# Patient Record
Sex: MALE | Race: WHITE | Employment: OTHER | URBAN - NONMETROPOLITAN AREA
[De-identification: names, ages, dates, MRNs, and addresses within clinical notes are randomized per-mention and may not be internally consistent; named-entity substitution may affect disease eponyms.]

---

## 2017-01-17 ENCOUNTER — TELEPHONE (OUTPATIENT)
Dept: FAMILY MEDICINE CLINIC | Age: 57
End: 2017-01-17

## 2017-01-17 DIAGNOSIS — I10 ESSENTIAL HYPERTENSION: Primary | Chronic | ICD-10-CM

## 2017-01-20 ENCOUNTER — TELEPHONE (OUTPATIENT)
Dept: FAMILY MEDICINE CLINIC | Age: 57
End: 2017-01-20

## 2017-01-20 ENCOUNTER — OFFICE VISIT (OUTPATIENT)
Dept: FAMILY MEDICINE CLINIC | Age: 57
End: 2017-01-20

## 2017-01-20 VITALS
HEART RATE: 95 BPM | HEIGHT: 66 IN | TEMPERATURE: 98 F | BODY MASS INDEX: 25.97 KG/M2 | WEIGHT: 161.6 LBS | SYSTOLIC BLOOD PRESSURE: 132 MMHG | DIASTOLIC BLOOD PRESSURE: 80 MMHG | RESPIRATION RATE: 14 BRPM

## 2017-01-20 DIAGNOSIS — R29.898 HAND WEAKNESS: Primary | ICD-10-CM

## 2017-01-20 DIAGNOSIS — B18.2 CHRONIC HEPATITIS C WITHOUT HEPATIC COMA (HCC): Chronic | ICD-10-CM

## 2017-01-20 DIAGNOSIS — M25.561 CHRONIC PAIN OF RIGHT KNEE: ICD-10-CM

## 2017-01-20 DIAGNOSIS — R10.33 UMBILICAL PAIN: ICD-10-CM

## 2017-01-20 DIAGNOSIS — G89.29 CHRONIC PAIN OF RIGHT KNEE: ICD-10-CM

## 2017-01-20 DIAGNOSIS — F17.210 CIGARETTE NICOTINE DEPENDENCE WITHOUT COMPLICATION: Chronic | ICD-10-CM

## 2017-01-20 PROCEDURE — G8427 DOCREV CUR MEDS BY ELIG CLIN: HCPCS | Performed by: FAMILY MEDICINE

## 2017-01-20 PROCEDURE — 3017F COLORECTAL CA SCREEN DOC REV: CPT | Performed by: FAMILY MEDICINE

## 2017-01-20 PROCEDURE — G8484 FLU IMMUNIZE NO ADMIN: HCPCS | Performed by: FAMILY MEDICINE

## 2017-01-20 PROCEDURE — G8419 CALC BMI OUT NRM PARAM NOF/U: HCPCS | Performed by: FAMILY MEDICINE

## 2017-01-20 PROCEDURE — 99214 OFFICE O/P EST MOD 30 MIN: CPT | Performed by: FAMILY MEDICINE

## 2017-01-20 PROCEDURE — 4004F PT TOBACCO SCREEN RCVD TLK: CPT | Performed by: FAMILY MEDICINE

## 2017-01-23 ENCOUNTER — TELEPHONE (OUTPATIENT)
Dept: FAMILY MEDICINE CLINIC | Age: 57
End: 2017-01-23

## 2017-01-23 DIAGNOSIS — M17.11 OSTEOARTHRITIS OF RIGHT KNEE, UNSPECIFIED OSTEOARTHRITIS TYPE: Primary | ICD-10-CM

## 2017-01-23 DIAGNOSIS — M25.561 CHRONIC PAIN OF RIGHT KNEE: ICD-10-CM

## 2017-01-23 DIAGNOSIS — G89.29 CHRONIC PAIN OF RIGHT KNEE: ICD-10-CM

## 2017-01-23 DIAGNOSIS — M51.35 DDD (DEGENERATIVE DISC DISEASE), THORACOLUMBAR: Chronic | ICD-10-CM

## 2017-01-23 DIAGNOSIS — M96.1 FAILED BACK SYNDROME: Chronic | ICD-10-CM

## 2017-01-23 RX ORDER — OXYCODONE HYDROCHLORIDE 20 MG/1
20 TABLET ORAL EVERY 4 HOURS PRN
Qty: 180 TABLET | Refills: 0 | Status: SHIPPED | OUTPATIENT
Start: 2017-01-23 | End: 2017-02-21 | Stop reason: SDUPTHER

## 2017-01-24 ENCOUNTER — TELEPHONE (OUTPATIENT)
Dept: FAMILY MEDICINE CLINIC | Age: 57
End: 2017-01-24

## 2017-01-27 DIAGNOSIS — K21.9 GASTROESOPHAGEAL REFLUX DISEASE, ESOPHAGITIS PRESENCE NOT SPECIFIED: Chronic | ICD-10-CM

## 2017-01-27 RX ORDER — OMEPRAZOLE 20 MG/1
CAPSULE, DELAYED RELEASE ORAL
Qty: 90 CAPSULE | Refills: 3 | Status: SHIPPED | OUTPATIENT
Start: 2017-01-27 | End: 2018-04-04 | Stop reason: SDUPTHER

## 2017-01-30 ENCOUNTER — TELEPHONE (OUTPATIENT)
Dept: FAMILY MEDICINE CLINIC | Age: 57
End: 2017-01-30

## 2017-02-01 RX ORDER — ALBUTEROL SULFATE 2.5 MG/3ML
SOLUTION RESPIRATORY (INHALATION)
Qty: 360 ML | Refills: 3 | Status: SHIPPED | OUTPATIENT
Start: 2017-02-01 | End: 2017-08-16 | Stop reason: SDUPTHER

## 2017-02-06 ENCOUNTER — OFFICE VISIT (OUTPATIENT)
Dept: FAMILY MEDICINE CLINIC | Age: 57
End: 2017-02-06

## 2017-02-06 VITALS
HEART RATE: 65 BPM | TEMPERATURE: 98.8 F | SYSTOLIC BLOOD PRESSURE: 128 MMHG | WEIGHT: 157 LBS | RESPIRATION RATE: 16 BRPM | HEIGHT: 66 IN | BODY MASS INDEX: 25.23 KG/M2 | DIASTOLIC BLOOD PRESSURE: 82 MMHG

## 2017-02-06 DIAGNOSIS — F17.210 CIGARETTE NICOTINE DEPENDENCE WITHOUT COMPLICATION: ICD-10-CM

## 2017-02-06 DIAGNOSIS — R10.33 UMBILICAL PAIN: ICD-10-CM

## 2017-02-06 DIAGNOSIS — A08.4 VIRAL GASTROENTERITIS: Primary | ICD-10-CM

## 2017-02-06 DIAGNOSIS — R77.8 ELEVATED TROPONIN LEVEL: ICD-10-CM

## 2017-02-06 DIAGNOSIS — B18.2 CHRONIC HEPATITIS C WITHOUT HEPATIC COMA (HCC): ICD-10-CM

## 2017-02-06 PROCEDURE — 99495 TRANSJ CARE MGMT MOD F2F 14D: CPT | Performed by: FAMILY MEDICINE

## 2017-02-06 RX ORDER — FUROSEMIDE 20 MG/1
20 TABLET ORAL DAILY PRN
COMMUNITY
End: 2017-07-25 | Stop reason: SDUPTHER

## 2017-02-06 RX ORDER — NICOTINE 21 MG/24HR
1 PATCH, TRANSDERMAL 24 HOURS TRANSDERMAL EVERY 24 HOURS
Qty: 42 PATCH | Refills: 0 | Status: SHIPPED | OUTPATIENT
Start: 2017-02-06 | End: 2017-03-20

## 2017-02-07 DIAGNOSIS — G89.4 CHRONIC PAIN SYNDROME: Chronic | ICD-10-CM

## 2017-02-07 DIAGNOSIS — M51.35 DDD (DEGENERATIVE DISC DISEASE), THORACOLUMBAR: Chronic | ICD-10-CM

## 2017-02-07 RX ORDER — PSEUDOEPHEDRINE HCL 30 MG
100 TABLET ORAL 2 TIMES DAILY PRN
Qty: 180 CAPSULE | Refills: 3 | Status: SHIPPED | OUTPATIENT
Start: 2017-02-07 | End: 2017-02-13 | Stop reason: ALTCHOICE

## 2017-02-08 ENCOUNTER — TELEPHONE (OUTPATIENT)
Dept: FAMILY MEDICINE CLINIC | Age: 57
End: 2017-02-08

## 2017-02-13 ENCOUNTER — TELEPHONE (OUTPATIENT)
Dept: FAMILY MEDICINE CLINIC | Age: 57
End: 2017-02-13

## 2017-02-13 DIAGNOSIS — K59.03 DRUG-INDUCED CONSTIPATION: Primary | ICD-10-CM

## 2017-02-13 RX ORDER — POLYETHYLENE GLYCOL 3350 17 G/17G
17 POWDER, FOR SOLUTION ORAL DAILY PRN
Qty: 510 G | Refills: 5 | Status: SHIPPED | OUTPATIENT
Start: 2017-02-13 | End: 2017-12-14 | Stop reason: SDUPTHER

## 2017-02-21 ENCOUNTER — OFFICE VISIT (OUTPATIENT)
Dept: ONCOLOGY | Age: 57
End: 2017-02-21

## 2017-02-21 VITALS
HEART RATE: 86 BPM | OXYGEN SATURATION: 96 % | SYSTOLIC BLOOD PRESSURE: 121 MMHG | WEIGHT: 151.2 LBS | DIASTOLIC BLOOD PRESSURE: 77 MMHG | BODY MASS INDEX: 24.3 KG/M2 | RESPIRATION RATE: 18 BRPM | TEMPERATURE: 97.4 F | HEIGHT: 66 IN

## 2017-02-21 DIAGNOSIS — M96.1 FAILED BACK SYNDROME: Chronic | ICD-10-CM

## 2017-02-21 DIAGNOSIS — C83.33 DIFFUSE LARGE B-CELL LYMPHOMA OF INTRA-ABDOMINAL LYMPH NODES (HCC): Primary | ICD-10-CM

## 2017-02-21 DIAGNOSIS — M51.35 DDD (DEGENERATIVE DISC DISEASE), THORACOLUMBAR: Chronic | ICD-10-CM

## 2017-02-21 PROCEDURE — G8484 FLU IMMUNIZE NO ADMIN: HCPCS | Performed by: INTERNAL MEDICINE

## 2017-02-21 PROCEDURE — 4004F PT TOBACCO SCREEN RCVD TLK: CPT | Performed by: INTERNAL MEDICINE

## 2017-02-21 PROCEDURE — 3017F COLORECTAL CA SCREEN DOC REV: CPT | Performed by: INTERNAL MEDICINE

## 2017-02-21 PROCEDURE — G8427 DOCREV CUR MEDS BY ELIG CLIN: HCPCS | Performed by: INTERNAL MEDICINE

## 2017-02-21 PROCEDURE — 99213 OFFICE O/P EST LOW 20 MIN: CPT | Performed by: INTERNAL MEDICINE

## 2017-02-21 PROCEDURE — G8420 CALC BMI NORM PARAMETERS: HCPCS | Performed by: INTERNAL MEDICINE

## 2017-02-21 RX ORDER — OXYCODONE HYDROCHLORIDE 20 MG/1
20 TABLET ORAL EVERY 4 HOURS PRN
Qty: 180 TABLET | Refills: 0 | Status: SHIPPED | OUTPATIENT
Start: 2017-02-21 | End: 2017-03-20 | Stop reason: SDUPTHER

## 2017-02-22 ENCOUNTER — TELEPHONE (OUTPATIENT)
Dept: FAMILY MEDICINE CLINIC | Age: 57
End: 2017-02-22

## 2017-02-23 ENCOUNTER — TELEPHONE (OUTPATIENT)
Dept: FAMILY MEDICINE CLINIC | Age: 57
End: 2017-02-23

## 2017-02-23 DIAGNOSIS — J30.89 PERENNIAL ALLERGIC RHINITIS, UNSPECIFIED ALLERGIC RHINITIS TRIGGER: Primary | ICD-10-CM

## 2017-02-23 RX ORDER — LORATADINE 10 MG/1
10 TABLET ORAL DAILY
Qty: 90 TABLET | Refills: 3 | Status: SHIPPED | OUTPATIENT
Start: 2017-02-23 | End: 2017-04-20

## 2017-02-27 ENCOUNTER — TELEPHONE (OUTPATIENT)
Dept: FAMILY MEDICINE CLINIC | Age: 57
End: 2017-02-27

## 2017-02-27 DIAGNOSIS — M25.561 RIGHT KNEE PAIN, UNSPECIFIED CHRONICITY: Primary | ICD-10-CM

## 2017-03-01 ENCOUNTER — TELEPHONE (OUTPATIENT)
Dept: FAMILY MEDICINE CLINIC | Age: 57
End: 2017-03-01

## 2017-03-06 ENCOUNTER — TELEPHONE (OUTPATIENT)
Dept: FAMILY MEDICINE CLINIC | Age: 57
End: 2017-03-06

## 2017-03-07 ENCOUNTER — OFFICE VISIT (OUTPATIENT)
Dept: FAMILY MEDICINE CLINIC | Age: 57
End: 2017-03-07

## 2017-03-07 VITALS
WEIGHT: 152.4 LBS | HEIGHT: 64 IN | SYSTOLIC BLOOD PRESSURE: 114 MMHG | HEART RATE: 80 BPM | TEMPERATURE: 98.3 F | BODY MASS INDEX: 26.02 KG/M2 | DIASTOLIC BLOOD PRESSURE: 80 MMHG | RESPIRATION RATE: 12 BRPM

## 2017-03-07 DIAGNOSIS — M25.561 CHRONIC PAIN OF RIGHT KNEE: ICD-10-CM

## 2017-03-07 DIAGNOSIS — R10.33 UMBILICAL PAIN: ICD-10-CM

## 2017-03-07 DIAGNOSIS — M17.11 OSTEOARTHRITIS OF RIGHT KNEE, UNSPECIFIED OSTEOARTHRITIS TYPE: Primary | ICD-10-CM

## 2017-03-07 DIAGNOSIS — F17.210 CIGARETTE NICOTINE DEPENDENCE WITHOUT COMPLICATION: Chronic | ICD-10-CM

## 2017-03-07 DIAGNOSIS — G89.29 CHRONIC PAIN OF RIGHT KNEE: ICD-10-CM

## 2017-03-07 DIAGNOSIS — S89.81XS: ICD-10-CM

## 2017-03-07 PROCEDURE — G8427 DOCREV CUR MEDS BY ELIG CLIN: HCPCS | Performed by: FAMILY MEDICINE

## 2017-03-07 PROCEDURE — G8484 FLU IMMUNIZE NO ADMIN: HCPCS | Performed by: FAMILY MEDICINE

## 2017-03-07 PROCEDURE — 99214 OFFICE O/P EST MOD 30 MIN: CPT | Performed by: FAMILY MEDICINE

## 2017-03-07 PROCEDURE — 4004F PT TOBACCO SCREEN RCVD TLK: CPT | Performed by: FAMILY MEDICINE

## 2017-03-07 PROCEDURE — 3017F COLORECTAL CA SCREEN DOC REV: CPT | Performed by: FAMILY MEDICINE

## 2017-03-07 PROCEDURE — G8419 CALC BMI OUT NRM PARAM NOF/U: HCPCS | Performed by: FAMILY MEDICINE

## 2017-03-08 ENCOUNTER — TELEPHONE (OUTPATIENT)
Dept: FAMILY MEDICINE CLINIC | Age: 57
End: 2017-03-08

## 2017-03-13 RX ORDER — BUSPIRONE HYDROCHLORIDE 15 MG/1
TABLET ORAL
Qty: 360 TABLET | Refills: 3 | Status: SHIPPED | OUTPATIENT
Start: 2017-03-13 | End: 2018-04-18 | Stop reason: SDUPTHER

## 2017-03-20 ENCOUNTER — OFFICE VISIT (OUTPATIENT)
Dept: FAMILY MEDICINE CLINIC | Age: 57
End: 2017-03-20

## 2017-03-20 ENCOUNTER — TELEPHONE (OUTPATIENT)
Dept: FAMILY MEDICINE CLINIC | Age: 57
End: 2017-03-20

## 2017-03-20 VITALS
TEMPERATURE: 98.6 F | DIASTOLIC BLOOD PRESSURE: 74 MMHG | WEIGHT: 157.4 LBS | HEART RATE: 80 BPM | BODY MASS INDEX: 26.87 KG/M2 | RESPIRATION RATE: 14 BRPM | SYSTOLIC BLOOD PRESSURE: 138 MMHG | HEIGHT: 64 IN

## 2017-03-20 DIAGNOSIS — B18.2 CHRONIC HEPATITIS C WITHOUT HEPATIC COMA (HCC): ICD-10-CM

## 2017-03-20 DIAGNOSIS — R77.8 ELEVATED TROPONIN LEVEL: ICD-10-CM

## 2017-03-20 DIAGNOSIS — M51.35 DDD (DEGENERATIVE DISC DISEASE), THORACOLUMBAR: Primary | Chronic | ICD-10-CM

## 2017-03-20 DIAGNOSIS — M25.522 LEFT ELBOW PAIN: ICD-10-CM

## 2017-03-20 DIAGNOSIS — I71.20 THORACIC AORTIC ANEURYSM WITHOUT RUPTURE: Chronic | ICD-10-CM

## 2017-03-20 DIAGNOSIS — M81.0 OSTEOPOROSIS: ICD-10-CM

## 2017-03-20 DIAGNOSIS — F17.210 CIGARETTE NICOTINE DEPENDENCE WITHOUT COMPLICATION: Chronic | ICD-10-CM

## 2017-03-20 DIAGNOSIS — M96.1 FAILED BACK SYNDROME: Chronic | ICD-10-CM

## 2017-03-20 DIAGNOSIS — R29.898 HAND WEAKNESS: ICD-10-CM

## 2017-03-20 PROCEDURE — 3017F COLORECTAL CA SCREEN DOC REV: CPT | Performed by: FAMILY MEDICINE

## 2017-03-20 PROCEDURE — 99214 OFFICE O/P EST MOD 30 MIN: CPT | Performed by: FAMILY MEDICINE

## 2017-03-20 PROCEDURE — G8427 DOCREV CUR MEDS BY ELIG CLIN: HCPCS | Performed by: FAMILY MEDICINE

## 2017-03-20 PROCEDURE — 4005F PHARM THX FOR OP RXD: CPT | Performed by: FAMILY MEDICINE

## 2017-03-20 PROCEDURE — G8484 FLU IMMUNIZE NO ADMIN: HCPCS | Performed by: FAMILY MEDICINE

## 2017-03-20 PROCEDURE — 4004F PT TOBACCO SCREEN RCVD TLK: CPT | Performed by: FAMILY MEDICINE

## 2017-03-20 PROCEDURE — G8419 CALC BMI OUT NRM PARAM NOF/U: HCPCS | Performed by: FAMILY MEDICINE

## 2017-03-20 RX ORDER — OXYCODONE HYDROCHLORIDE 20 MG/1
20 TABLET ORAL EVERY 4 HOURS PRN
Qty: 180 TABLET | Refills: 0 | Status: SHIPPED | OUTPATIENT
Start: 2017-03-20 | End: 2017-04-17 | Stop reason: SDUPTHER

## 2017-03-20 RX ORDER — ALBUTEROL SULFATE 90 UG/1
AEROSOL, METERED RESPIRATORY (INHALATION)
Qty: 3 INHALER | Refills: 5 | Status: SHIPPED | OUTPATIENT
Start: 2017-03-20 | End: 2018-05-06 | Stop reason: SDUPTHER

## 2017-04-03 ENCOUNTER — TELEPHONE (OUTPATIENT)
Dept: FAMILY MEDICINE CLINIC | Age: 57
End: 2017-04-03

## 2017-04-03 DIAGNOSIS — M85.80 OSTEOPENIA: Primary | Chronic | ICD-10-CM

## 2017-04-10 DIAGNOSIS — A08.4 VIRAL GASTROENTERITIS: ICD-10-CM

## 2017-04-10 DIAGNOSIS — R11.0 NAUSEA: ICD-10-CM

## 2017-04-10 RX ORDER — ONDANSETRON 4 MG/1
4 TABLET, FILM COATED ORAL DAILY PRN
Qty: 60 TABLET | Refills: 1 | Status: SHIPPED | OUTPATIENT
Start: 2017-04-10 | End: 2017-06-20 | Stop reason: SDUPTHER

## 2017-04-17 ENCOUNTER — TELEPHONE (OUTPATIENT)
Dept: FAMILY MEDICINE CLINIC | Age: 57
End: 2017-04-17

## 2017-04-17 DIAGNOSIS — M51.35 DDD (DEGENERATIVE DISC DISEASE), THORACOLUMBAR: Primary | Chronic | ICD-10-CM

## 2017-04-17 DIAGNOSIS — M96.1 FAILED BACK SYNDROME: Chronic | ICD-10-CM

## 2017-04-17 RX ORDER — OXYCODONE HYDROCHLORIDE 20 MG/1
20 TABLET ORAL EVERY 4 HOURS PRN
Qty: 180 TABLET | Refills: 0 | Status: SHIPPED | OUTPATIENT
Start: 2017-04-17 | End: 2017-05-08 | Stop reason: DRUGHIGH

## 2017-04-20 ENCOUNTER — OFFICE VISIT (OUTPATIENT)
Dept: FAMILY MEDICINE CLINIC | Age: 57
End: 2017-04-20

## 2017-04-20 VITALS
DIASTOLIC BLOOD PRESSURE: 76 MMHG | RESPIRATION RATE: 16 BRPM | HEART RATE: 84 BPM | WEIGHT: 151 LBS | SYSTOLIC BLOOD PRESSURE: 118 MMHG | TEMPERATURE: 98.4 F | BODY MASS INDEX: 25.92 KG/M2

## 2017-04-20 DIAGNOSIS — S46.212A RUPTURE OF DISTAL BICEPS TENDON, LEFT, INITIAL ENCOUNTER: Primary | ICD-10-CM

## 2017-04-20 DIAGNOSIS — M85.80 OSTEOPENIA: Chronic | ICD-10-CM

## 2017-04-20 DIAGNOSIS — F17.210 CIGARETTE NICOTINE DEPENDENCE WITHOUT COMPLICATION: Chronic | ICD-10-CM

## 2017-04-20 DIAGNOSIS — I10 ESSENTIAL HYPERTENSION: Chronic | ICD-10-CM

## 2017-04-20 PROCEDURE — 3017F COLORECTAL CA SCREEN DOC REV: CPT | Performed by: FAMILY MEDICINE

## 2017-04-20 PROCEDURE — G8427 DOCREV CUR MEDS BY ELIG CLIN: HCPCS | Performed by: FAMILY MEDICINE

## 2017-04-20 PROCEDURE — 4004F PT TOBACCO SCREEN RCVD TLK: CPT | Performed by: FAMILY MEDICINE

## 2017-04-20 PROCEDURE — G8419 CALC BMI OUT NRM PARAM NOF/U: HCPCS | Performed by: FAMILY MEDICINE

## 2017-04-20 PROCEDURE — 99214 OFFICE O/P EST MOD 30 MIN: CPT | Performed by: FAMILY MEDICINE

## 2017-04-20 RX ORDER — NAPROXEN 500 MG/1
500 TABLET ORAL 2 TIMES DAILY
Refills: 3 | COMMUNITY
Start: 2017-04-06 | End: 2017-07-10 | Stop reason: SDUPTHER

## 2017-04-20 RX ORDER — POLYETHYLENE GLYCOL 3350 17 G/17G
POWDER, FOR SOLUTION ORAL DAILY
Refills: 4 | COMMUNITY
Start: 2017-03-17 | End: 2017-12-14 | Stop reason: SDUPTHER

## 2017-04-24 RX ORDER — BUTALBITAL, ACETAMINOPHEN AND CAFFEINE 50; 325; 40 MG/1; MG/1; MG/1
TABLET ORAL
Qty: 60 TABLET | Refills: 1 | Status: SHIPPED | OUTPATIENT
Start: 2017-04-24 | End: 2017-12-14 | Stop reason: SDUPTHER

## 2017-05-01 ENCOUNTER — OFFICE VISIT (OUTPATIENT)
Dept: FAMILY MEDICINE CLINIC | Age: 57
End: 2017-05-01

## 2017-05-01 VITALS
WEIGHT: 149 LBS | RESPIRATION RATE: 20 BRPM | TEMPERATURE: 98.3 F | BODY MASS INDEX: 25.58 KG/M2 | HEART RATE: 76 BPM | SYSTOLIC BLOOD PRESSURE: 124 MMHG | DIASTOLIC BLOOD PRESSURE: 78 MMHG

## 2017-05-01 DIAGNOSIS — J01.90 ACUTE RHINOSINUSITIS: Primary | ICD-10-CM

## 2017-05-01 DIAGNOSIS — F17.210 CIGARETTE NICOTINE DEPENDENCE WITHOUT COMPLICATION: Chronic | ICD-10-CM

## 2017-05-01 DIAGNOSIS — S46.212D RUPTURE OF DISTAL BICEPS TENDON, LEFT, SUBSEQUENT ENCOUNTER: ICD-10-CM

## 2017-05-01 PROCEDURE — 99214 OFFICE O/P EST MOD 30 MIN: CPT | Performed by: FAMILY MEDICINE

## 2017-05-01 PROCEDURE — 4004F PT TOBACCO SCREEN RCVD TLK: CPT | Performed by: FAMILY MEDICINE

## 2017-05-01 PROCEDURE — G8427 DOCREV CUR MEDS BY ELIG CLIN: HCPCS | Performed by: FAMILY MEDICINE

## 2017-05-01 PROCEDURE — G8419 CALC BMI OUT NRM PARAM NOF/U: HCPCS | Performed by: FAMILY MEDICINE

## 2017-05-01 PROCEDURE — 3017F COLORECTAL CA SCREEN DOC REV: CPT | Performed by: FAMILY MEDICINE

## 2017-05-01 RX ORDER — DOXYCYCLINE HYCLATE 100 MG/1
100 CAPSULE ORAL 2 TIMES DAILY
Qty: 20 CAPSULE | Refills: 0 | Status: SHIPPED | OUTPATIENT
Start: 2017-05-01 | End: 2017-05-11

## 2017-05-02 ENCOUNTER — TELEPHONE (OUTPATIENT)
Dept: FAMILY MEDICINE CLINIC | Age: 57
End: 2017-05-02

## 2017-05-02 DIAGNOSIS — R77.8 ELEVATED TROPONIN: ICD-10-CM

## 2017-05-02 DIAGNOSIS — Z01.818 PREOP EXAMINATION: Primary | ICD-10-CM

## 2017-05-02 DIAGNOSIS — M96.1 FAILED BACK SYNDROME: Chronic | ICD-10-CM

## 2017-05-02 DIAGNOSIS — Z87.39 HISTORY OF SPINAL STENOSIS: Chronic | ICD-10-CM

## 2017-05-02 DIAGNOSIS — M19.90 ARTHRITIS: Chronic | ICD-10-CM

## 2017-05-02 RX ORDER — NAPROXEN 500 MG/1
TABLET ORAL
Qty: 60 TABLET | Refills: 0 | OUTPATIENT
Start: 2017-05-02

## 2017-05-04 ENCOUNTER — OFFICE VISIT (OUTPATIENT)
Dept: CARDIOLOGY | Age: 57
End: 2017-05-04

## 2017-05-04 VITALS
HEART RATE: 72 BPM | DIASTOLIC BLOOD PRESSURE: 86 MMHG | BODY MASS INDEX: 25.85 KG/M2 | WEIGHT: 151.4 LBS | SYSTOLIC BLOOD PRESSURE: 134 MMHG | HEIGHT: 64 IN

## 2017-05-04 DIAGNOSIS — E78.5 DYSLIPIDEMIA, GOAL LDL BELOW 100: ICD-10-CM

## 2017-05-04 DIAGNOSIS — I10 HYPERTENSION GOAL BP (BLOOD PRESSURE) < 130/80: ICD-10-CM

## 2017-05-04 DIAGNOSIS — F17.200 CURRENT SMOKER: ICD-10-CM

## 2017-05-04 DIAGNOSIS — Z01.818 PRE-OPERATIVE CLEARANCE: Primary | ICD-10-CM

## 2017-05-04 DIAGNOSIS — S46.212A BICEPS MUSCLE TEAR, LEFT, INITIAL ENCOUNTER: ICD-10-CM

## 2017-05-04 DIAGNOSIS — R94.31 ABNORMAL EKG: ICD-10-CM

## 2017-05-04 PROCEDURE — 99406 BEHAV CHNG SMOKING 3-10 MIN: CPT | Performed by: INTERNAL MEDICINE

## 2017-05-04 PROCEDURE — G8419 CALC BMI OUT NRM PARAM NOF/U: HCPCS | Performed by: INTERNAL MEDICINE

## 2017-05-04 PROCEDURE — 99214 OFFICE O/P EST MOD 30 MIN: CPT | Performed by: INTERNAL MEDICINE

## 2017-05-04 PROCEDURE — 4004F PT TOBACCO SCREEN RCVD TLK: CPT | Performed by: INTERNAL MEDICINE

## 2017-05-04 PROCEDURE — G8427 DOCREV CUR MEDS BY ELIG CLIN: HCPCS | Performed by: INTERNAL MEDICINE

## 2017-05-04 PROCEDURE — 3017F COLORECTAL CA SCREEN DOC REV: CPT | Performed by: INTERNAL MEDICINE

## 2017-05-04 RX ORDER — ATORVASTATIN CALCIUM 10 MG/1
TABLET, FILM COATED ORAL
Qty: 90 TABLET | Refills: 3 | Status: SHIPPED | OUTPATIENT
Start: 2017-05-04

## 2017-05-04 RX ORDER — ATORVASTATIN CALCIUM 10 MG/1
10 TABLET, FILM COATED ORAL DAILY
Qty: 30 TABLET | Refills: 3 | Status: SHIPPED | OUTPATIENT
Start: 2017-05-04 | End: 2017-05-04 | Stop reason: SDUPTHER

## 2017-05-08 DIAGNOSIS — S46.212D RUPTURE OF DISTAL BICEPS TENDON, LEFT, SUBSEQUENT ENCOUNTER: ICD-10-CM

## 2017-05-08 DIAGNOSIS — M96.1 FAILED BACK SYNDROME: Chronic | ICD-10-CM

## 2017-05-08 DIAGNOSIS — M51.35 DDD (DEGENERATIVE DISC DISEASE), THORACOLUMBAR: Primary | Chronic | ICD-10-CM

## 2017-05-08 PROBLEM — S46.219A RUPTURE OF DISTAL BICEPS TENDON: Status: ACTIVE | Noted: 2017-05-08

## 2017-05-08 RX ORDER — OXYCODONE HYDROCHLORIDE 30 MG/1
30 TABLET ORAL EVERY 4 HOURS PRN
Qty: 180 TABLET | Refills: 0 | Status: SHIPPED | OUTPATIENT
Start: 2017-05-08 | End: 2017-06-02 | Stop reason: SDUPTHER

## 2017-05-08 RX ORDER — OXYCODONE HYDROCHLORIDE 20 MG/1
20 TABLET ORAL EVERY 4 HOURS PRN
Qty: 180 TABLET | Refills: 0 | Status: CANCELLED | OUTPATIENT
Start: 2017-05-08

## 2017-05-09 ENCOUNTER — TELEPHONE (OUTPATIENT)
Dept: CARDIOLOGY | Age: 57
End: 2017-05-09

## 2017-05-15 ENCOUNTER — OFFICE VISIT (OUTPATIENT)
Dept: FAMILY MEDICINE CLINIC | Age: 57
End: 2017-05-15

## 2017-05-15 VITALS
SYSTOLIC BLOOD PRESSURE: 120 MMHG | RESPIRATION RATE: 18 BRPM | WEIGHT: 147 LBS | DIASTOLIC BLOOD PRESSURE: 62 MMHG | TEMPERATURE: 98.6 F | BODY MASS INDEX: 24.49 KG/M2 | HEART RATE: 84 BPM | HEIGHT: 65 IN

## 2017-05-15 DIAGNOSIS — J01.90 ACUTE RHINOSINUSITIS: ICD-10-CM

## 2017-05-15 DIAGNOSIS — N41.0 ACUTE PROSTATITIS: Primary | ICD-10-CM

## 2017-05-15 DIAGNOSIS — F17.210 CIGARETTE NICOTINE DEPENDENCE WITHOUT COMPLICATION: Chronic | ICD-10-CM

## 2017-05-15 LAB
BACTERIA: ABNORMAL
BILIRUBIN URINE: NEGATIVE
BILIRUBIN, POC: NORMAL
BLOOD URINE, POC: NORMAL
BLOOD, URINE: ABNORMAL
CASTS: ABNORMAL /LPF
CHARACTER, URINE: CLEAR
CLARITY, POC: CLEAR
COLOR, POC: YELLOW
COLOR: YELLOW
EPITHELIAL CELLS, UA: ABNORMAL /HPF
GLUCOSE URINE, POC: NORMAL
GLUCOSE, URINE: NEGATIVE MG/DL
KETONES, POC: NORMAL
KETONES, URINE: NEGATIVE
LEUKOCYTE EST, POC: NORMAL
LEUKOCYTE ESTERASE, URINE: NEGATIVE
NITRITE, POC: NORMAL
NITRITE, URINE: NEGATIVE
PH UA: 6.5
PH, POC: 6.5
PROTEIN UA: NEGATIVE MG/DL
PROTEIN, POC: NORMAL
RBC URINE: ABNORMAL /HPF
SPECIFIC GRAVITY UA: 1.01 (ref 1–1.03)
SPECIFIC GRAVITY, POC: 1.02
UROBILINOGEN, POC: >8
UROBILINOGEN, URINE: 4 EU/DL
WBC UA: ABNORMAL /HPF

## 2017-05-15 PROCEDURE — 99214 OFFICE O/P EST MOD 30 MIN: CPT | Performed by: FAMILY MEDICINE

## 2017-05-15 PROCEDURE — 81001 URINALYSIS AUTO W/SCOPE: CPT | Performed by: FAMILY MEDICINE

## 2017-05-15 PROCEDURE — G8427 DOCREV CUR MEDS BY ELIG CLIN: HCPCS | Performed by: FAMILY MEDICINE

## 2017-05-15 PROCEDURE — 4004F PT TOBACCO SCREEN RCVD TLK: CPT | Performed by: FAMILY MEDICINE

## 2017-05-15 PROCEDURE — 81002 URINALYSIS NONAUTO W/O SCOPE: CPT | Performed by: FAMILY MEDICINE

## 2017-05-15 PROCEDURE — G8420 CALC BMI NORM PARAMETERS: HCPCS | Performed by: FAMILY MEDICINE

## 2017-05-15 PROCEDURE — 3017F COLORECTAL CA SCREEN DOC REV: CPT | Performed by: FAMILY MEDICINE

## 2017-05-15 RX ORDER — LEVOFLOXACIN 500 MG/1
500 TABLET, FILM COATED ORAL DAILY
Qty: 14 TABLET | Refills: 0 | Status: SHIPPED | OUTPATIENT
Start: 2017-05-15 | End: 2017-05-29

## 2017-05-17 LAB — URINE CULTURE, ROUTINE: NORMAL

## 2017-05-18 ENCOUNTER — TELEPHONE (OUTPATIENT)
Dept: FAMILY MEDICINE CLINIC | Age: 57
End: 2017-05-18

## 2017-05-20 DIAGNOSIS — M96.1 FAILED BACK SYNDROME: Chronic | ICD-10-CM

## 2017-05-20 DIAGNOSIS — M51.35 DDD (DEGENERATIVE DISC DISEASE), THORACOLUMBAR: Chronic | ICD-10-CM

## 2017-05-22 RX ORDER — TIZANIDINE 4 MG/1
TABLET ORAL
Qty: 60 TABLET | Refills: 3 | Status: SHIPPED | OUTPATIENT
Start: 2017-05-22 | End: 2017-10-03

## 2017-05-30 ENCOUNTER — TELEPHONE (OUTPATIENT)
Dept: FAMILY MEDICINE CLINIC | Age: 57
End: 2017-05-30

## 2017-05-31 ENCOUNTER — TELEPHONE (OUTPATIENT)
Dept: FAMILY MEDICINE CLINIC | Age: 57
End: 2017-05-31

## 2017-05-31 ENCOUNTER — OFFICE VISIT (OUTPATIENT)
Dept: FAMILY MEDICINE CLINIC | Age: 57
End: 2017-05-31

## 2017-05-31 VITALS
SYSTOLIC BLOOD PRESSURE: 114 MMHG | DIASTOLIC BLOOD PRESSURE: 60 MMHG | HEIGHT: 65 IN | WEIGHT: 148 LBS | HEART RATE: 88 BPM | TEMPERATURE: 98.5 F | RESPIRATION RATE: 12 BRPM | BODY MASS INDEX: 24.66 KG/M2

## 2017-05-31 DIAGNOSIS — J01.90 ACUTE RHINOSINUSITIS: ICD-10-CM

## 2017-05-31 DIAGNOSIS — R73.9 HYPERGLYCEMIA: ICD-10-CM

## 2017-05-31 DIAGNOSIS — R31.29 MICROSCOPIC HEMATURIA: ICD-10-CM

## 2017-05-31 DIAGNOSIS — R20.2 PARESTHESIA OF BOTH FEET: ICD-10-CM

## 2017-05-31 DIAGNOSIS — F17.210 CIGARETTE NICOTINE DEPENDENCE WITHOUT COMPLICATION: Chronic | ICD-10-CM

## 2017-05-31 DIAGNOSIS — N41.0 ACUTE PROSTATITIS: ICD-10-CM

## 2017-05-31 DIAGNOSIS — B07.0 PLANTAR WART: ICD-10-CM

## 2017-05-31 LAB
BACTERIA: ABNORMAL
BILIRUBIN URINE: NEGATIVE
BILIRUBIN, POC: ABNORMAL
BLOOD URINE, POC: ABNORMAL
BLOOD, URINE: ABNORMAL
CASTS: ABNORMAL /LPF
CASTS: ABNORMAL /LPF
CHARACTER, URINE: CLEAR
CLARITY, POC: CLEAR
COLOR, POC: YELLOW
COLOR: ABNORMAL
CRYSTALS: ABNORMAL
EPITHELIAL CELLS, UA: ABNORMAL /HPF
GLUCOSE URINE, POC: NEGATIVE
GLUCOSE, URINE: NEGATIVE MG/DL
KETONES, POC: NEGATIVE
KETONES, URINE: NEGATIVE
LEUKOCYTE EST, POC: NEGATIVE
LEUKOCYTE ESTERASE, URINE: NEGATIVE
MISCELLANEOUS LAB TEST RESULT: ABNORMAL
NITRITE, POC: NEGATIVE
NITRITE, URINE: NEGATIVE
PH UA: 7.5
PH, POC: 7
PROTEIN UA: NEGATIVE MG/DL
PROTEIN, POC: NEGATIVE
RBC URINE: ABNORMAL /HPF
RENAL EPITHELIAL, UA: ABNORMAL
SPECIFIC GRAVITY UA: 1.02 (ref 1–1.03)
SPECIFIC GRAVITY, POC: 1.01
UROBILINOGEN, POC: ABNORMAL
UROBILINOGEN, URINE: 4 EU/DL
WBC UA: ABNORMAL /HPF
YEAST: ABNORMAL

## 2017-05-31 PROCEDURE — 3017F COLORECTAL CA SCREEN DOC REV: CPT | Performed by: FAMILY MEDICINE

## 2017-05-31 PROCEDURE — 81002 URINALYSIS NONAUTO W/O SCOPE: CPT | Performed by: FAMILY MEDICINE

## 2017-05-31 PROCEDURE — 99214 OFFICE O/P EST MOD 30 MIN: CPT | Performed by: FAMILY MEDICINE

## 2017-05-31 PROCEDURE — G8427 DOCREV CUR MEDS BY ELIG CLIN: HCPCS | Performed by: FAMILY MEDICINE

## 2017-05-31 PROCEDURE — G8420 CALC BMI NORM PARAMETERS: HCPCS | Performed by: FAMILY MEDICINE

## 2017-05-31 PROCEDURE — 4004F PT TOBACCO SCREEN RCVD TLK: CPT | Performed by: FAMILY MEDICINE

## 2017-05-31 RX ORDER — GUAIFENESIN 600 MG/1
600 TABLET, EXTENDED RELEASE ORAL 2 TIMES DAILY PRN
Qty: 60 TABLET | Refills: 1 | Status: SHIPPED | OUTPATIENT
Start: 2017-05-31

## 2017-06-02 ENCOUNTER — TELEPHONE (OUTPATIENT)
Dept: FAMILY MEDICINE CLINIC | Age: 57
End: 2017-06-02

## 2017-06-02 DIAGNOSIS — S46.212D RUPTURE OF DISTAL BICEPS TENDON, LEFT, SUBSEQUENT ENCOUNTER: ICD-10-CM

## 2017-06-02 DIAGNOSIS — M96.1 FAILED BACK SYNDROME: Chronic | ICD-10-CM

## 2017-06-02 DIAGNOSIS — M51.35 DDD (DEGENERATIVE DISC DISEASE), THORACOLUMBAR: Primary | Chronic | ICD-10-CM

## 2017-06-02 LAB
ORGANISM: ABNORMAL
URINE CULTURE, ROUTINE: ABNORMAL

## 2017-06-02 RX ORDER — OXYCODONE HYDROCHLORIDE 30 MG/1
30 TABLET ORAL EVERY 4 HOURS PRN
Qty: 180 TABLET | Refills: 0 | Status: SHIPPED | OUTPATIENT
Start: 2017-06-02 | End: 2017-06-02 | Stop reason: SDUPTHER

## 2017-06-02 RX ORDER — OXYCODONE HYDROCHLORIDE 30 MG/1
30 TABLET ORAL EVERY 4 HOURS PRN
Qty: 180 TABLET | Refills: 0 | Status: SHIPPED | OUTPATIENT
Start: 2017-06-02 | End: 2017-06-28 | Stop reason: SDUPTHER

## 2017-06-06 LAB — HBA1C MFR BLD: 5.7 %

## 2017-06-08 ENCOUNTER — TELEPHONE (OUTPATIENT)
Dept: FAMILY MEDICINE CLINIC | Age: 57
End: 2017-06-08

## 2017-06-12 ENCOUNTER — TELEPHONE (OUTPATIENT)
Dept: FAMILY MEDICINE CLINIC | Age: 57
End: 2017-06-12

## 2017-06-12 DIAGNOSIS — J30.0 VASOMOTOR RHINITIS: ICD-10-CM

## 2017-06-12 DIAGNOSIS — J30.0 VASOMOTOR RHINITIS: Primary | ICD-10-CM

## 2017-06-12 RX ORDER — IPRATROPIUM BROMIDE 21 UG/1
2 SPRAY, METERED NASAL 2 TIMES DAILY
Qty: 1 BOTTLE | Refills: 5 | Status: SHIPPED | OUTPATIENT
Start: 2017-06-12 | End: 2017-06-12 | Stop reason: SDUPTHER

## 2017-06-12 RX ORDER — IPRATROPIUM BROMIDE 21 UG/1
SPRAY, METERED NASAL
Qty: 60 ML | Refills: 5 | Status: SHIPPED | OUTPATIENT
Start: 2017-06-12 | End: 2017-08-03 | Stop reason: ALTCHOICE

## 2017-06-20 ENCOUNTER — OFFICE VISIT (OUTPATIENT)
Dept: FAMILY MEDICINE CLINIC | Age: 57
End: 2017-06-20

## 2017-06-20 ENCOUNTER — TELEPHONE (OUTPATIENT)
Dept: FAMILY MEDICINE CLINIC | Age: 57
End: 2017-06-20

## 2017-06-20 VITALS
HEIGHT: 64 IN | TEMPERATURE: 98.2 F | BODY MASS INDEX: 24.86 KG/M2 | DIASTOLIC BLOOD PRESSURE: 64 MMHG | HEART RATE: 64 BPM | RESPIRATION RATE: 12 BRPM | SYSTOLIC BLOOD PRESSURE: 115 MMHG | WEIGHT: 145.6 LBS

## 2017-06-20 DIAGNOSIS — R20.2 PARESTHESIA OF BOTH FEET: ICD-10-CM

## 2017-06-20 DIAGNOSIS — R31.29 MICROSCOPIC HEMATURIA: Primary | ICD-10-CM

## 2017-06-20 DIAGNOSIS — J32.9 CHRONIC SINUSITIS, UNSPECIFIED LOCATION: ICD-10-CM

## 2017-06-20 DIAGNOSIS — F17.210 CIGARETTE NICOTINE DEPENDENCE WITHOUT COMPLICATION: ICD-10-CM

## 2017-06-20 DIAGNOSIS — R11.0 NAUSEA: ICD-10-CM

## 2017-06-20 DIAGNOSIS — H10.13 ALLERGIC CONJUNCTIVITIS, BILATERAL: ICD-10-CM

## 2017-06-20 DIAGNOSIS — R05.3 CHRONIC COUGH: ICD-10-CM

## 2017-06-20 PROBLEM — H10.10 ALLERGIC CONJUNCTIVITIS: Chronic | Status: ACTIVE | Noted: 2017-06-20

## 2017-06-20 PROBLEM — H10.10 ALLERGIC CONJUNCTIVITIS: Status: ACTIVE | Noted: 2017-06-20

## 2017-06-20 LAB
BACTERIA: ABNORMAL
BILIRUBIN URINE: ABNORMAL
BILIRUBIN, POC: NORMAL
BLOOD URINE, POC: NORMAL
BLOOD, URINE: ABNORMAL
CASTS: ABNORMAL /LPF
CASTS: ABNORMAL /LPF
CHARACTER, URINE: CLEAR
CLARITY, POC: CLEAR
COLOR, POC: NORMAL
COLOR: ABNORMAL
CRYSTALS: ABNORMAL
EPITHELIAL CELLS, UA: ABNORMAL /HPF
GLUCOSE URINE, POC: NORMAL
GLUCOSE, URINE: NEGATIVE MG/DL
ICTOTEST: NEGATIVE
KETONES, POC: NORMAL
KETONES, URINE: NEGATIVE
LEUKOCYTE EST, POC: NORMAL
LEUKOCYTE ESTERASE, URINE: NEGATIVE
MISCELLANEOUS LAB TEST RESULT: ABNORMAL
NITRITE, POC: NORMAL
NITRITE, URINE: NEGATIVE
PH UA: 5.5
PH, POC: 5.5
PROTEIN UA: NEGATIVE MG/DL
PROTEIN, POC: NORMAL
RBC URINE: ABNORMAL /HPF
RENAL EPITHELIAL, UA: ABNORMAL
SPECIFIC GRAVITY UA: 1.02 (ref 1–1.03)
SPECIFIC GRAVITY, POC: 1.02
UROBILINOGEN, POC: 2
UROBILINOGEN, URINE: 1 EU/DL
WBC UA: ABNORMAL /HPF
YEAST: ABNORMAL

## 2017-06-20 PROCEDURE — G8427 DOCREV CUR MEDS BY ELIG CLIN: HCPCS | Performed by: FAMILY MEDICINE

## 2017-06-20 PROCEDURE — 3017F COLORECTAL CA SCREEN DOC REV: CPT | Performed by: FAMILY MEDICINE

## 2017-06-20 PROCEDURE — G8420 CALC BMI NORM PARAMETERS: HCPCS | Performed by: FAMILY MEDICINE

## 2017-06-20 PROCEDURE — 99214 OFFICE O/P EST MOD 30 MIN: CPT | Performed by: FAMILY MEDICINE

## 2017-06-20 PROCEDURE — 4004F PT TOBACCO SCREEN RCVD TLK: CPT | Performed by: FAMILY MEDICINE

## 2017-06-20 PROCEDURE — 81002 URINALYSIS NONAUTO W/O SCOPE: CPT | Performed by: FAMILY MEDICINE

## 2017-06-20 RX ORDER — ONDANSETRON 4 MG/1
4 TABLET, FILM COATED ORAL DAILY PRN
Qty: 60 TABLET | Refills: 1 | Status: SHIPPED | OUTPATIENT
Start: 2017-06-20 | End: 2017-09-21 | Stop reason: SDUPTHER

## 2017-06-20 RX ORDER — OLOPATADINE HYDROCHLORIDE 1 MG/ML
1 SOLUTION/ DROPS OPHTHALMIC 2 TIMES DAILY
Qty: 1 BOTTLE | Refills: 3 | Status: SHIPPED | OUTPATIENT
Start: 2017-06-20 | End: 2017-06-21 | Stop reason: CLARIF

## 2017-06-20 RX ORDER — AMOXICILLIN AND CLAVULANATE POTASSIUM 875; 125 MG/1; MG/1
1 TABLET, FILM COATED ORAL 2 TIMES DAILY
Qty: 60 TABLET | Refills: 0 | Status: SHIPPED | OUTPATIENT
Start: 2017-06-20 | End: 2017-07-20

## 2017-06-20 RX ORDER — UREA 10 %
1 LOTION (ML) TOPICAL DAILY
Qty: 30 TABLET | Refills: 0 | Status: SHIPPED | OUTPATIENT
Start: 2017-06-20 | End: 2017-07-20

## 2017-06-21 ENCOUNTER — TELEPHONE (OUTPATIENT)
Dept: FAMILY MEDICINE CLINIC | Age: 57
End: 2017-06-21

## 2017-06-21 DIAGNOSIS — H10.13 ALLERGIC CONJUNCTIVITIS, BILATERAL: Primary | ICD-10-CM

## 2017-06-21 RX ORDER — OLOPATADINE HYDROCHLORIDE 2 MG/ML
1 SOLUTION/ DROPS OPHTHALMIC DAILY
Qty: 1 BOTTLE | Refills: 5 | Status: SHIPPED | OUTPATIENT
Start: 2017-06-21 | End: 2017-09-23 | Stop reason: SDUPTHER

## 2017-06-22 ENCOUNTER — TELEPHONE (OUTPATIENT)
Dept: FAMILY MEDICINE CLINIC | Age: 57
End: 2017-06-22

## 2017-06-22 LAB — URINE CULTURE, ROUTINE: NORMAL

## 2017-06-23 ENCOUNTER — TELEPHONE (OUTPATIENT)
Dept: FAMILY MEDICINE CLINIC | Age: 57
End: 2017-06-23

## 2017-06-23 DIAGNOSIS — R31.29 MICROSCOPIC HEMATURIA: Primary | ICD-10-CM

## 2017-06-27 DIAGNOSIS — S46.212D RUPTURE OF DISTAL BICEPS TENDON, LEFT, SUBSEQUENT ENCOUNTER: ICD-10-CM

## 2017-06-27 DIAGNOSIS — M96.1 FAILED BACK SYNDROME: Chronic | ICD-10-CM

## 2017-06-27 DIAGNOSIS — M51.35 DDD (DEGENERATIVE DISC DISEASE), THORACOLUMBAR: Primary | Chronic | ICD-10-CM

## 2017-06-27 RX ORDER — OXYCODONE HYDROCHLORIDE 30 MG/1
30 TABLET ORAL EVERY 4 HOURS PRN
Qty: 180 TABLET | Refills: 0 | OUTPATIENT
Start: 2017-06-27

## 2017-06-28 ENCOUNTER — TELEPHONE (OUTPATIENT)
Dept: FAMILY MEDICINE CLINIC | Age: 57
End: 2017-06-28

## 2017-06-28 RX ORDER — OXYCODONE HYDROCHLORIDE 30 MG/1
30 TABLET ORAL EVERY 4 HOURS PRN
Qty: 180 TABLET | Refills: 0 | Status: SHIPPED | OUTPATIENT
Start: 2017-06-28 | End: 2017-07-27 | Stop reason: SDUPTHER

## 2017-07-08 DIAGNOSIS — M19.90 ARTHRITIS: Chronic | ICD-10-CM

## 2017-07-10 RX ORDER — NAPROXEN 500 MG/1
TABLET ORAL
Qty: 60 TABLET | Refills: 2 | Status: SHIPPED | OUTPATIENT
Start: 2017-07-10 | End: 2017-09-27 | Stop reason: SDUPTHER

## 2017-07-18 ENCOUNTER — TELEPHONE (OUTPATIENT)
Dept: FAMILY MEDICINE CLINIC | Age: 57
End: 2017-07-18

## 2017-07-25 ENCOUNTER — OFFICE VISIT (OUTPATIENT)
Dept: FAMILY MEDICINE CLINIC | Age: 57
End: 2017-07-25
Payer: MEDICARE

## 2017-07-25 VITALS
RESPIRATION RATE: 14 BRPM | BODY MASS INDEX: 26.22 KG/M2 | HEART RATE: 84 BPM | WEIGHT: 153.6 LBS | SYSTOLIC BLOOD PRESSURE: 114 MMHG | HEIGHT: 64 IN | TEMPERATURE: 98.1 F | DIASTOLIC BLOOD PRESSURE: 64 MMHG

## 2017-07-25 DIAGNOSIS — R05.3 CHRONIC COUGH: ICD-10-CM

## 2017-07-25 DIAGNOSIS — M51.35 DDD (DEGENERATIVE DISC DISEASE), THORACOLUMBAR: Primary | Chronic | ICD-10-CM

## 2017-07-25 DIAGNOSIS — J32.9 CHRONIC SINUSITIS, UNSPECIFIED LOCATION: ICD-10-CM

## 2017-07-25 DIAGNOSIS — F17.210 CIGARETTE NICOTINE DEPENDENCE WITHOUT COMPLICATION: ICD-10-CM

## 2017-07-25 DIAGNOSIS — Z12.5 SPECIAL SCREENING FOR MALIGNANT NEOPLASM OF PROSTATE: ICD-10-CM

## 2017-07-25 DIAGNOSIS — S46.212D RUPTURE OF DISTAL BICEPS TENDON, LEFT, SUBSEQUENT ENCOUNTER: ICD-10-CM

## 2017-07-25 DIAGNOSIS — M96.1 FAILED BACK SYNDROME: Chronic | ICD-10-CM

## 2017-07-25 DIAGNOSIS — I87.2 CHRONIC VENOUS INSUFFICIENCY: Chronic | ICD-10-CM

## 2017-07-25 DIAGNOSIS — R20.2 PARESTHESIA OF BOTH FEET: ICD-10-CM

## 2017-07-25 DIAGNOSIS — H10.13 ALLERGIC CONJUNCTIVITIS, BILATERAL: ICD-10-CM

## 2017-07-25 DIAGNOSIS — R31.29 MICROSCOPIC HEMATURIA: ICD-10-CM

## 2017-07-25 PROCEDURE — 3017F COLORECTAL CA SCREEN DOC REV: CPT | Performed by: FAMILY MEDICINE

## 2017-07-25 PROCEDURE — 4004F PT TOBACCO SCREEN RCVD TLK: CPT | Performed by: FAMILY MEDICINE

## 2017-07-25 PROCEDURE — 99214 OFFICE O/P EST MOD 30 MIN: CPT | Performed by: FAMILY MEDICINE

## 2017-07-25 PROCEDURE — G8419 CALC BMI OUT NRM PARAM NOF/U: HCPCS | Performed by: FAMILY MEDICINE

## 2017-07-25 PROCEDURE — G8427 DOCREV CUR MEDS BY ELIG CLIN: HCPCS | Performed by: FAMILY MEDICINE

## 2017-07-25 RX ORDER — OXYCODONE HYDROCHLORIDE 30 MG/1
30 TABLET ORAL EVERY 4 HOURS PRN
Qty: 180 TABLET | Refills: 0 | Status: CANCELLED | OUTPATIENT
Start: 2017-07-25

## 2017-07-25 RX ORDER — FUROSEMIDE 20 MG/1
20 TABLET ORAL DAILY PRN
Qty: 60 TABLET | Refills: 5 | Status: SHIPPED | OUTPATIENT
Start: 2017-07-25 | End: 2017-07-25 | Stop reason: SDUPTHER

## 2017-07-25 RX ORDER — FUROSEMIDE 20 MG/1
TABLET ORAL
Qty: 90 TABLET | Refills: 3 | Status: SHIPPED | OUTPATIENT
Start: 2017-07-25 | End: 2018-05-18 | Stop reason: SDUPTHER

## 2017-07-25 ASSESSMENT — PATIENT HEALTH QUESTIONNAIRE - PHQ9
SUM OF ALL RESPONSES TO PHQ9 QUESTIONS 1 & 2: 0
SUM OF ALL RESPONSES TO PHQ QUESTIONS 1-9: 0
1. LITTLE INTEREST OR PLEASURE IN DOING THINGS: 0
2. FEELING DOWN, DEPRESSED OR HOPELESS: 0

## 2017-07-27 ENCOUNTER — TELEPHONE (OUTPATIENT)
Dept: FAMILY MEDICINE CLINIC | Age: 57
End: 2017-07-27

## 2017-07-27 DIAGNOSIS — S46.212D RUPTURE OF DISTAL BICEPS TENDON, LEFT, SUBSEQUENT ENCOUNTER: ICD-10-CM

## 2017-07-27 DIAGNOSIS — M96.1 FAILED BACK SYNDROME: Chronic | ICD-10-CM

## 2017-07-27 DIAGNOSIS — R31.29 MICROSCOPIC HEMATURIA: Primary | ICD-10-CM

## 2017-07-27 DIAGNOSIS — M51.35 DDD (DEGENERATIVE DISC DISEASE), THORACOLUMBAR: Primary | Chronic | ICD-10-CM

## 2017-07-27 RX ORDER — OXYCODONE HYDROCHLORIDE 30 MG/1
30 TABLET ORAL EVERY 4 HOURS PRN
Qty: 180 TABLET | Refills: 0 | Status: SHIPPED | OUTPATIENT
Start: 2017-07-27 | End: 2017-08-24 | Stop reason: SDUPTHER

## 2017-07-28 ENCOUNTER — TELEPHONE (OUTPATIENT)
Dept: FAMILY MEDICINE CLINIC | Age: 57
End: 2017-07-28

## 2017-08-02 ENCOUNTER — HOSPITAL ENCOUNTER (OUTPATIENT)
Age: 57
End: 2017-08-02
Payer: MEDICARE

## 2017-08-02 ENCOUNTER — HOSPITAL ENCOUNTER (OUTPATIENT)
Dept: CT IMAGING | Age: 57
Discharge: HOME | End: 2017-08-02
Payer: MEDICARE

## 2017-08-02 DIAGNOSIS — R31.29 MICROSCOPIC HEMATURIA: ICD-10-CM

## 2017-08-02 PROCEDURE — 74178 CT ABD&PLV WO CNTR FLWD CNTR: CPT

## 2017-08-02 PROCEDURE — 6360000004 HC RX CONTRAST MEDICATION: Performed by: FAMILY MEDICINE

## 2017-08-03 ENCOUNTER — OFFICE VISIT (OUTPATIENT)
Dept: UROLOGY | Age: 57
End: 2017-08-03
Payer: MEDICARE

## 2017-08-03 ENCOUNTER — TELEPHONE (OUTPATIENT)
Dept: FAMILY MEDICINE CLINIC | Age: 57
End: 2017-08-03

## 2017-08-03 ENCOUNTER — TELEPHONE (OUTPATIENT)
Dept: CARDIOLOGY CLINIC | Age: 57
End: 2017-08-03

## 2017-08-03 ENCOUNTER — TELEPHONE (OUTPATIENT)
Dept: UROLOGY | Age: 57
End: 2017-08-03

## 2017-08-03 VITALS
SYSTOLIC BLOOD PRESSURE: 118 MMHG | HEIGHT: 65 IN | WEIGHT: 155 LBS | BODY MASS INDEX: 25.83 KG/M2 | DIASTOLIC BLOOD PRESSURE: 84 MMHG

## 2017-08-03 DIAGNOSIS — R39.12 WEAK URINARY STREAM: ICD-10-CM

## 2017-08-03 DIAGNOSIS — R31.29 MICROSCOPIC HEMATURIA: Primary | ICD-10-CM

## 2017-08-03 LAB
BILIRUBIN URINE: NORMAL
BLOOD URINE, POC: NORMAL
CHARACTER, URINE: CLEAR
COLOR, URINE: NORMAL
GLUCOSE URINE: NEGATIVE MG/DL
KETONES, URINE: NEGATIVE
LEUKOCYTE CLUMPS, URINE: NEGATIVE
NITRITE, URINE: NEGATIVE
PH, URINE: 5.5
POST VOID RESIDUAL (PVR): 24 ML
PROTEIN, URINE: NEGATIVE MG/DL
SPECIFIC GRAVITY, URINE: 1.01 (ref 1–1.03)
UROBILINOGEN, URINE: 2 EU/DL

## 2017-08-03 PROCEDURE — G8427 DOCREV CUR MEDS BY ELIG CLIN: HCPCS | Performed by: UROLOGY

## 2017-08-03 PROCEDURE — 4004F PT TOBACCO SCREEN RCVD TLK: CPT | Performed by: UROLOGY

## 2017-08-03 PROCEDURE — 81003 URINALYSIS AUTO W/O SCOPE: CPT | Performed by: UROLOGY

## 2017-08-03 PROCEDURE — 3017F COLORECTAL CA SCREEN DOC REV: CPT | Performed by: UROLOGY

## 2017-08-03 PROCEDURE — 99202 OFFICE O/P NEW SF 15 MIN: CPT | Performed by: UROLOGY

## 2017-08-03 PROCEDURE — 51798 US URINE CAPACITY MEASURE: CPT | Performed by: UROLOGY

## 2017-08-03 PROCEDURE — G8419 CALC BMI OUT NRM PARAM NOF/U: HCPCS | Performed by: UROLOGY

## 2017-08-03 PROCEDURE — 52000 CYSTOURETHROSCOPY: CPT | Performed by: UROLOGY

## 2017-08-03 ASSESSMENT — ENCOUNTER SYMPTOMS
COLOR CHANGE: 0
CHEST TIGHTNESS: 0
SHORTNESS OF BREATH: 0
EYE REDNESS: 0
ABDOMINAL PAIN: 0
VOMITING: 0
FACIAL SWELLING: 0
EYE PAIN: 0

## 2017-08-04 LAB — CYTOLOGY-NON GYN: NORMAL

## 2017-08-06 ASSESSMENT — LIFESTYLE VARIABLES: TOBACCO_USE: 1

## 2017-08-07 ENCOUNTER — TELEPHONE (OUTPATIENT)
Dept: FAMILY MEDICINE CLINIC | Age: 57
End: 2017-08-07

## 2017-08-10 ENCOUNTER — TELEPHONE (OUTPATIENT)
Dept: FAMILY MEDICINE CLINIC | Age: 57
End: 2017-08-10

## 2017-08-10 ENCOUNTER — OFFICE VISIT (OUTPATIENT)
Dept: FAMILY MEDICINE CLINIC | Age: 57
End: 2017-08-10
Payer: MEDICARE

## 2017-08-10 VITALS
BODY MASS INDEX: 25.23 KG/M2 | TEMPERATURE: 98.3 F | RESPIRATION RATE: 10 BRPM | HEART RATE: 80 BPM | DIASTOLIC BLOOD PRESSURE: 64 MMHG | SYSTOLIC BLOOD PRESSURE: 111 MMHG | WEIGHT: 157 LBS | HEIGHT: 66 IN

## 2017-08-10 DIAGNOSIS — S40.812A ABRASION OF LEFT UPPER ARM, INITIAL ENCOUNTER: ICD-10-CM

## 2017-08-10 DIAGNOSIS — I50.42 CHRONIC COMBINED SYSTOLIC AND DIASTOLIC HEART FAILURE (HCC): Chronic | ICD-10-CM

## 2017-08-10 DIAGNOSIS — R60.0 BILATERAL EDEMA OF LOWER EXTREMITY: ICD-10-CM

## 2017-08-10 DIAGNOSIS — F17.210 CIGARETTE NICOTINE DEPENDENCE WITHOUT COMPLICATION: Chronic | ICD-10-CM

## 2017-08-10 DIAGNOSIS — K11.7 SIALORRHEA: Primary | ICD-10-CM

## 2017-08-10 PROCEDURE — 3017F COLORECTAL CA SCREEN DOC REV: CPT | Performed by: FAMILY MEDICINE

## 2017-08-10 PROCEDURE — 99214 OFFICE O/P EST MOD 30 MIN: CPT | Performed by: FAMILY MEDICINE

## 2017-08-10 PROCEDURE — G8419 CALC BMI OUT NRM PARAM NOF/U: HCPCS | Performed by: FAMILY MEDICINE

## 2017-08-10 PROCEDURE — 4004F PT TOBACCO SCREEN RCVD TLK: CPT | Performed by: FAMILY MEDICINE

## 2017-08-10 PROCEDURE — G8427 DOCREV CUR MEDS BY ELIG CLIN: HCPCS | Performed by: FAMILY MEDICINE

## 2017-08-10 RX ORDER — DOXYCYCLINE HYCLATE 100 MG/1
100 CAPSULE ORAL 2 TIMES DAILY
Qty: 20 CAPSULE | Refills: 0 | Status: SHIPPED | OUTPATIENT
Start: 2017-08-10 | End: 2017-08-20

## 2017-08-10 RX ORDER — GLYCOPYRROLATE 1 MG/1
1 TABLET ORAL 3 TIMES DAILY
Qty: 90 TABLET | Refills: 3 | Status: CANCELLED | OUTPATIENT
Start: 2017-08-10

## 2017-08-12 DIAGNOSIS — M96.1 FAILED BACK SYNDROME: Chronic | ICD-10-CM

## 2017-08-12 DIAGNOSIS — M51.35 DDD (DEGENERATIVE DISC DISEASE), THORACOLUMBAR: Chronic | ICD-10-CM

## 2017-08-14 ENCOUNTER — TELEPHONE (OUTPATIENT)
Dept: FAMILY MEDICINE CLINIC | Age: 57
End: 2017-08-14

## 2017-08-14 DIAGNOSIS — M51.35 DDD (DEGENERATIVE DISC DISEASE), THORACOLUMBAR: Chronic | ICD-10-CM

## 2017-08-14 DIAGNOSIS — M96.1 FAILED BACK SYNDROME: Primary | Chronic | ICD-10-CM

## 2017-08-14 DIAGNOSIS — M96.1 FAILED BACK SYNDROME: Chronic | ICD-10-CM

## 2017-08-14 RX ORDER — LIDOCAINE HYDROCHLORIDE 30 MG/G
CREAM TOPICAL
Qty: 5 TUBE | Refills: 5 | Status: SHIPPED | OUTPATIENT
Start: 2017-08-14 | End: 2017-08-15 | Stop reason: CLARIF

## 2017-08-15 ENCOUNTER — TELEPHONE (OUTPATIENT)
Dept: FAMILY MEDICINE CLINIC | Age: 57
End: 2017-08-15

## 2017-08-16 ENCOUNTER — HOSPITAL ENCOUNTER (OUTPATIENT)
Dept: NON INVASIVE DIAGNOSTICS | Age: 57
Discharge: HOME | End: 2017-08-16
Payer: MEDICARE

## 2017-08-16 ENCOUNTER — HOSPITAL ENCOUNTER (OUTPATIENT)
Dept: INTERVENTIONAL RADIOLOGY/VASCULAR | Age: 57
Discharge: HOME | End: 2017-08-16
Payer: MEDICARE

## 2017-08-16 DIAGNOSIS — R60.0 BILATERAL EDEMA OF LOWER EXTREMITY: ICD-10-CM

## 2017-08-16 DIAGNOSIS — M25.522 LEFT ELBOW PAIN: ICD-10-CM

## 2017-08-16 DIAGNOSIS — I50.42 CHRONIC COMBINED SYSTOLIC AND DIASTOLIC HEART FAILURE (HCC): Chronic | ICD-10-CM

## 2017-08-16 LAB
LV EF: 55 %
LVEF MODALITY: NORMAL

## 2017-08-16 PROCEDURE — 93306 TTE W/DOPPLER COMPLETE: CPT

## 2017-08-16 PROCEDURE — 93970 EXTREMITY STUDY: CPT

## 2017-08-16 RX ORDER — ALBUTEROL SULFATE 2.5 MG/3ML
SOLUTION RESPIRATORY (INHALATION)
Qty: 360 ML | Refills: 3 | Status: SHIPPED | OUTPATIENT
Start: 2017-08-16 | End: 2018-09-13 | Stop reason: SDUPTHER

## 2017-08-17 ENCOUNTER — TELEPHONE (OUTPATIENT)
Dept: FAMILY MEDICINE CLINIC | Age: 57
End: 2017-08-17

## 2017-08-18 ENCOUNTER — TELEPHONE (OUTPATIENT)
Dept: FAMILY MEDICINE CLINIC | Age: 57
End: 2017-08-18

## 2017-08-24 ENCOUNTER — HOSPITAL ENCOUNTER (OUTPATIENT)
Dept: INFUSION THERAPY | Age: 57
Discharge: HOME | End: 2017-08-24
Payer: MEDICARE

## 2017-08-24 ENCOUNTER — OFFICE VISIT (OUTPATIENT)
Dept: ONCOLOGY | Age: 57
End: 2017-08-24
Payer: MEDICARE

## 2017-08-24 VITALS
HEART RATE: 76 BPM | BODY MASS INDEX: 26.23 KG/M2 | HEIGHT: 65 IN | DIASTOLIC BLOOD PRESSURE: 93 MMHG | TEMPERATURE: 97.3 F | WEIGHT: 157.4 LBS | OXYGEN SATURATION: 96 % | SYSTOLIC BLOOD PRESSURE: 133 MMHG | RESPIRATION RATE: 18 BRPM

## 2017-08-24 DIAGNOSIS — M51.35 DDD (DEGENERATIVE DISC DISEASE), THORACOLUMBAR: Chronic | ICD-10-CM

## 2017-08-24 DIAGNOSIS — M96.1 FAILED BACK SYNDROME: Chronic | ICD-10-CM

## 2017-08-24 DIAGNOSIS — C83.33 DIFFUSE LARGE B-CELL LYMPHOMA OF INTRA-ABDOMINAL LYMPH NODES (HCC): ICD-10-CM

## 2017-08-24 DIAGNOSIS — S46.212D RUPTURE OF DISTAL BICEPS TENDON, LEFT, SUBSEQUENT ENCOUNTER: ICD-10-CM

## 2017-08-24 DIAGNOSIS — C83.33 DIFFUSE LARGE B-CELL LYMPHOMA OF INTRA-ABDOMINAL LYMPH NODES (HCC): Primary | ICD-10-CM

## 2017-08-24 LAB
ALBUMIN SERPL-MCNC: 3.9 G/DL (ref 3.5–5.1)
ALP BLD-CCNC: 136 U/L (ref 38–126)
ALT SERPL-CCNC: 26 U/L (ref 11–66)
AST SERPL-CCNC: 35 U/L (ref 5–40)
BASINOPHIL, AUTOMATED: 0 % (ref 0–12)
BILIRUB SERPL-MCNC: 0.6 MG/DL (ref 0.3–1.2)
BILIRUBIN DIRECT: < 0.2 MG/DL (ref 0–0.3)
BUN, WHOLE BLOOD: 19 MG/DL (ref 8–26)
CHLORIDE, WHOLE BLOOD: 101 MEQ/L (ref 98–109)
CREATININE, WHOLE BLOOD: 1 MG/DL (ref 0.5–1.2)
EOSINOPHILS RELATIVE PERCENT: 5 % (ref 0–12)
GFR, ESTIMATED: 81 ML/MIN/1.73M2
GLUCOSE, WHOLE BLOOD: 102 MG/DL (ref 70–108)
HCT VFR BLD CALC: 36.9 % (ref 42–52)
HEMOGLOBIN: 12.2 GM/DL (ref 14–18)
IONIZED CALCIUM, WHOLE BLOOD: 1.17 MMOL/L (ref 1.12–1.32)
LYMPHOCYTES # BLD: 22 % (ref 15–47)
MCH RBC QN AUTO: 29.4 PG (ref 27–31)
MCHC RBC AUTO-ENTMCNC: 32.9 GM/DL (ref 33–37)
MCV RBC AUTO: 89 FL (ref 80–94)
MONOCYTES: 11 % (ref 0–12)
PDW BLD-RTO: 13.6 % (ref 11.5–14.5)
PLATELET # BLD: 135 THOU/MM3 (ref 130–400)
PMV BLD AUTO: 8.8 MCM (ref 7.4–10.4)
POTASSIUM, WHOLE BLOOD: 4.4 MEQ/L (ref 3.5–4.9)
RBC # BLD: 4.14 MILL/MM3 (ref 4.7–6.1)
SEG NEUTROPHILS: 63 % (ref 43–75)
SODIUM, WHOLE BLOOD: 138 MEQ/L (ref 138–146)
TOTAL CO2, WHOLE BLOOD: 28 MEQ/L (ref 23–33)
TOTAL PROTEIN: 6.9 G/DL (ref 6.1–8)
WBC # BLD: 5.8 THOU/MM3 (ref 4.8–10.8)

## 2017-08-24 PROCEDURE — G8419 CALC BMI OUT NRM PARAM NOF/U: HCPCS | Performed by: INTERNAL MEDICINE

## 2017-08-24 PROCEDURE — 99211 OFF/OP EST MAY X REQ PHY/QHP: CPT

## 2017-08-24 PROCEDURE — 4004F PT TOBACCO SCREEN RCVD TLK: CPT | Performed by: INTERNAL MEDICINE

## 2017-08-24 PROCEDURE — 36415 COLL VENOUS BLD VENIPUNCTURE: CPT

## 2017-08-24 PROCEDURE — 99214 OFFICE O/P EST MOD 30 MIN: CPT | Performed by: INTERNAL MEDICINE

## 2017-08-24 PROCEDURE — 80076 HEPATIC FUNCTION PANEL: CPT

## 2017-08-24 PROCEDURE — 80047 BASIC METABLC PNL IONIZED CA: CPT

## 2017-08-24 PROCEDURE — 85025 COMPLETE CBC W/AUTO DIFF WBC: CPT

## 2017-08-24 PROCEDURE — 3017F COLORECTAL CA SCREEN DOC REV: CPT | Performed by: INTERNAL MEDICINE

## 2017-08-24 PROCEDURE — G8427 DOCREV CUR MEDS BY ELIG CLIN: HCPCS | Performed by: INTERNAL MEDICINE

## 2017-08-25 ENCOUNTER — TELEPHONE (OUTPATIENT)
Dept: FAMILY MEDICINE CLINIC | Age: 57
End: 2017-08-25

## 2017-08-25 RX ORDER — OXYCODONE HYDROCHLORIDE 30 MG/1
30 TABLET ORAL EVERY 4 HOURS PRN
Qty: 180 TABLET | Refills: 0 | Status: SHIPPED | OUTPATIENT
Start: 2017-08-25 | End: 2017-09-22 | Stop reason: SDUPTHER

## 2017-08-26 ENCOUNTER — TELEPHONE (OUTPATIENT)
Dept: FAMILY MEDICINE CLINIC | Age: 57
End: 2017-08-26

## 2017-09-21 DIAGNOSIS — S46.212D RUPTURE OF DISTAL BICEPS TENDON, LEFT, SUBSEQUENT ENCOUNTER: ICD-10-CM

## 2017-09-21 DIAGNOSIS — M96.1 FAILED BACK SYNDROME: Chronic | ICD-10-CM

## 2017-09-21 DIAGNOSIS — M51.35 DDD (DEGENERATIVE DISC DISEASE), THORACOLUMBAR: Primary | Chronic | ICD-10-CM

## 2017-09-21 DIAGNOSIS — R11.0 NAUSEA: ICD-10-CM

## 2017-09-21 RX ORDER — ONDANSETRON 4 MG/1
4 TABLET, FILM COATED ORAL DAILY PRN
Qty: 60 TABLET | Refills: 1 | Status: SHIPPED | OUTPATIENT
Start: 2017-09-21 | End: 2017-10-05

## 2017-09-21 RX ORDER — OXYCODONE HYDROCHLORIDE 30 MG/1
30 TABLET ORAL EVERY 4 HOURS PRN
Qty: 180 TABLET | Refills: 0 | OUTPATIENT
Start: 2017-09-21

## 2017-09-22 DIAGNOSIS — H10.13 ALLERGIC CONJUNCTIVITIS, BILATERAL: Primary | ICD-10-CM

## 2017-09-22 RX ORDER — OXYCODONE HYDROCHLORIDE 30 MG/1
30 TABLET ORAL EVERY 4 HOURS PRN
Qty: 180 TABLET | Refills: 0 | Status: SHIPPED | OUTPATIENT
Start: 2017-09-22 | End: 2017-09-23 | Stop reason: DRUGHIGH

## 2017-09-23 ENCOUNTER — TELEPHONE (OUTPATIENT)
Dept: FAMILY MEDICINE CLINIC | Age: 57
End: 2017-09-23

## 2017-09-23 DIAGNOSIS — S46.212D RUPTURE OF DISTAL BICEPS TENDON, LEFT, SUBSEQUENT ENCOUNTER: ICD-10-CM

## 2017-09-23 DIAGNOSIS — M51.35 DDD (DEGENERATIVE DISC DISEASE), THORACOLUMBAR: Chronic | ICD-10-CM

## 2017-09-23 DIAGNOSIS — G89.4 CHRONIC PAIN SYNDROME: ICD-10-CM

## 2017-09-23 DIAGNOSIS — M96.1 FAILED BACK SYNDROME: Primary | Chronic | ICD-10-CM

## 2017-09-23 RX ORDER — OLOPATADINE HCL 0.2 %
DROPS OPHTHALMIC (EYE)
Qty: 3 BOTTLE | Refills: 3 | Status: SHIPPED | OUTPATIENT
Start: 2017-09-23 | End: 2017-11-02 | Stop reason: CLARIF

## 2017-09-23 RX ORDER — OXYCODONE HYDROCHLORIDE 20 MG/1
20 TABLET ORAL EVERY 6 HOURS PRN
Qty: 28 TABLET | Refills: 0 | Status: SHIPPED | OUTPATIENT
Start: 2017-09-23 | End: 2017-09-28 | Stop reason: SDUPTHER

## 2017-09-25 ENCOUNTER — OFFICE VISIT (OUTPATIENT)
Dept: FAMILY MEDICINE CLINIC | Age: 57
End: 2017-09-25
Payer: MEDICARE

## 2017-09-25 VITALS
RESPIRATION RATE: 16 BRPM | HEART RATE: 83 BPM | BODY MASS INDEX: 26.96 KG/M2 | DIASTOLIC BLOOD PRESSURE: 84 MMHG | HEIGHT: 65 IN | WEIGHT: 161.8 LBS | TEMPERATURE: 99 F | SYSTOLIC BLOOD PRESSURE: 122 MMHG

## 2017-09-25 DIAGNOSIS — G89.4 CHRONIC PAIN SYNDROME: ICD-10-CM

## 2017-09-25 DIAGNOSIS — S46.212D RUPTURE OF DISTAL BICEPS TENDON, LEFT, SUBSEQUENT ENCOUNTER: ICD-10-CM

## 2017-09-25 DIAGNOSIS — M51.35 DDD (DEGENERATIVE DISC DISEASE), THORACOLUMBAR: Primary | Chronic | ICD-10-CM

## 2017-09-25 DIAGNOSIS — F17.210 CIGARETTE NICOTINE DEPENDENCE WITHOUT COMPLICATION: Chronic | ICD-10-CM

## 2017-09-25 DIAGNOSIS — M75.101 TEAR OF RIGHT ROTATOR CUFF, UNSPECIFIED TEAR EXTENT: ICD-10-CM

## 2017-09-25 DIAGNOSIS — M96.1 FAILED BACK SYNDROME: Chronic | ICD-10-CM

## 2017-09-25 PROCEDURE — 3017F COLORECTAL CA SCREEN DOC REV: CPT | Performed by: FAMILY MEDICINE

## 2017-09-25 PROCEDURE — 99214 OFFICE O/P EST MOD 30 MIN: CPT | Performed by: FAMILY MEDICINE

## 2017-09-25 PROCEDURE — G8427 DOCREV CUR MEDS BY ELIG CLIN: HCPCS | Performed by: FAMILY MEDICINE

## 2017-09-25 PROCEDURE — 4004F PT TOBACCO SCREEN RCVD TLK: CPT | Performed by: FAMILY MEDICINE

## 2017-09-25 PROCEDURE — G8417 CALC BMI ABV UP PARAM F/U: HCPCS | Performed by: FAMILY MEDICINE

## 2017-09-25 RX ORDER — ALBUTEROL SULFATE 90 UG/1
2 AEROSOL, METERED RESPIRATORY (INHALATION) EVERY 6 HOURS PRN
Qty: 1 INHALER | Refills: 0 | Status: SHIPPED | OUTPATIENT
Start: 2017-09-25 | End: 2017-11-02 | Stop reason: SDUPTHER

## 2017-09-27 DIAGNOSIS — M19.90 ARTHRITIS: Chronic | ICD-10-CM

## 2017-09-27 RX ORDER — NAPROXEN 500 MG/1
TABLET ORAL
Qty: 60 TABLET | Refills: 0 | Status: SHIPPED | OUTPATIENT
Start: 2017-09-27 | End: 2017-10-22 | Stop reason: SDUPTHER

## 2017-09-28 DIAGNOSIS — S46.212D RUPTURE OF DISTAL BICEPS TENDON, LEFT, SUBSEQUENT ENCOUNTER: ICD-10-CM

## 2017-09-28 DIAGNOSIS — G89.4 CHRONIC PAIN SYNDROME: ICD-10-CM

## 2017-09-28 DIAGNOSIS — M96.1 FAILED BACK SYNDROME: Chronic | ICD-10-CM

## 2017-09-28 DIAGNOSIS — M51.35 DDD (DEGENERATIVE DISC DISEASE), THORACOLUMBAR: Chronic | ICD-10-CM

## 2017-09-29 RX ORDER — OXYCODONE HYDROCHLORIDE 20 MG/1
20 TABLET ORAL EVERY 6 HOURS PRN
Qty: 84 TABLET | Refills: 0 | Status: SHIPPED | OUTPATIENT
Start: 2017-09-29 | End: 2017-10-10 | Stop reason: DRUGHIGH

## 2017-10-03 ENCOUNTER — TELEPHONE (OUTPATIENT)
Dept: CARDIOLOGY CLINIC | Age: 57
End: 2017-10-03

## 2017-10-03 ENCOUNTER — OFFICE VISIT (OUTPATIENT)
Dept: FAMILY MEDICINE CLINIC | Age: 57
End: 2017-10-03
Payer: MEDICARE

## 2017-10-03 VITALS
SYSTOLIC BLOOD PRESSURE: 138 MMHG | TEMPERATURE: 98.4 F | DIASTOLIC BLOOD PRESSURE: 82 MMHG | RESPIRATION RATE: 12 BRPM | HEIGHT: 65 IN | HEART RATE: 68 BPM | BODY MASS INDEX: 27.22 KG/M2 | WEIGHT: 163.4 LBS

## 2017-10-03 DIAGNOSIS — M51.37 DDD (DEGENERATIVE DISC DISEASE), LUMBOSACRAL: Primary | ICD-10-CM

## 2017-10-03 PROCEDURE — 4004F PT TOBACCO SCREEN RCVD TLK: CPT | Performed by: FAMILY MEDICINE

## 2017-10-03 PROCEDURE — G8484 FLU IMMUNIZE NO ADMIN: HCPCS | Performed by: FAMILY MEDICINE

## 2017-10-03 PROCEDURE — G8427 DOCREV CUR MEDS BY ELIG CLIN: HCPCS | Performed by: FAMILY MEDICINE

## 2017-10-03 PROCEDURE — 3017F COLORECTAL CA SCREEN DOC REV: CPT | Performed by: FAMILY MEDICINE

## 2017-10-03 PROCEDURE — 99213 OFFICE O/P EST LOW 20 MIN: CPT | Performed by: FAMILY MEDICINE

## 2017-10-03 PROCEDURE — G8417 CALC BMI ABV UP PARAM F/U: HCPCS | Performed by: FAMILY MEDICINE

## 2017-10-03 RX ORDER — AMITRIPTYLINE HYDROCHLORIDE 25 MG/1
25 TABLET, FILM COATED ORAL NIGHTLY
Qty: 30 TABLET | Refills: 5 | Status: SHIPPED | OUTPATIENT
Start: 2017-10-03 | End: 2017-11-02

## 2017-10-03 RX ORDER — BACLOFEN 10 MG/1
10 TABLET ORAL 3 TIMES DAILY
Qty: 90 TABLET | Refills: 3 | Status: SHIPPED | OUTPATIENT
Start: 2017-10-03 | End: 2017-10-05

## 2017-10-03 NOTE — PROGRESS NOTES
Thoracic aortic aneurysm [facility])        Past Surgical History:   Procedure Laterality Date    APPENDECTOMY  age 14     BACK SURGERY  3/31/2014    Lumbar Laminectomy L3-5    COLONOSCOPY  2013    Dr. Rula Roche 2014    Billiary Stent    UMBILICAL HERNIA REPAIR  age 13    [facility] VENTRAL HERNIA REPAIR  10/5/2015    w mesh       Social History   Substance Use Topics    Smoking status: Current Every Day Smoker     Packs/day: 1.00     Years: 30.00     Types: Cigarettes    Smokeless tobacco: Never Used      Comment: smoking cessation information given at past appt    Alcohol use No       Family History   Problem Relation Age of Onset    Heart Disease Mother     Diabetes Mother     High Blood Pressure Mother     High Cholesterol Mother     High Blood Pressure Father     High Cholesterol Father     Cancer Paternal Grandfather      colon    Diabetes Maternal Grandmother          I have reviewed the patient's past medical history, past surgical history, allergies, medications, social and family history and I have made updates where appropriate.     ROS        PHYSICAL EXAM:  /82  Pulse 68  Temp 98.4 °F (36.9 °C) (Oral)   Resp 12  Ht 5' 4.57\" (1.64 m)  Wt 163 lb 6.4 oz (74.1 kg)  BMI 27.56 kg/m2    General Appearance: well developed and well- nourished, in no acute distress  Head: normocephalic and atraumatic  ENT: external ear and ear canal normal bilaterally, nose without deformity, nasal mucosa and turbinates normal without polyps, oropharynx normal, dentition is normal for age, no lip or gum lesions noted  Neck: supple and non-tender without mass, no thyromegaly or thyroid nodules, no cervical lymphadenopathy  Pulmonary/Chest: clear to auscultation bilaterally- no wheezes, rales or rhonchi, normal air movement, no respiratory distress or retractions  Cardiovascular: normal rate, regular rhythm, normal S1 and S2, no murmurs, rubs, clicks, or gallops, distal pulses intactaly  Extremities: no cyanosis, clubbing or edema of the lower extremities  Psych:  Normal affect without evidence of depression or anxiety, insight and judgement are appropriate, memory appears intact  Skin: warm and dry, no rash or erythema      ASSESSMENT & PLAN  Melyssa Lombardi was seen today for back pain. Diagnoses and all orders for this visit:    DDD (degenerative disc disease), lumbosacral  -     amitriptyline (ELAVIL) 25 MG tablet; Take 1 tablet by mouth nightly  -     baclofen (LIORESAL) 10 MG tablet; Take 1 tablet by mouth 3 times daily    -Case was discussed with Dr Jesu Nguyen yesterday  -Will stop the Zanaflex and start Baclofen  -Will also add Elavil for neuropathic pain  -Continue oxycodone at current dosing  -Patient to return in 2 weeks to readdress pain.  -Patient advised to call immediately or go to ER if any worsening of symptoms      Return in about 2 weeks (around 10/17/2017), or if symptoms worsen or fail to improve. Melyssa Lombardi received counseling on the following healthy behaviors: medication adherence  Reviewed prior labs and health maintenance. Continue current medications, diet and exercise. Discussed use, benefit, and side effects of prescribed medications. Barriers to medication compliance addressed. Patient given educational materials - see patient instructions. All patient questions answered. Patient voiced understanding.

## 2017-10-03 NOTE — MR AVS SNAPSHOT
After Visit Summary             Stacey Benton Sr.    8:20 AM   Office Visit    Description:  Male : 1960   Provider:  Max Sever, DO   Department:  [facility] and Future Appointments         Below is a list of your follow-up and future appointments. This may not be a complete list as you may have made appointments directly with providers that we are not aware of or your providers may have made some for you. Please call your providers to confirm appointments. It is important to keep your appointments. Please bring your current insurance card, photo ID, co-pay, and all medication bottles to your appointment. If self-pay, payment is expected at the time of service. Your To-Do List     Future Appointments Provider Department Dept Phone    10/6/2017 11:45 AM Sheyla Boyers, NP [facility] [phone]    Please arrive 15 minutes prior to appointment, bring photo ID and insurance card. 10/24/2017 2:20 PM Jeni Beard DO [facility] [phone]    Please arrive 15 minutes prior to appointment, bring photo ID and insurance card. 2018 10:30 AM Jacoby Haywood MD [facility]. [facility] [phone]    Please arrive 15 minutes prior to appointment, bring photo ID and insurance card. Please arrive 15 minutes prior to appointment, bring photo ID and insurance card. Follow-Up    Return in about 2 weeks (around 10/17/2017), or if symptoms worsen or fail to improve.          Information from Your Visit        Department     Name Address Phone Fax    [address]  [address] [address] [phone]      You Were Seen for:         Comments    DDD (degenerative disc disease), lumbosacral   [749500]         Vital Signs     Blood Pressure Pulse Temperature Respirations Height Weight    138/82 68 98.4 °F (36.9 °C) (Oral) 12 5' 4.57\" (1.64 m) 163 lb 6.4 oz (74.1 kg) oxyCODONE (ROXICODONE) 20 MG immediate release tablet Take 1 tablet by mouth every 6 hours as needed for Pain  For 7 days. .    naproxen (NAPROSYN) 500 MG tablet TAKE 1 TABLET BY MOUTH TWICE DAILY AS NEEDED FOR PAIN    albuterol sulfate HFA (VENTOLIN HFA) 108 (90 Base) MCG/ACT inhaler Inhale 2 puffs into the lungs every 6 hours as needed for Wheezing To replace lost inhaler    PATADAY 0.2 % SOLN ophthalmic solution INSTILL 1 DROP IN BOTH EYES EVERY DAY. ondansetron (ZOFRAN) 4 MG tablet Take 1 tablet by mouth daily as needed for Nausea or Vomiting    albuterol (PROVENTIL) (2.5 MG/3ML) 0.083% nebulizer solution USE 1 VIAL VIA NEBULIZER EVERY 4 HOURS AS NEEDED FOR WHEEZING OR SHORTNESS OF BREATH    lidocaine (XYLOCAINE) 2 % jelly Apply topically as needed.     furosemide (LASIX) 20 MG tablet TAKE 1 TABLET BY MOUTH EVERY DAY AS NEEDED FOR LEG SWELLING    guaiFENesin (MUCINEX) 600 MG extended release tablet Take 1 tablet by mouth 2 times daily as needed for Congestion    metoprolol tartrate (LOPRESSOR) 25 MG tablet TAKE 1 TABLET BY MOUTH TWICE DAILY    atorvastatin (LIPITOR) 10 MG tablet TAKE 1 TABLET BY MOUTH ONCE DAILY    butalbital-acetaminophen-caffeine (FIORICET, ESGIC) -40 MG per tablet TAKE 1 TABLET BY MOUTH EVERY 6 HOURS AS NEEDED FOR HEADACHE    polyethylene glycol (GLYCOLAX) powder daily    calcium-cholecalciferol 500-200 MG-UNIT per tablet Take 1 tablet by mouth 2 times daily    albuterol sulfate HFA (VENTOLIN HFA) 108 (90 BASE) MCG/ACT inhaler INHALE 2 PUFFS INTO THE LUNGS EVERY 6 HOURS AS NEEDED FOR WHEEZING    busPIRone (BUSPAR) 15 MG tablet TAKE 1 TABLET BY MOUTH EVERY 6 HOURS    Elastic Bandages & Supports (KNEE BRACE) MISC hard knee brace    nystatin (MYCOSTATIN) 833368 UNIT/ML suspension Take 5 mLs by mouth 4 times daily    aspirin 81 MG EC tablet Take 1 tablet by mouth daily    omeprazole (PRILOSEC) 20 MG delayed release capsule TAKE 1 CAPSULE BY MOUTH DAILY Failed back syndrome (Chronic)    Allergic rhinitis (Chronic)    Chronic pain syndrome (Chronic)    Insomnia (Chronic)    Complication of chemotherapy    History of chemotherapy    Headache    Diffuse large B cell lymphoma ([facility] [address] [facility]  [facility]    Chronic combined systolic and diastolic heart failure (HCC) (Chronic)    DDD (degenerative disc disease), thoracolumbar (Chronic)    Arthritis (Chronic)    Sleep apnea (Chronic)    GERD (gastroesophageal reflux disease) (Chronic)    Essential hypertension (Chronic)    Nicotine dependence (Chronic)    History of spinal stenosis (Chronic)    Bilateral inguinal hernia (BIH) (Chronic)    COPD without exacerbation (HCC) (Chronic)    Chronic hepatitis C without hepatic coma (HCC) (Chronic)      Immunizations as of 10/3/2017     Name Date    Influenza Virus Vaccine 12/1/2014    Influenza, Nilam Cowan, 3 Years and older, IM 10/10/2016    Pneumococcal Polysaccharide (Catzriywh15) 12/1/2014    Tdap (Boostrix, Adacel) 1/1/2007      Preventive Care        Date Due    Pneumococcal Vaccines (two) for adults aged 19-64  years who are immunocompromised or whose spleen is missing or not working  (2 of 3 - PCV13) 12/1/2015    Yearly Flu Vaccine (1) 9/1/2017    Tetanus Combination Vaccine (2 - Td) 1/20/2018 (Originally 1/1/2017)    HIV screening is recommended for all people regardless of risk factors  aged 15-65 years at least once (lifetime) who have never been HIV tested. 3/20/2018 (Originally 11/9/1975)    Cholesterol Screening 1/30/2018    Colonoscopy 3/22/2018            [employer] Signup           Our records indicate that you have an active [employer] account. You can view your After Visit Summary by going to [email or web address] and logging in with your [employer] username and password.       If you don't have a [employer] username and password but a parent or guardian has access to your record, the parent or guardian should login with their own [employer] username and password and access your record to view the After Visit Summary. Additional Information  If you have questions, please contact the physician practice where you receive care. Remember, [employer] is NOT to be used for urgent needs. For medical emergencies, dial 911. For questions regarding your [employer] account call [phone]. If you have a clinical question, please call your doctor's office.

## 2017-10-05 ENCOUNTER — TELEPHONE (OUTPATIENT)
Dept: FAMILY MEDICINE CLINIC | Age: 57
End: 2017-10-05

## 2017-10-05 DIAGNOSIS — M54.41 CHRONIC BILATERAL LOW BACK PAIN WITH BILATERAL SCIATICA: Primary | ICD-10-CM

## 2017-10-05 DIAGNOSIS — R11.0 NAUSEA: ICD-10-CM

## 2017-10-05 DIAGNOSIS — M54.42 CHRONIC BILATERAL LOW BACK PAIN WITH BILATERAL SCIATICA: Primary | ICD-10-CM

## 2017-10-05 DIAGNOSIS — G89.29 CHRONIC BILATERAL LOW BACK PAIN WITH BILATERAL SCIATICA: Primary | ICD-10-CM

## 2017-10-05 RX ORDER — ONDANSETRON 4 MG/1
TABLET, FILM COATED ORAL
Qty: 60 TABLET | Refills: 0 | Status: SHIPPED | OUTPATIENT
Start: 2017-10-05 | End: 2017-12-18 | Stop reason: SDUPTHER

## 2017-10-05 RX ORDER — TIZANIDINE 4 MG/1
4 TABLET ORAL EVERY 8 HOURS PRN
Qty: 90 TABLET | Refills: 1 | Status: SHIPPED | OUTPATIENT
Start: 2017-10-05

## 2017-10-09 PROBLEM — B07.0 PLANTAR WART: Status: RESOLVED | Noted: 2017-05-31 | Resolved: 2017-10-09

## 2017-10-09 PROBLEM — R29.898 HAND WEAKNESS: Status: RESOLVED | Noted: 2017-01-20 | Resolved: 2017-10-09

## 2017-10-09 PROBLEM — K11.7 SIALORRHEA: Status: RESOLVED | Noted: 2017-08-10 | Resolved: 2017-10-09

## 2017-10-09 PROBLEM — R20.2 PARESTHESIA OF BOTH FEET: Status: RESOLVED | Noted: 2017-05-31 | Resolved: 2017-10-09

## 2017-10-10 ENCOUNTER — OFFICE VISIT (OUTPATIENT)
Dept: FAMILY MEDICINE CLINIC | Age: 57
End: 2017-10-10
Payer: MEDICARE

## 2017-10-10 VITALS
DIASTOLIC BLOOD PRESSURE: 70 MMHG | HEART RATE: 96 BPM | RESPIRATION RATE: 22 BRPM | WEIGHT: 161 LBS | TEMPERATURE: 98.3 F | HEIGHT: 65 IN | SYSTOLIC BLOOD PRESSURE: 128 MMHG | BODY MASS INDEX: 26.82 KG/M2

## 2017-10-10 DIAGNOSIS — M96.1 FAILED BACK SYNDROME: Chronic | ICD-10-CM

## 2017-10-10 DIAGNOSIS — G89.4 CHRONIC PAIN SYNDROME: ICD-10-CM

## 2017-10-10 DIAGNOSIS — J32.9 CHRONIC SINUSITIS, UNSPECIFIED LOCATION: ICD-10-CM

## 2017-10-10 DIAGNOSIS — M51.35 DDD (DEGENERATIVE DISC DISEASE), THORACOLUMBAR: Primary | Chronic | ICD-10-CM

## 2017-10-10 DIAGNOSIS — B18.2 CHRONIC HEPATITIS C WITHOUT HEPATIC COMA (HCC): Chronic | ICD-10-CM

## 2017-10-10 DIAGNOSIS — S46.212D RUPTURE OF DISTAL BICEPS TENDON, LEFT, SUBSEQUENT ENCOUNTER: ICD-10-CM

## 2017-10-10 DIAGNOSIS — F17.210 CIGARETTE NICOTINE DEPENDENCE WITHOUT COMPLICATION: Chronic | ICD-10-CM

## 2017-10-10 PROCEDURE — 99214 OFFICE O/P EST MOD 30 MIN: CPT | Performed by: FAMILY MEDICINE

## 2017-10-10 PROCEDURE — 4004F PT TOBACCO SCREEN RCVD TLK: CPT | Performed by: FAMILY MEDICINE

## 2017-10-10 PROCEDURE — G8417 CALC BMI ABV UP PARAM F/U: HCPCS | Performed by: FAMILY MEDICINE

## 2017-10-10 PROCEDURE — G8484 FLU IMMUNIZE NO ADMIN: HCPCS | Performed by: FAMILY MEDICINE

## 2017-10-10 PROCEDURE — 3017F COLORECTAL CA SCREEN DOC REV: CPT | Performed by: FAMILY MEDICINE

## 2017-10-10 PROCEDURE — G8427 DOCREV CUR MEDS BY ELIG CLIN: HCPCS | Performed by: FAMILY MEDICINE

## 2017-10-10 RX ORDER — OXYCODONE HYDROCHLORIDE 20 MG/1
10-20 TABLET ORAL EVERY 8 HOURS PRN
COMMUNITY
End: 2017-10-19 | Stop reason: SDUPTHER

## 2017-10-10 RX ORDER — MORPHINE SULFATE 30 MG/1
30 TABLET, FILM COATED, EXTENDED RELEASE ORAL 2 TIMES DAILY
Qty: 60 TABLET | Refills: 0 | Status: SHIPPED | OUTPATIENT
Start: 2017-10-10 | End: 2017-11-08 | Stop reason: SDUPTHER

## 2017-10-10 NOTE — PROGRESS NOTES
Chief Complaint   Patient presents with    Follow-up     issues below       History obtained from the patient. SUBJECTIVE:  Zofia Ng is a 60 y.o. male that presents today for       1.) DDD Lumbar spine PRIOR VISIT: seeing pain management, dr. John Valladares. Will be transitioning from oxycodone to percocet. Asking for an NSAID as well. On PPI. No hx of PUD, CKD or UGI bleed.       UPDATE PRIOR VISIT: Missed apt at [facility] d/t marital issues. Cannot be rescheduled, still having pain in low back with raidation down L leg. Oxycodone does help manage pain, but asking to be q6h prn instead of q8h prn. Pain is worsening. Has had 2 failed back surgeries and has not f/u with Dr. Edy Carr d/t his recent cancer etc. Denies bowl/bladdr problems or saddle anestheia. relafan ineffecrive, motrin 600mg tid works, however. Was in wheel chair, then walker, no has transitioned to a cane.       UPDATE PRIOR VISIT: Will be seeing Altagracia whitlock, he did one of his surgeons. Pain is stable. Altagracia will manage pain, per pt. Was started on baclofen as well, d/t not tolerating other muscle relaxers. However, insurance wouldn't cover, he restarted zanaflex and is doing better and not feeling \"mean\" any more.       UPDATE PRIOR VISIT: apt moved for altagracia to aug 18th. Pain stable. Tolerating meds. Picked up refill the other day. No side effects      UPDATE PRIOR VISIT: was not able to see ortho d/t bill owed. Pain meds keeping pain at bay. Due for refill in a few wks, but here today. declines cessation. Has tried wellbutrin, patches and chantix in the past.       UPDATE PRIOR VISIT: pain stable, meds working well. Due for refill 21 OCT of pain meds, asking for refill today with do not fill date so he doesn't have to come back. Denies bowl/bladder dysfuncton      UPDATE PRIOR VISIT: pain stable, due for refill of meds. No change in above sxs. No bowl/bladder dysfunction       UPDATE PRIOR VISIT: pain stable, meds working well. Due for refill. OXY IR to 20mg every 6 hours from 30mg every 4 hours prn. Was on morphine equiv was 270 and is now down to 120 on this regimen. So far it's holindg his pain as he get used to the new dose. No side effects. UPDATE TODAY: having sig inc pain with change to oxy IR 20 q6h prn pain. Hard to function. Seen by Dr. Avelina Bledsoe last wk, I was out of town, had elavil added and zanalfex changed to baclofen. That made him ill, so back to zanaflex. Is taking elavil, helps a little. Asking what else can be done. Miserable. 2.) Chronic sinus issue: HPI PRIOR VISIT:  Runny nose, cough, congestion, mild wheezing and SOB. No fever. Seen a few wks ago, given doxy, did not get better     UPDATE PRIOR VISIT: 50% better. Just a lot of drainage at this point. Finished levquin. Just resumed flonase.       UPDATE PRIOR VISIT: no better, a bit worse. Persistent drainage, sore throat and cough. Has done flonase, atrovent nasal as well as doxy and levaquin, no better.       UPDATE LAST VISIT: did 30 days of augmentin. No improvement in sxs. As done treatments as above. Had CT of sinus ordered, but he no-showed for it. Has not done f/u chest xray either.       UPDATE TODAY: after last time seen for this had CT scan ordered d/t persistence of sxs. Never got done. States sxs finally got better. Doesn't want to pursue. at this time. 3.) Hep c: was following with GI. Didn't tolerate treatment. Following with Kimberly Johnson. Has not f/u yet. Plans to soon. Denies RUQ pain or jaundice. 4.) Smoking: UPDATE TODAY: has patch and gum. Has cut down to maybe 3/4 PPD. No change from last visit. Age/Gender Health Maintenance    Lipid - ;  LDL 71; HDL 65; TG 83 (JAN 2017)    Lab Results   Component Value Date    CHOL 108 01/30/2017    CHOL 179 01/09/2016    CHOL 151 01/29/2015     Lab Results   Component Value Date    TRIG 101 01/30/2017    TRIG 93 01/09/2016    TRIG 142 01/29/2015     Lab Results   Component Value Date    HDL 40 01/30/2017    HDL 77 01/09/2016    HDL 66 01/29/2015     Lab Results   Component Value Date    LDLCALC 48 01/30/2017    LDLCALC 83 01/09/2016    LDLCALC 57 01/29/2015     No results found for: LABVLDL  No results found for: CHOLHDLRATIO      DM Screen - 96 (JAN 2016)    Lab Results   Component Value Date    GLUCOSE 82 01/30/2017    GLUCOSE 134 12/12/2011         Colon Cancer Screening - NEG 3/13, repeat 5 years d/t questional fam hx  Tetanus - UTD 2007  Influenza Vaccine - UTD OCT 2016, wants to wait until NOV 2017  Pneumonia Vaccine - UTD DEC 2014 PPV 23/wants to wait until next visit (Altamese Outlaw 2016)/MARCH 2016 as well/and July 2016/AND NOV 2016/MARCH 2017  Zostavax - UTD FALL 2014     PSA testing discussion - 0.3 MAY 2016  AAA Screening - age 70    Falls screening - N/A      Specialist List    Oncology: Jojo Trejo  GI: Magnus Maravilla: [facility]  ID: Edy Been      Current Outpatient Prescriptions   Medication Sig Dispense Refill    oxyCODONE (ROXICODONE) 20 MG immediate release tablet Take 10-20 mg by mouth every 8 hours as needed for Pain (for breakthrough pain) .  morphine (MS CONTIN) 30 MG extended release tablet Take 1 tablet by mouth 2 times daily . 60 tablet 0    ondansetron (ZOFRAN) 4 MG tablet TAKE 1 TABLET BY MOUTH DAILY AS NEEDED FOR NAUSEA AND VOMITING 60 tablet 0    tiZANidine (ZANAFLEX) 4 MG tablet Take 1 tablet by mouth every 8 hours as needed (back pain) 90 tablet 1    amitriptyline (ELAVIL) 25 MG tablet Take 1 tablet by mouth nightly 30 tablet 5    naproxen (NAPROSYN) 500 MG tablet TAKE 1 TABLET BY MOUTH TWICE DAILY AS NEEDED FOR PAIN 60 tablet 0    albuterol sulfate HFA (VENTOLIN HFA) 108 (90 Base) MCG/ACT inhaler Inhale 2 puffs into the lungs every 6 hours as needed for Wheezing To replace lost inhaler 1 Inhaler 0    PATADAY 0.2 % SOLN ophthalmic solution INSTILL 1 DROP IN BOTH EYES EVERY DAY.  3 Bottle 3    albuterol (PROVENTIL) (2.5 MG/3ML) 0.083% nebulizer solution USE 1 VIAL VIA topically 2 times daily for up to 4 weeks, then weekends only as needed. 60 g 0    HARVONI  MG per tablet Take 1 tablet by mouth daily       Tens Unit MISC by Does not apply route Use as needed for chronic back pain. Thanks! 1 each 0    fluticasone (FLONASE) 50 MCG/ACT nasal spray USE 1 SPRAY IN EACH NOSTRIL ONCE DAILY 3 Bottle 3    acetaminophen 650 MG TABS Take 650 mg by mouth every 4 hours as needed. 120 tablet 3    Multiple Vitamin (MULTIVITAMINS PO) Take 1 tablet by mouth daily. No current facility-administered medications for this visit. Facility-Administered Medications Ordered in Other Visits   Medication Dose Route Frequency Provider Last Rate Last Dose    Absorbable Collagen Hemostat MISC 0.5 g  0.5 g Intra-Lesional Once Carolyn Pierre MD         Orders Placed This Encounter   Medications    morphine (MS CONTIN) 30 MG extended release tablet     Sig: Take 1 tablet by mouth 2 times daily . Dispense:  60 tablet     Refill:  0         All medications reviewed and reconciled, including OTC and herbal medications. Updated list given to patient.        Patient Active Problem List    Diagnosis Date Noted    Tear of right rotator cuff 09/25/2017    Chronic sinusitis 07/25/2017    Chronic venous insufficiency     Allergic conjunctivitis 06/20/2017    Microscopic hematuria 05/31/2017     Following with urology now      Rupture of distal biceps tendon 05/08/2017    Osteopenia     History of lymphoma     Chronic pain of right knee 01/20/2017    Bilateral edema of lower extremity 06/23/2016    Enlarged thoracic aorta ([facility] [address]      Upper limits of normal size on CT chest with contrast 2016      Ventral hernia without obstruction or gangrene     Pulmonary nodules 09/15/2015     Being monitored by oncology      Cervical radiculopathy 09/15/2015    CLIFF (generalized anxiety disorder) 08/07/2015    Failed back syndrome 06/08/2015    Allergic rhinitis 06/08/2015    Chronic pain Apnea  Gastrointestinal:  Nausea, Vomiting, Diarrhea, Constipation, Heartburn, Blood in stool  Genitourinary:  Difficulty or painful urination, Flank pain, Change in frequency, Urgency  Skin:  Color change, Rash, Itching, Wound  Musculoskeletal:  Joint pain, Back pain, Gait problems, Joint swelling, Myalgias  Neurological:  Dizziness, Headaches, Presyncope, Numbness, Seizures, Tremors  Endocrine:  Heat Intolerance, Cold Intolerance, Polydipsia, Polyphagia, Polyuria      PHYSICAL EXAM:  Vitals:    10/10/17 1506   BP: 128/70   Pulse: 96   Resp: 22   Temp: 98.3 °F (36.8 °C)   TempSrc: Oral   Weight: 161 lb (73 kg)   Height: 5' 5.16\" (1.655 m)     Body mass index is 26.66 kg/m². Pain Score:   7 (low back pain)    VS Reviewed  General Appearance: A&O x 3, No acute distress,well developed and well- nourished  Head: normocephalic and atraumatic  Eyes: pupils equal, round, and reactive to light, extraocular eye movements intact, conjunctivae bilat with scan redness and drainage, no corneal involvement. Eye lids without erythema  ENT: bilateral TM normal without fluid or infection, neck without nodes, throat normal without erythema or exudate, sinuses nontender, post nasal drip noted, nasal mucosa congested and Oropharynx clear, without erythema, exudate, or thrush. Neck: supple and non-tender without mass, no thyromegaly or thyroid nodules, no cervical lymphadenopathy  Pulmonary/Chest: distant with good air movement bilaterally- no wheezing throughout, no rales or rhonchi, no respiratory distress or retractions  Cardiovascular: Distant, S1 and S2 auscultated w/ RRR. No murmurs, rubs, clicks, or gallops, distal pulses intact. Abdomen: soft, non-tender, non-distended, bowl sounds physiologic,  no rebound or guarding, no masses noted or obvious hernia or defect noted. Liver and spleen without enlargement. Extremities: no cyanosis, clubbing  of the lower extremities. +1 bilat ankle edema. +2 PT/DP bilaterally.  Neg homans

## 2017-10-10 NOTE — PROGRESS NOTES
Have you changed or started any medications since your last visit including any over-the-counter medicines, vitamins, or herbal medicines? no   Are you having any side effects from any of your medications? -  no  Have you stopped taking any of your medications? Is so, why? -  no    Have you seen any other physician or provider since your last visit? No  Have you had any other diagnostic tests since your last visit? No  Have you been seen in the emergency room and/or had an admission to a hospital since we last saw you? No  Have you had your routine dental cleaning in the past 6 months? {YES/NO DEFAULT:31414}    Have you activated your [employer] account? If not, what are your barriers? Yes      Patient Care Team:  Antony Deleon DO as PCP - General    Medical History Review  Past Medical, Family, and Social History     Defer to provider.

## 2017-10-11 ENCOUNTER — TELEPHONE (OUTPATIENT)
Dept: FAMILY MEDICINE CLINIC | Age: 57
End: 2017-10-11

## 2017-10-16 ENCOUNTER — TELEPHONE (OUTPATIENT)
Dept: FAMILY MEDICINE CLINIC | Age: 57
End: 2017-10-16

## 2017-10-17 NOTE — TELEPHONE ENCOUNTER
Can keep 4 times daily for now  However, earliest I can fill would be Thursday, no sooner.      Future Appointments  Date Time Provider Sara Almonte   11/10/2017 9:00 AM DO [facility] [place]   2/22/2018 10:30 AM Lauri Rodriguez MD Oncology [facility] [place]

## 2017-10-19 DIAGNOSIS — S46.212S RUPTURE OF LEFT DISTAL BICEPS TENDON, SEQUELA: ICD-10-CM

## 2017-10-19 DIAGNOSIS — M51.35 DDD (DEGENERATIVE DISC DISEASE), THORACOLUMBAR: Chronic | ICD-10-CM

## 2017-10-19 DIAGNOSIS — Z87.39 HISTORY OF SPINAL STENOSIS: Primary | Chronic | ICD-10-CM

## 2017-10-19 DIAGNOSIS — M96.1 FAILED BACK SYNDROME: Chronic | ICD-10-CM

## 2017-10-19 DIAGNOSIS — G89.4 CHRONIC PAIN SYNDROME: Chronic | ICD-10-CM

## 2017-10-19 DIAGNOSIS — M75.101 TEAR OF RIGHT ROTATOR CUFF, UNSPECIFIED TEAR EXTENT: ICD-10-CM

## 2017-10-19 RX ORDER — OXYCODONE HYDROCHLORIDE 20 MG/1
20 TABLET ORAL EVERY 6 HOURS PRN
Qty: 120 TABLET | Refills: 0 | Status: SHIPPED | OUTPATIENT
Start: 2017-10-19 | End: 2017-11-02 | Stop reason: DRUGHIGH

## 2017-10-19 NOTE — TELEPHONE ENCOUNTER
ASSESSMENT & PLAN  1. History of spinal stenosis    - oxyCODONE (ROXICODONE) 20 MG immediate release tablet; Take 1 tablet by mouth every 6 hours as needed for Pain (for breakthrough pain) . Dispense: 120 tablet; Refill: 0    2. DDD (degenerative disc disease), thoracolumbar    - oxyCODONE (ROXICODONE) 20 MG immediate release tablet; Take 1 tablet by mouth every 6 hours as needed for Pain (for breakthrough pain) . Dispense: 120 tablet; Refill: 0    3. Chronic pain syndrome    - oxyCODONE (ROXICODONE) 20 MG immediate release tablet; Take 1 tablet by mouth every 6 hours as needed for Pain (for breakthrough pain) . Dispense: 120 tablet; Refill: 0    4. Failed back syndrome    - oxyCODONE (ROXICODONE) 20 MG immediate release tablet; Take 1 tablet by mouth every 6 hours as needed for Pain (for breakthrough pain) . Dispense: 120 tablet; Refill: 0    5. Rupture of left distal biceps tendon, sequela    - oxyCODONE (ROXICODONE) 20 MG immediate release tablet; Take 1 tablet by mouth every 6 hours as needed for Pain (for breakthrough pain) . Dispense: 120 tablet; Refill: 0    6. Tear of right rotator cuff, unspecified tear extent    - oxyCODONE (ROXICODONE) 20 MG immediate release tablet; Take 1 tablet by mouth every 6 hours as needed for Pain (for breakthrough pain) . Dispense: 120 tablet; Refill: 0      OAARS reviewed and appropriate. Controlled Substances Monitoring: Attestation: The Prescription Monitoring Report for this patient was reviewed today. Juan Otto DO)  Documentation: No signs of potential drug abuse or diversion identified., Existing medication contract.  Juan Otto DO)      Future Appointments  Date Time Provider Sara Almonte   11/10/2017 9:00 AM Juan Otto DO [facility] [place]   2/22/2018 10:30 AM Gisselle Garcia MD Oncology [facility] [place]

## 2017-10-22 DIAGNOSIS — M19.90 ARTHRITIS: Chronic | ICD-10-CM

## 2017-10-23 RX ORDER — NAPROXEN 500 MG/1
TABLET ORAL
Qty: 60 TABLET | Refills: 0 | Status: SHIPPED | OUTPATIENT
Start: 2017-10-23 | End: 2017-11-30 | Stop reason: SDUPTHER

## 2017-10-31 ENCOUNTER — TELEPHONE (OUTPATIENT)
Dept: FAMILY MEDICINE CLINIC | Age: 57
End: 2017-10-31

## 2017-10-31 DIAGNOSIS — J44.9 CHRONIC OBSTRUCTIVE PULMONARY DISEASE, UNSPECIFIED COPD TYPE (HCC): Primary | Chronic | ICD-10-CM

## 2017-10-31 RX ORDER — BUDESONIDE AND FORMOTEROL FUMARATE DIHYDRATE 160; 4.5 UG/1; UG/1
AEROSOL RESPIRATORY (INHALATION)
Qty: 30.6 G | Refills: 3 | Status: SHIPPED | OUTPATIENT
Start: 2017-10-31 | End: 2018-08-25 | Stop reason: SDUPTHER

## 2017-10-31 NOTE — TELEPHONE ENCOUNTER
At this point my plan would be to con't what he is on and escalate as needed. I'd recommend apt in clinic Thursday or Friday if it's going to be an issue.

## 2017-10-31 NOTE — TELEPHONE ENCOUNTER
Pt called in stating that he has surgery scheduled with Dr oJhan Joaquin tomorrow at [facility]. Pt states that Dr Terry Martinez handles his pain medications and is wondering if he has a regiment in place for after surgery. Please advise.

## 2017-11-02 ENCOUNTER — OFFICE VISIT (OUTPATIENT)
Dept: FAMILY MEDICINE CLINIC | Age: 57
End: 2017-11-02
Payer: MEDICARE

## 2017-11-02 VITALS
SYSTOLIC BLOOD PRESSURE: 120 MMHG | DIASTOLIC BLOOD PRESSURE: 68 MMHG | WEIGHT: 161.4 LBS | HEART RATE: 96 BPM | RESPIRATION RATE: 16 BRPM | BODY MASS INDEX: 26.73 KG/M2 | TEMPERATURE: 98.7 F

## 2017-11-02 DIAGNOSIS — M25.512 ACUTE PAIN OF LEFT SHOULDER: Primary | ICD-10-CM

## 2017-11-02 DIAGNOSIS — Z98.890 HISTORY OF SHOULDER SURGERY: ICD-10-CM

## 2017-11-02 DIAGNOSIS — H10.13 ALLERGIC CONJUNCTIVITIS, BILATERAL: ICD-10-CM

## 2017-11-02 DIAGNOSIS — S46.212S BICEPS TENDON RUPTURE, LEFT, SEQUELA: ICD-10-CM

## 2017-11-02 DIAGNOSIS — F17.210 CIGARETTE NICOTINE DEPENDENCE WITHOUT COMPLICATION: Chronic | ICD-10-CM

## 2017-11-02 PROCEDURE — 3017F COLORECTAL CA SCREEN DOC REV: CPT | Performed by: FAMILY MEDICINE

## 2017-11-02 PROCEDURE — 99214 OFFICE O/P EST MOD 30 MIN: CPT | Performed by: FAMILY MEDICINE

## 2017-11-02 PROCEDURE — 4004F PT TOBACCO SCREEN RCVD TLK: CPT | Performed by: FAMILY MEDICINE

## 2017-11-02 PROCEDURE — G8417 CALC BMI ABV UP PARAM F/U: HCPCS | Performed by: FAMILY MEDICINE

## 2017-11-02 PROCEDURE — G8484 FLU IMMUNIZE NO ADMIN: HCPCS | Performed by: FAMILY MEDICINE

## 2017-11-02 PROCEDURE — G8427 DOCREV CUR MEDS BY ELIG CLIN: HCPCS | Performed by: FAMILY MEDICINE

## 2017-11-02 RX ORDER — MORPHINE SULFATE 30 MG/1
30 TABLET, FILM COATED, EXTENDED RELEASE ORAL 2 TIMES DAILY
Qty: 60 TABLET | Refills: 0 | Status: CANCELLED | OUTPATIENT
Start: 2017-11-02 | End: 2017-12-02

## 2017-11-02 RX ORDER — OXYCODONE HYDROCHLORIDE 20 MG/1
20 TABLET ORAL EVERY 4 HOURS PRN
COMMUNITY
End: 2017-11-06 | Stop reason: SDUPTHER

## 2017-11-02 NOTE — PROGRESS NOTES
21/wants to wait until next visit (Travis Valdovinos 2016)/MARCH 2016 as well/and July 2016/AND NOV 2016/MARCH 2017  Zostavax - UTD FALL 2014     PSA testing discussion - 0.3 MAY 2016  AAA Screening - age 70    Falls screening - N/A      Specialist List    Oncology: Colt Walhs  GI: Heladio [place]: [facility]  ID: Lilliam Cummings      Current Outpatient Prescriptions   Medication Sig Dispense Refill    Olopatadine HCl (PAZEO) 0.7 % SOLN Apply 1 drop to eye daily 3 Bottle 3    oxyCODONE (ROXICODONE) 20 MG immediate release tablet Take 20 mg by mouth every 4 hours as needed for Pain .  budesonide-formoterol (SYMBICORT) 160-4.5 MCG/ACT AERO INHALE 2 PUFFS INTO THE LUNGS TWICE DAILY 30.6 g 3    naproxen (NAPROSYN) 500 MG tablet TAKE 1 TABLET BY MOUTH TWICE DAILY AS NEEDED FOR PAIN 60 tablet 0    morphine (MS CONTIN) 30 MG extended release tablet Take 1 tablet by mouth 2 times daily . 60 tablet 0    ondansetron (ZOFRAN) 4 MG tablet TAKE 1 TABLET BY MOUTH DAILY AS NEEDED FOR NAUSEA AND VOMITING 60 tablet 0    tiZANidine (ZANAFLEX) 4 MG tablet Take 1 tablet by mouth every 8 hours as needed (back pain) 90 tablet 1    albuterol (PROVENTIL) (2.5 MG/3ML) 0.083% nebulizer solution USE 1 VIAL VIA NEBULIZER EVERY 4 HOURS AS NEEDED FOR WHEEZING OR SHORTNESS OF BREATH 360 mL 3    lidocaine (XYLOCAINE) 2 % jelly Apply topically as needed.  5 Tube 6    furosemide (LASIX) 20 MG tablet TAKE 1 TABLET BY MOUTH EVERY DAY AS NEEDED FOR LEG SWELLING 90 tablet 3    guaiFENesin (MUCINEX) 600 MG extended release tablet Take 1 tablet by mouth 2 times daily as needed for Congestion 60 tablet 1    metoprolol tartrate (LOPRESSOR) 25 MG tablet TAKE 1 TABLET BY MOUTH TWICE DAILY 180 tablet 3    atorvastatin (LIPITOR) 10 MG tablet TAKE 1 TABLET BY MOUTH ONCE DAILY 90 tablet 3    butalbital-acetaminophen-caffeine (FIORICET, ESGIC) -40 MG per tablet TAKE 1 TABLET BY MOUTH EVERY 6 HOURS AS NEEDED FOR HEADACHE 60 tablet 1    polyethylene glycol (GLYCOLAX) powder daily  4    calcium-cholecalciferol 500-200 MG-UNIT per tablet Take 1 tablet by mouth 2 times daily 180 tablet 3    albuterol sulfate HFA (VENTOLIN HFA) 108 (90 BASE) MCG/ACT inhaler INHALE 2 PUFFS INTO THE LUNGS EVERY 6 HOURS AS NEEDED FOR WHEEZING 3 Inhaler 5    busPIRone (BUSPAR) 15 MG tablet TAKE 1 TABLET BY MOUTH EVERY 6 HOURS 360 tablet 3    nystatin (MYCOSTATIN) 776069 UNIT/ML suspension Take 5 mLs by mouth 4 times daily 400 mL 5    aspirin 81 MG EC tablet Take 1 tablet by mouth daily 30 tablet 3    omeprazole (PRILOSEC) 20 MG delayed release capsule TAKE 1 CAPSULE BY MOUTH DAILY 90 capsule 3    Misc. Devices MISC TENS UNIT ELECTRODE PADS    Use daily as needed for back pain. 4 each 3    Misc. Devices (QUAD CANE) MISC Adjustable narrow base quad cane 1 each 0    Nutritional Supplements (ENSURE) LIQD Drink 4 cans per day 120 Can 5    triamcinolone (KENALOG) 0.1 % cream Apply topically 2 times daily for up to 4 weeks, then weekends only as needed. 60 g 0    fluticasone (FLONASE) 50 MCG/ACT nasal spray USE 1 SPRAY IN EACH NOSTRIL ONCE DAILY 3 Bottle 3    acetaminophen 650 MG TABS Take 650 mg by mouth every 4 hours as needed. 120 tablet 3    Multiple Vitamin (MULTIVITAMINS PO) Take 1 tablet by mouth daily.  HARVONI  MG per tablet Take 1 tablet by mouth daily        No current facility-administered medications for this visit. Facility-Administered Medications Ordered in Other Visits   Medication Dose Route Frequency Provider Last Rate Last Dose    Absorbable Collagen Hemostat MISC 0.5 g  0.5 g Intra-Lesional Once Brad Fitch MD         Orders Placed This Encounter   Medications    Olopatadine HCl (PAZEO) 0.7 % SOLN     Sig: Apply 1 drop to eye daily     Dispense:  3 Bottle     Refill:  3         All medications reviewed and reconciled, including OTC and herbal medications. Updated list given to patient.        Patient Active Problem List    Diagnosis Date Noted  Biceps tendon rupture, left, sequela 11/02/2017    Tear of right rotator cuff 09/25/2017    Chronic sinusitis 07/25/2017    Chronic venous insufficiency     Allergic conjunctivitis 06/20/2017    Microscopic hematuria 05/31/2017     Following with urology now      Rupture of distal biceps tendon 05/08/2017    Osteopenia     History of lymphoma     Chronic pain of right knee 01/20/2017    Bilateral edema of lower extremity 06/23/2016    Enlarged thoracic aorta (HCC)      Upper limits of normal size on CT chest with contrast 2016      Ventral hernia without obstruction or gangrene     Pulmonary nodules 09/15/2015     Being monitored by oncology      Cervical radiculopathy 09/15/2015    CLIFF (generalized anxiety disorder) 08/07/2015    Failed back syndrome 06/08/2015    Allergic rhinitis 06/08/2015    Chronic pain syndrome 04/05/2015    Insomnia 93/44/0172    Complication of chemotherapy 02/09/2015    History of chemotherapy 02/09/2015    Diffuse large B cell lymphoma ([address] follows with Dr Sujit Francisco 12/12/2014    Chronic combined systolic and diastolic heart failure ([address] 12/12/2014     EF 45-50% FEB 20152015/JUNE 2016      DDD (degenerative disc disease), thoracolumbar 12/01/2014    Arthritis     Sleep apnea      no CPAP      GERD (gastroesophageal reflux disease)     Essential hypertension 10/20/2014    Nicotine dependence 07/16/2014    History of spinal stenosis 07/16/2014    Bilateral inguinal hernia (BIH) 09/26/2013    COPD (chronic obstructive pulmonary disease) ([address] 09/26/2013    Chronic hepatitis C without hepatic coma ([address] 09/26/2013          Past Medical History:   Diagnosis Date    Allergic conjunctivitis 6/20/2017    Allergic rhinitis 6/8/2015    Arthritis     Cervical radiculopathy 9/15/2015    Chronic combined systolic and diastolic heart failure ([address] 12/12/2014    EF 45-50% FEB 20152015/JUNE 2016    Chronic hepatitis C without hepatic coma ([address] 9/26/2013    Chronic pain syndrome 4/5/2015    Chronic venous insufficiency     COPD (chronic obstructive pulmonary disease) (HCC)     DDD (degenerative disc disease), thoracolumbar 12/1/2014    Diffuse large B cell lymphoma ([facility] follows with Dr Kristan Marquez 12/12/2014    Enlarged thoracic aorta ([facility]     Upper limits of normal size on CT chest with contrast 2016    Essential hypertension     Failed back syndrome 6/8/2015    CLIFF (generalized anxiety disorder) 8/7/2015    GERD (gastroesophageal reflux disease)     History of lymphoma     Insomnia 2/20/2015    Microscopic hematuria 5/31/2017    Nicotine dependence 7/16/2014    Osteopenia     Personal history of colonic polyps 10/24/2014    Pulmonary nodules 9/15/2015    Being monitored by oncology     Sleep apnea     no CPAP         Past Surgical History:   Procedure Laterality Date    APPENDECTOMY  age 13    Fordlily s BACK SURGERY  3/31/2014    Lumbar Laminectomy L3-5    COLONOSCOPY  2013    Dr. Hao Lock 2014    Billiary Stent    UMBILICAL HERNIA REPAIR  age 13    [facility] VENTRAL HERNIA REPAIR  10/5/2015    w mesh         Allergies   Allergen Reactions    Chantix [Varenicline]      Makes him feel terrible    Neurontin [Gabapentin] Other (See Comments)     HALLUCINATIONS    Tramadol Nausea Only and Nausea And Vomiting         Social History     Social History    Marital status:      Spouse name: Stanton Toney Number of children: 2    Years of education: 6     Occupational History     [employer]     Social History Main Topics    Smoking status: Current Every Day Smoker     Packs/day: 1.00     Years: 30.00     Types: Cigarettes    Smokeless tobacco: Never Used      Comment: smoking cessation information given at past appt    Alcohol use No    Drug use: No    Sexual activity: Yes     Partners: Female     Other Topics Concern    Not on file     Social History Narrative    No narrative on file         Family without erythema or exudate, sinuses nontender, post nasal drip noted, nasal mucosa congested and Oropharynx clear, without erythema, exudate, or thrush. Neck: supple and non-tender without mass, no thyromegaly or thyroid nodules, no cervical lymphadenopathy  Pulmonary/Chest: distant with good air movement bilaterally- no wheezing throughout, no rales or rhonchi, no respiratory distress or retractions  Cardiovascular: Distant, S1 and S2 auscultated w/ RRR. No murmurs, rubs, clicks, or gallops, distal pulses intact. Abdomen: soft, non-tender, non-distended, bowl sounds physiologic,  no rebound or guarding, no masses noted or obvious hernia or defect noted. Liver and spleen without enlargement. Extremities: no cyanosis, clubbing  of the lower extremities. +1 bilat ankle edema. +2 PT/DP bilaterally. Neg homans bilat. No calf edema or TTP. Musculoskeletal: No joint swelling or gross deformity   Skin: warm and dry, no rash or erythema. L upper ARM: in sling. Wrapped. Cap refill < 2 sec on fingers      ASSESSMENT & PLAN  1. Acute pain of left shoulder    I'm ok with managing his pain in the post-op period    My plan:  con't MS contin 30mg bid, call next wk for refill  For the next 30 days will have him use oxuy IR 20mg q4h prn pain, may call next wk for refill    Long-term plan will be to maximize ms contin and con't to de-escalate oxy IR use. Goal is 5 to 10mg tid prn. F/u 4 wks    OAARS reviewed and appropriate. Controlled Substances Monitoring:     Attestation: The Prescription Monitoring Report for this patient was reviewed today. Addi Trevizo, )  Documentation: Possible medication side effects, risk of tolerance and/or dependence, and alternative treatments discussed., No signs of potential drug abuse or diversion identified. Addi Trevizo DO)  Chronic Pain: Treatment objectives documented - patient is progressing appropriately. Addi Trevizo, )    2.  Biceps tendon rupture, left, sequela      3. History of shoulder surgery      4. Allergic conjunctivitis, bilateral    D/t pataday and trial pazeo    - Olopatadine HCl (PAZEO) 0.7 % SOLN; Apply 1 drop to eye daily  Dispense: 3 Bottle; Refill: 3    5. Cigarette nicotine dependence without complication    In precontemplation stage and not ready to quit. Declines cessation, aware of risks of continued smoking as well as resources available to help quit. These include tobacco cessation classes as well as 1-800-QUIT NOW. No barriers other than lack of desire. 3+ min spent counseling. DISPOSITION    Return in about 5 weeks (around 12/5/2017) for f/u pain issues, sooner as needed. Lance Freeman released without restrictions. Future Appointments  Date Time Provider Sara Almonte   12/7/2017 1:20 PM Telma Knapp DO [email or web address]   2/22/2018 10:30 AM Racheal Zapata MD Oncology [facility] - [address] [place] [address] received counseling on the following healthy behaviors: nutrition, exercise, medication adherence and tobacco cessation    Patient given educational materials on: See Attached    I have instructed Lance Freeman to complete a self tracking handout on Smoking and instructed them to bring it with them to his next appointment. Barriers to learning and self management: none    Discussed use, benefit, and side effects of prescribed medications. Barriers to medication compliance addressed. All patient questions answered. Pt voiced understanding.        Electronically signed by Telma Knapp DO on 11/2/2017 at 5:11 PM

## 2017-11-02 NOTE — PATIENT INSTRUCTIONS
not strain or hurt the painful joint. When should you call for help? Call your doctor now or seek immediate medical care if:  · You have signs of infection, such as:  ¨ Increased pain, swelling, warmth, and redness. ¨ Red streaks leading from the joint. ¨ A fever. Watch closely for changes in your health, and be sure to contact your doctor if:  · Your movement or symptoms are not getting better after 1 to 2 weeks of home treatment. Where can you learn more? Go to [email or web address]://Change [email or web address].[employer]. org and sign in to your [employer] account. Enter P205 in the [place] box to learn more about \"Joint Pain: Care Instructions. \"     If you do not have an account, please click on the \"Sign Up Now\" link. Current as of: March 21, 2017  Content Version: 11.3  © 3767-9419 [employer]. Care instructions adapted under license by [facility] ([facility]). If you have questions about a medical condition or this instruction, always ask your healthcare professional. [place] [address] any warranty or liability for your use of this information. Patient Education        Stopping Smoking: Care Instructions  Your Care Instructions  Cigarette smokers crave the nicotine in cigarettes. Giving it up is much harder than simply changing a habit. Your body has to stop craving the nicotine. It is hard to quit, but you can do it. There are many tools that people use to quit smoking. You may find that combining tools works best for you. There are several steps to quitting. First you get ready to quit. Then you get support to help you. After that, you learn new skills and behaviors to become a nonsmoker. For many people, a necessary step is getting and using medicine. Your doctor will help you set up the plan that best meets your needs. You may want to attend a smoking cessation program to help you quit smoking. When you choose a program, look for one that has proven success.  Ask your doctor for

## 2017-11-06 ENCOUNTER — TELEPHONE (OUTPATIENT)
Dept: FAMILY MEDICINE CLINIC | Age: 57
End: 2017-11-06

## 2017-11-06 DIAGNOSIS — M75.101 TEAR OF RIGHT ROTATOR CUFF, UNSPECIFIED TEAR EXTENT: ICD-10-CM

## 2017-11-06 DIAGNOSIS — S46.212S BICEPS TENDON RUPTURE, LEFT, SEQUELA: ICD-10-CM

## 2017-11-06 DIAGNOSIS — Z98.890 S/P SHOULDER SURGERY: Primary | ICD-10-CM

## 2017-11-06 RX ORDER — OXYCODONE HYDROCHLORIDE 20 MG/1
20 TABLET ORAL EVERY 4 HOURS PRN
Qty: 86 TABLET | Refills: 0 | Status: SHIPPED | OUTPATIENT
Start: 2017-11-06 | End: 2017-11-17 | Stop reason: SDUPTHER

## 2017-11-06 RX ORDER — OXYCODONE HYDROCHLORIDE 20 MG/1
20 TABLET ORAL EVERY 4 HOURS PRN
Qty: 180 TABLET | Refills: 0 | Status: SHIPPED | OUTPATIENT
Start: 2017-11-06 | End: 2017-11-06 | Stop reason: SDUPTHER

## 2017-11-06 NOTE — TELEPHONE ENCOUNTER
[id number] Yaneli Chavez, please let pt know  We're limited in what we can do at this point  We may need to inc the MS contin  But this supply should last at least 2 wks (preferably longer)    Plan for f/u apt in 2 wks; please schedule    ASSESSMENT & PLAN  1. S/P shoulder surgery    - oxyCODONE (ROXICODONE) 20 MG immediate release tablet; Take 1 tablet by mouth every 4 hours as needed for Pain . Dispense: 86 tablet; Refill: 0    2. Tear of right rotator cuff, unspecified tear extent    - oxyCODONE (ROXICODONE) 20 MG immediate release tablet; Take 1 tablet by mouth every 4 hours as needed for Pain . Dispense: 86 tablet; Refill: 0    3. Biceps tendon rupture, left, sequela    - oxyCODONE (ROXICODONE) 20 MG immediate release tablet; Take 1 tablet by mouth every 4 hours as needed for Pain . Dispense: 86 tablet; Refill: 0      OAARS reviewed and appropriate. Controlled Substances Monitoring: Attestation: The Prescription Monitoring Report for this patient was reviewed today. Jeni Beard DO)  Documentation: Possible medication side effects, risk of tolerance and/or dependence, and alternative treatments discussed., No signs of potential drug abuse or diversion identified.  Jeni Beard DO)      Future Appointments  Date Time Provider Sara Almonte   12/7/2017 1:20 PM [place] [facility] [place]   2/22/2018 10:30 AM Jacoby Haywood MD Oncology [facility]         Future Appointments  Date Time Provider Sara Almonte   12/7/2017 1:20 PM [place] [facility] [place]   2/22/2018 10:30 AM Jacoby Haywood MD Oncology [facility]

## 2017-11-06 NOTE — TELEPHONE ENCOUNTER
Pt called stating his pharmacy was not able to release the oxyCODONE to him but he didn't understand why & he wanted to know if our ofc could find out. The pt's pharmacy was contacted & the pharmacy stated they're receiving a \"plan limit exceeded\" code from the pt's ins. The pharmacist stated he doesn't know if because it's a different dose or due to it being to early or what the reasoning is but states the ins will not go through. The pt was notified & he is asking if there is another med that can be sent in for him until the oxyCODONE can be filled as he is \"really in quite a bit of pain. \"   Please advise.

## 2017-11-08 DIAGNOSIS — S46.212D RUPTURE OF DISTAL BICEPS TENDON, LEFT, SUBSEQUENT ENCOUNTER: ICD-10-CM

## 2017-11-08 DIAGNOSIS — M51.35 DDD (DEGENERATIVE DISC DISEASE), THORACOLUMBAR: Chronic | ICD-10-CM

## 2017-11-08 DIAGNOSIS — G89.4 CHRONIC PAIN SYNDROME: ICD-10-CM

## 2017-11-08 DIAGNOSIS — M96.1 FAILED BACK SYNDROME: Chronic | ICD-10-CM

## 2017-11-08 RX ORDER — MORPHINE SULFATE 30 MG/1
30 TABLET, FILM COATED, EXTENDED RELEASE ORAL 2 TIMES DAILY
Qty: 60 TABLET | Refills: 0 | Status: SHIPPED | OUTPATIENT
Start: 2017-11-08 | End: 2017-12-07 | Stop reason: DRUGHIGH

## 2017-11-08 NOTE — TELEPHONE ENCOUNTER
ASSESSMENT & PLAN  1. DDD (degenerative disc disease), thoracolumbar    - morphine (MS CONTIN) 30 MG extended release tablet; Take 1 tablet by mouth 2 times daily . Dispense: 60 tablet; Refill: 0    2. Failed back syndrome    - morphine (MS CONTIN) 30 MG extended release tablet; Take 1 tablet by mouth 2 times daily . Dispense: 60 tablet; Refill: 0    3. Rupture of distal biceps tendon, left, subsequent encounter    - morphine (MS CONTIN) 30 MG extended release tablet; Take 1 tablet by mouth 2 times daily . Dispense: 60 tablet; Refill: 0    4. Chronic pain syndrome    - morphine (MS CONTIN) 30 MG extended release tablet; Take 1 tablet by mouth 2 times daily . Dispense: 60 tablet; Refill: 0      OAARS reviewed and appropriate. Controlled Substances Monitoring: Attestation: The Prescription Monitoring Report for this patient was reviewed today. Andrea Weber DO)  Documentation: Possible medication side effects, risk of tolerance and/or dependence, and alternative treatments discussed. , Existing medication contract. Andrea Weber DO)  Chronic Pain: Reviewed the patient's functional status and documentation, including the 4A's of chronic pain treatment. Andrea Weber DO)      Future Appointments  Date Time Provider Sara Almonte   11/17/2017 12:00 PM Andrea Weber DO [facility] [address]   12/7/2017 1:20 PM Andrea Weber DO [facility] [address] [place] [address] Ave   2/22/2018 10:30 AM Gilma Sharma MD Oncology [facility]     Patient's Back pain is chronic and has been present for longer than 90 days. Non opioid medications have been tried and have not sufficiently improved his pain or are contraindicated.

## 2017-11-13 ENCOUNTER — HOSPITAL ENCOUNTER (EMERGENCY)
Age: 57
Discharge: HOME | End: 2017-11-13
Payer: MEDICARE

## 2017-11-13 ENCOUNTER — TELEPHONE (OUTPATIENT)
Dept: FAMILY MEDICINE CLINIC | Age: 57
End: 2017-11-13

## 2017-11-13 ENCOUNTER — APPOINTMENT (OUTPATIENT)
Dept: INTERVENTIONAL RADIOLOGY/VASCULAR | Age: 57
End: 2017-11-13
Payer: MEDICARE

## 2017-11-13 VITALS
TEMPERATURE: 98.9 F | HEIGHT: 65 IN | WEIGHT: 162 LBS | RESPIRATION RATE: 14 BRPM | HEART RATE: 97 BPM | DIASTOLIC BLOOD PRESSURE: 78 MMHG | OXYGEN SATURATION: 96 % | SYSTOLIC BLOOD PRESSURE: 126 MMHG | BODY MASS INDEX: 26.99 KG/M2

## 2017-11-13 DIAGNOSIS — R60.0 PEDAL EDEMA: Primary | ICD-10-CM

## 2017-11-13 DIAGNOSIS — R60.0 BILATERAL LEG EDEMA: ICD-10-CM

## 2017-11-13 LAB
ALBUMIN SERPL-MCNC: 3.7 G/DL (ref 3.5–5.1)
ALP BLD-CCNC: 191 U/L (ref 38–126)
ALT SERPL-CCNC: 34 U/L (ref 11–66)
ANION GAP SERPL CALCULATED.3IONS-SCNC: 11 MEQ/L (ref 8–16)
ANISOCYTOSIS: ABNORMAL
AST SERPL-CCNC: 50 U/L (ref 5–40)
BASOPHILS # BLD: 1 %
BASOPHILS ABSOLUTE: 0.1 THOU/MM3 (ref 0–0.1)
BILIRUB SERPL-MCNC: 0.6 MG/DL (ref 0.3–1.2)
BUN BLDV-MCNC: 15 MG/DL (ref 7–22)
CALCIUM SERPL-MCNC: 8.9 MG/DL (ref 8.5–10.5)
CHLORIDE BLD-SCNC: 100 MEQ/L (ref 98–111)
CO2: 27 MEQ/L (ref 23–33)
CREAT SERPL-MCNC: 0.9 MG/DL (ref 0.4–1.2)
EOSINOPHIL # BLD: 4 %
EOSINOPHILS ABSOLUTE: 0.2 THOU/MM3 (ref 0–0.4)
GFR SERPL CREATININE-BSD FRML MDRD: 86 ML/MIN/1.73M2
GLUCOSE BLD-MCNC: 88 MG/DL (ref 70–108)
HCT VFR BLD CALC: 37.5 % (ref 42–52)
HEMOGLOBIN: 12.5 GM/DL (ref 14–18)
LYMPHOCYTES # BLD: 17.5 %
LYMPHOCYTES ABSOLUTE: 1 THOU/MM3 (ref 1–4.8)
MCH RBC QN AUTO: 29.8 PG (ref 27–31)
MCHC RBC AUTO-ENTMCNC: 33.4 GM/DL (ref 33–37)
MCV RBC AUTO: 89.3 FL (ref 80–94)
MONOCYTES # BLD: 11 %
MONOCYTES ABSOLUTE: 0.6 THOU/MM3 (ref 0.4–1.3)
NUCLEATED RED BLOOD CELLS: 0 /100 WBC
OSMOLALITY CALCULATION: 275.9 MOSMOL/KG (ref 275–300)
PDW BLD-RTO: 15.3 % (ref 11.5–14.5)
PLATELET # BLD: 156 THOU/MM3 (ref 130–400)
PMV BLD AUTO: 9.7 MCM (ref 7.4–10.4)
POTASSIUM SERPL-SCNC: 4.4 MEQ/L (ref 3.5–5.2)
RBC # BLD: 4.2 MILL/MM3 (ref 4.7–6.1)
SEG NEUTROPHILS: 66.5 %
SEGMENTED NEUTROPHILS ABSOLUTE COUNT: 3.9 THOU/MM3 (ref 1.8–7.7)
SODIUM BLD-SCNC: 138 MEQ/L (ref 135–145)
TOTAL PROTEIN: 6.5 G/DL (ref 6.1–8)
WBC # BLD: 5.9 THOU/MM3 (ref 4.8–10.8)

## 2017-11-13 PROCEDURE — 6370000000 HC RX 637 (ALT 250 FOR IP): Performed by: PHYSICIAN ASSISTANT

## 2017-11-13 PROCEDURE — 99284 EMERGENCY DEPT VISIT MOD MDM: CPT

## 2017-11-13 PROCEDURE — 85025 COMPLETE CBC W/AUTO DIFF WBC: CPT

## 2017-11-13 PROCEDURE — 96374 THER/PROPH/DIAG INJ IV PUSH: CPT

## 2017-11-13 PROCEDURE — 36415 COLL VENOUS BLD VENIPUNCTURE: CPT

## 2017-11-13 PROCEDURE — 93970 EXTREMITY STUDY: CPT

## 2017-11-13 PROCEDURE — 6360000002 HC RX W HCPCS: Performed by: PHYSICIAN ASSISTANT

## 2017-11-13 PROCEDURE — 80053 COMPREHEN METABOLIC PANEL: CPT

## 2017-11-13 RX ORDER — OXYCODONE HYDROCHLORIDE 5 MG/1
20 TABLET ORAL ONCE
Status: COMPLETED | OUTPATIENT
Start: 2017-11-13 | End: 2017-11-13

## 2017-11-13 RX ORDER — FUROSEMIDE 10 MG/ML
40 INJECTION INTRAMUSCULAR; INTRAVENOUS ONCE
Status: COMPLETED | OUTPATIENT
Start: 2017-11-13 | End: 2017-11-13

## 2017-11-13 RX ADMIN — FUROSEMIDE 40 MG: 10 INJECTION, SOLUTION INTRAMUSCULAR; INTRAVENOUS at 13:46

## 2017-11-13 RX ADMIN — OXYCODONE HYDROCHLORIDE 20 MG: 5 TABLET ORAL at 17:06

## 2017-11-13 ASSESSMENT — ENCOUNTER SYMPTOMS
STRIDOR: 0
VOMITING: 0
DIARRHEA: 0
RHINORRHEA: 0
COUGH: 0
EYE DISCHARGE: 0
ABDOMINAL DISTENTION: 0
SHORTNESS OF BREATH: 0
PHOTOPHOBIA: 0
FACIAL SWELLING: 0
NAUSEA: 0
COLOR CHANGE: 0
EYE REDNESS: 0

## 2017-11-13 ASSESSMENT — PAIN DESCRIPTION - ONSET: ONSET: ON-GOING

## 2017-11-13 ASSESSMENT — PAIN SCALES - GENERAL
PAINLEVEL_OUTOF10: 6

## 2017-11-13 ASSESSMENT — PAIN DESCRIPTION - PAIN TYPE
TYPE: ACUTE PAIN
TYPE: ACUTE PAIN

## 2017-11-13 ASSESSMENT — PAIN DESCRIPTION - ORIENTATION: ORIENTATION: RIGHT;LEFT

## 2017-11-13 ASSESSMENT — PAIN DESCRIPTION - DESCRIPTORS: DESCRIPTORS: CONSTANT;ACHING

## 2017-11-13 ASSESSMENT — PAIN DESCRIPTION - FREQUENCY: FREQUENCY: CONTINUOUS

## 2017-11-13 ASSESSMENT — PAIN DESCRIPTION - PROGRESSION: CLINICAL_PROGRESSION: NOT CHANGED

## 2017-11-13 ASSESSMENT — PAIN DESCRIPTION - LOCATION
LOCATION: LEG
LOCATION: LEG

## 2017-11-13 NOTE — ED NOTES
IV catheter no longer intact. Pt to vascular. Will have to gain another access once pt returns.      Coy English RN  11/13/17 2007

## 2017-11-13 NOTE — ED PROVIDER NOTES
[facility] EMERGENCY DEPT      CHIEF COMPLAINT       Chief Complaint   Patient presents with    Leg Swelling     Bilateral     Foot Swelling     Bilateral        Nurses Notes reviewed and I agree except as noted in the HPI. HISTORY OF PRESENT ILLNESS    Rashawn Gottlieb is a 59 y.o. male who presents to the ED for evaluation of lower extremity swelling. The patient states he has intermittent swelling of his lower extremities, but it has increased over the last 2-3 days. He states he takes lasix daily, although he is prescribed to take it twice per day, he only takes it once per day and not routinely. The patient reports associated pain, which he rates as a 6/10 in severity and describes as tight in character. He denies chest pain, shortness of breath, fever, chills, numbness or tingling. He reports history of lymphedema and cancer. The patient reports having a superficial blood clot in his right lower extremity a few months ago that did not require any treatment. Location/Symptom: lower extremity swelling  Timing/Onset: chronic, worse in the last 2-3 days  Context/Setting: history of lymphedema, takes lasix but not compliant with scheduled dosing  Quality: swelling, tightness  Duration: constant  Modifying Factors: only taking one lasix per day without relief  Severity: 6/10    REVIEW OF SYSTEMS     Review of Systems   Constitutional: Negative for chills, diaphoresis and fever. HENT: Negative for ear discharge, facial swelling and rhinorrhea. Eyes: Negative for photophobia, discharge and redness. Respiratory: Negative for cough, shortness of breath and stridor. Cardiovascular: Positive for leg swelling. Negative for palpitations. Gastrointestinal: Negative for abdominal distention, diarrhea, nausea and vomiting. Endocrine: Negative for polyuria. Genitourinary: Negative for decreased urine volume, dysuria and frequency. Musculoskeletal: Positive for myalgias.  Negative for gait problem and joint swelling. Skin: Negative for color change and rash (on exposed body surfaces). Neurological: Negative for tremors, syncope and numbness. Psychiatric/Behavioral: Negative for agitation and confusion. The patient is not nervous/anxious. PAST MEDICAL HISTORY    has a past medical history of Allergic conjunctivitis; Allergic rhinitis; Arthritis; Cervical radiculopathy; Chronic combined systolic and diastolic heart failure ([address] Chronic hepatitis C without hepatic coma ([address] Chronic pain syndrome; Chronic venous insufficiency; COPD (chronic obstructive pulmonary disease) (HCC); DDD (degenerative disc disease), thoracolumbar; Diffuse large B cell lymphoma ([address] follows with Dr Buena Boas; Enlarged thoracic aorta ([address] Essential hypertension; Failed back syndrome; CLIFF (generalized anxiety disorder); GERD (gastroesophageal reflux disease); History of lymphoma; Insomnia; Microscopic hematuria; Nicotine dependence; Osteopenia; Personal history of colonic polyps; Pulmonary nodules; and Sleep apnea. SURGICAL HISTORY      has a past surgical history that includes Colonoscopy (2013); Umbilical hernia repair (age 13 ); hernia repair; Appendectomy (age 13 ); back surgery (3/31/2014); other surgical history (OCT 2014); and ventral hernia repair (10/5/2015). CURRENT MEDICATIONS       Previous Medications    ACETAMINOPHEN 650 MG TABS    Take 650 mg by mouth every 4 hours as needed.     ALBUTEROL (PROVENTIL) (2.5 MG/3ML) 0.083% NEBULIZER SOLUTION    USE 1 VIAL VIA NEBULIZER EVERY 4 HOURS AS NEEDED FOR WHEEZING OR SHORTNESS OF BREATH    ALBUTEROL SULFATE HFA (VENTOLIN HFA) 108 (90 BASE) MCG/ACT INHALER    INHALE 2 PUFFS INTO THE LUNGS EVERY 6 HOURS AS NEEDED FOR WHEEZING    ASPIRIN 81 MG EC TABLET    Take 1 tablet by mouth daily    ATORVASTATIN (LIPITOR) 10 MG TABLET    TAKE 1 TABLET BY MOUTH ONCE DAILY    BUDESONIDE-FORMOTEROL (SYMBICORT) 160-4.5 MCG/ACT AERO    INHALE 2 PUFFS INTO THE LUNGS TWICE DAILY BUSPIRONE (BUSPAR) 15 MG TABLET    TAKE 1 TABLET BY MOUTH EVERY 6 HOURS    BUTALBITAL-ACETAMINOPHEN-CAFFEINE (FIORICET, ESGIC) -40 MG PER TABLET    TAKE 1 TABLET BY MOUTH EVERY 6 HOURS AS NEEDED FOR HEADACHE    CALCIUM-CHOLECALCIFEROL 500-200 MG-UNIT PER TABLET    Take 1 tablet by mouth 2 times daily    FLUTICASONE (FLONASE) 50 MCG/ACT NASAL SPRAY    USE 1 SPRAY IN EACH NOSTRIL ONCE DAILY    FUROSEMIDE (LASIX) 20 MG TABLET    TAKE 1 TABLET BY MOUTH EVERY DAY AS NEEDED FOR LEG SWELLING    GUAIFENESIN (MUCINEX) 600 MG EXTENDED RELEASE TABLET    Take 1 tablet by mouth 2 times daily as needed for Congestion    HARVONI  MG PER TABLET    Take 1 tablet by mouth daily     LIDOCAINE (XYLOCAINE) 2 % JELLY    Apply topically as needed. METOPROLOL TARTRATE (LOPRESSOR) 25 MG TABLET    TAKE 1 TABLET BY MOUTH TWICE DAILY    MISC. DEVICES (QUAD CANE) MISC    Adjustable narrow base quad cane    MISC. DEVICES MISC    TENS UNIT ELECTRODE PADS    Use daily as needed for back pain. MORPHINE (MS CONTIN) 30 MG EXTENDED RELEASE TABLET    Take 1 tablet by mouth 2 times daily . MULTIPLE VITAMIN (MULTIVITAMINS PO)    Take 1 tablet by mouth daily. NAPROXEN (NAPROSYN) 500 MG TABLET    TAKE 1 TABLET BY MOUTH TWICE DAILY AS NEEDED FOR PAIN    NUTRITIONAL SUPPLEMENTS (ENSURE) LIQD    Drink 4 cans per day    NYSTATIN (MYCOSTATIN) 377420 UNIT/ML SUSPENSION    Take 5 mLs by mouth 4 times daily    OLOPATADINE HCL (PAZEO) 0.7 % SOLN    Apply 1 drop to eye daily    OMEPRAZOLE (PRILOSEC) 20 MG DELAYED RELEASE CAPSULE    TAKE 1 CAPSULE BY MOUTH DAILY    ONDANSETRON (ZOFRAN) 4 MG TABLET    TAKE 1 TABLET BY MOUTH DAILY AS NEEDED FOR NAUSEA AND VOMITING    OXYCODONE (ROXICODONE) 20 MG IMMEDIATE RELEASE TABLET    Take 1 tablet by mouth every 4 hours as needed for Pain .     POLYETHYLENE GLYCOL (GLYCOLAX) POWDER    daily    TIZANIDINE (ZANAFLEX) 4 MG TABLET    Take 1 tablet by mouth every 8 hours as needed (back pain)    TRIAMCINOLONE (KENALOG) 0.1 % CREAM    Apply topically 2 times daily for up to 4 weeks, then weekends only as needed. ALLERGIES     is allergic to chantix [varenicline]; neurontin [gabapentin]; and tramadol. FAMILY HISTORY     indicated that his mother is alive. He indicated that his father is alive. He indicated that the status of his maternal grandmother is unknown. He indicated that his paternal grandfather is . family history includes Cancer in his paternal grandfather; Diabetes in his maternal grandmother and mother; Heart Disease in his mother; High Blood Pressure in his father and mother; High Cholesterol in his father and mother. SOCIAL HISTORY      reports that he has been smoking Cigarettes. He has a 30.00 pack-year smoking history. He has never used smokeless tobacco. He reports that he does not drink alcohol or use drugs. PHYSICAL EXAM     INITIAL VITALS:  height is 5' 5\" (1.651 m) and weight is 162 lb (73.5 kg). His oral temperature is 98.9 °F (37.2 °C). His blood pressure is 120/88 and his pulse is 80. His respiration is 12 and oxygen saturation is 95%. Physical Exam   Constitutional: He is oriented to person, place, and time. Vital signs are normal. He appears well-developed and well-nourished. He is active and cooperative. Non-toxic appearance. No distress. HENT:   Head: Normocephalic and atraumatic. Right Ear: Hearing normal.   Left Ear: Hearing normal.   Nose: Nose normal. No rhinorrhea or nasal deformity. No epistaxis. Mouth/Throat: Uvula is midline, oropharynx is clear and moist and mucous membranes are normal. No oropharyngeal exudate. Eyes: Conjunctivae, EOM and lids are normal. Pupils are equal, round, and reactive to light. No scleral icterus. Neck: Trachea normal and normal range of motion. Neck supple. No neck rigidity. No tracheal deviation present. Cardiovascular: Normal rate, regular rhythm and normal heart sounds. No murmur heard.   Pulses:       Dorsalis pedis pulses are 2+ on the right side, and 2+ on the left side. Unable to assess PT pulses secondary to edema   Pulmonary/Chest: Effort normal and breath sounds normal. No stridor. No tachypnea. No respiratory distress. He has no decreased breath sounds. He has no wheezes. Abdominal: Soft. He exhibits no distension. There is no tenderness. There is no rigidity and no guarding. Musculoskeletal: Normal range of motion. He exhibits edema (3+ to the bilateral lower extremities; no palpable tenderness; good ROM). He exhibits no tenderness. Lymphadenopathy:     He has no cervical adenopathy. Neurological: He is alert and oriented to person, place, and time. He has normal strength. No sensory deficit. Gait normal. GCS eye subscore is 4. GCS verbal subscore is 5. GCS motor subscore is 6. No gross deficits observed   Skin: Skin is warm, dry and intact. No rash noted. He is not diaphoretic. No pallor. Mild Erythema to the dorsum of the right foot consistent with stasis dermatitis. No excessive warmth. Psychiatric: He has a normal mood and affect. His speech is normal and behavior is normal. Thought content normal. Cognition and memory are normal.   Nursing note and vitals reviewed. DIFFERENTIAL DIAGNOSIS:   Including but not limited to: CHF, cellulitis, lymphedema, DVT, electrolyte imbalance    DIAGNOSTIC RESULTS     RADIOLOGY: non-plain film images(s) such as CT, Ultrasound and MRI are read by the radiologist.  Plain radiographic images are visualized and preliminarily interpreted by the emergency physician unless otherwise stated below. VL DUP LOWER EXTREMITY VENOUS BILATERAL   Final Result   Normal venous ultrasound. No evidence for acute deep venous thrombosis. **This report has been created using voice recognition software. It may contain minor errors which are inherent in voice recognition technology. **      Final report electronically signed by Dr. Tiff Perez on 11/13/2017 2:46 PM LABS:   Labs Reviewed   CBC WITH AUTO DIFFERENTIAL - Abnormal; Notable for the following:        Result Value    RBC 4.20 (*)     Hemoglobin 12.5 (*)     Hematocrit 37.5 (*)     RDW 15.3 (*)     All other components within normal limits   COMPREHENSIVE METABOLIC PANEL - Abnormal; Notable for the following:     AST 50 (*)     Alkaline Phosphatase 191 (*)     All other components within normal limits   GLOMERULAR FILTRATION RATE, ESTIMATED - Abnormal; Notable for the following:     Est, Glom Filt Rate 86 (*)     All other components within normal limits   ANION GAP   OSMOLALITY       EMERGENCY DEPARTMENT COURSE:   Vitals:    Vitals:    11/13/17 1311 11/13/17 1346 11/13/17 1511 11/13/17 1602   BP: 120/88 124/80 (!) 125/90 120/88   Pulse: 78 70 74 80   Resp: 18 19 12 12   Temp:       TempSrc:       SpO2: 96% 96% 96% 95%   Weight:       Height:         The patient was seen and evaluated within the ED today for leg edema. On exam, I appreciated bilateral lower extremity edema but no signs of DVT, cellulitis, or vascular compromise. Old records were reviewed. Within the department, I observed the patient's vital signs to be within acceptable range. Radiological studies within the department revealed no evidence for DVT. Laboratory work was reassuring. Within the department, the patient was treated with Lasix. I observed the patient's condition to modestly improve during the duration of their stay. The patient states he feels much improved. The swelling has decreased and he is able to move his toes better. He has remained neurovascularly intact. I have considered  CHF, DVT, cellulitis and this patient's presentation is not consistent with such entities and therefore, no further testing was warranted. The patient was comfortable with the plan of discharge home and to follow up with with his PCP, whom he has an appointment with in 4 days.   We did discuss the benefits of compression stockings, again which he will

## 2017-11-13 NOTE — ED NOTES
IV accessed. Lasix administered to pt. Vitals assessed. Urinal given to pt. No concerns voiced at this time.      Anthony Garcia RN  11/13/17 9315

## 2017-11-13 NOTE — TELEPHONE ENCOUNTER
It's too early to fill anything else. He's going to have to make do with the oxycodone at the moment. It's a bit strange to all of a sudden have this feeling, with the 1st month he had no issues. I won't be able to fill anything until 30 days after the last fill.      Future Appointments  Date Time Provider Sara Almonte   11/17/2017 12:00 PM Juan Otto DO [facility] [place]   12/7/2017 1:20 PM Juan Otto DO [facility] [place]   2/22/2018 10:30 AM Gisselle Garcia MD Oncology [facility] [place]

## 2017-11-13 NOTE — ED TRIAGE NOTES
Comes to ER for evaluation of bilateral leg and feet swelling that started 2 days ago and worsening. Has pain along with swelling. Hx CHF.

## 2017-11-14 ENCOUNTER — TELEPHONE (OUTPATIENT)
Dept: FAMILY MEDICINE CLINIC | Age: 57
End: 2017-11-14

## 2017-11-14 NOTE — TELEPHONE ENCOUNTER
I'd stick with the 20 for now.      Can take 20mg bid for 5 days and then drop back down to once daily    Future Appointments  Date Time Provider Sara Almonte   11/17/2017 12:00 PM Tolu Fletcher, [address] [place] [address]   12/7/2017 1:20 PM Tolu Fletcher DO [facility]   2/22/2018 10:30 AM Alen Black MD Oncology [facility]

## 2017-11-17 ENCOUNTER — OFFICE VISIT (OUTPATIENT)
Dept: FAMILY MEDICINE CLINIC | Age: 57
End: 2017-11-17
Payer: MEDICARE

## 2017-11-17 VITALS
HEART RATE: 72 BPM | BODY MASS INDEX: 27.46 KG/M2 | HEIGHT: 65 IN | RESPIRATION RATE: 22 BRPM | DIASTOLIC BLOOD PRESSURE: 76 MMHG | SYSTOLIC BLOOD PRESSURE: 137 MMHG | WEIGHT: 164.8 LBS | TEMPERATURE: 98.3 F

## 2017-11-17 DIAGNOSIS — J43.9 PULMONARY EMPHYSEMA, UNSPECIFIED EMPHYSEMA TYPE (HCC): Chronic | ICD-10-CM

## 2017-11-17 DIAGNOSIS — M75.101 TEAR OF RIGHT ROTATOR CUFF, UNSPECIFIED TEAR EXTENT: ICD-10-CM

## 2017-11-17 DIAGNOSIS — F17.210 CIGARETTE NICOTINE DEPENDENCE WITHOUT COMPLICATION: Chronic | ICD-10-CM

## 2017-11-17 DIAGNOSIS — I87.2 CHRONIC VENOUS INSUFFICIENCY: Chronic | ICD-10-CM

## 2017-11-17 DIAGNOSIS — S46.212S BICEPS TENDON RUPTURE, LEFT, SEQUELA: ICD-10-CM

## 2017-11-17 DIAGNOSIS — Z98.890 S/P SHOULDER SURGERY: Primary | ICD-10-CM

## 2017-11-17 PROCEDURE — 4004F PT TOBACCO SCREEN RCVD TLK: CPT | Performed by: FAMILY MEDICINE

## 2017-11-17 PROCEDURE — 3023F SPIROM DOC REV: CPT | Performed by: FAMILY MEDICINE

## 2017-11-17 PROCEDURE — G8417 CALC BMI ABV UP PARAM F/U: HCPCS | Performed by: FAMILY MEDICINE

## 2017-11-17 PROCEDURE — G8484 FLU IMMUNIZE NO ADMIN: HCPCS | Performed by: FAMILY MEDICINE

## 2017-11-17 PROCEDURE — G8427 DOCREV CUR MEDS BY ELIG CLIN: HCPCS | Performed by: FAMILY MEDICINE

## 2017-11-17 PROCEDURE — 99214 OFFICE O/P EST MOD 30 MIN: CPT | Performed by: FAMILY MEDICINE

## 2017-11-17 PROCEDURE — G8926 SPIRO NO PERF OR DOC: HCPCS | Performed by: FAMILY MEDICINE

## 2017-11-17 PROCEDURE — 3017F COLORECTAL CA SCREEN DOC REV: CPT | Performed by: FAMILY MEDICINE

## 2017-11-17 RX ORDER — OXYCODONE HYDROCHLORIDE 20 MG/1
20 TABLET ORAL EVERY 4 HOURS PRN
Qty: 86 TABLET | Refills: 0 | Status: SHIPPED | OUTPATIENT
Start: 2017-11-17 | End: 2017-12-01 | Stop reason: DRUGHIGH

## 2017-11-17 RX ORDER — NEBULIZER ACCESSORIES
KIT MISCELLANEOUS
Qty: 1 KIT | Refills: 0 | Status: SHIPPED | OUTPATIENT
Start: 2017-11-17 | End: 2018-01-25 | Stop reason: SDUPTHER

## 2017-11-17 NOTE — PROGRESS NOTES
01/30/2017    HDL 77 01/09/2016    HDL 66 01/29/2015     Lab Results   Component Value Date    LDLCALC 48 01/30/2017    LDLCALC 83 01/09/2016    LDLCALC 57 01/29/2015     No results found for: LABVLDL  No results found for: CHOLHDLRATIO      DM Screen - 96 (JAN 2016)    Lab Results   Component Value Date    GLUCOSE 88 11/13/2017    GLUCOSE 134 12/12/2011         Colon Cancer Screening - NEG 3/13, repeat 5 years d/t questional fam hx  Tetanus - UTD 2007  Influenza Vaccine - UTD OCT 2016, wants to wait until NOV 2017  Pneumonia Vaccine - UTD DEC 2014 PPV 23/wants to wait until next visit [facility] 2016 as well/and July 2016/AND NOV 2016/MARCH 2017  Zostavax - UTD FALL 2014     PSA testing discussion - 0.3 MAY 2016  AAA Screening - age 70    Falls screening - N/A      Specialist List    Oncology: Aretha Carrizales  GI: Katie Maddox: [facility]  ID: Rosevelt Gilford      Current Outpatient Prescriptions   Medication Sig Dispense Refill    oxyCODONE (ROXICODONE) 20 MG immediate release tablet Take 1 tablet by mouth every 4 hours as needed for Pain  This will need to last 14 days at least.. 86 tablet 0    Respiratory Therapy Supplies (NEBULIZER COMPRESSOR) KIT COPD 1 kit 0    Respiratory Therapy Supplies (NEBULIZER/TUBING/MOUTHPIECE) KIT COPD 1 kit 0    Compression Stockings MISC by Does not apply route Bilateral compression stockings. 20-30mmHg 2 each 0    morphine (MS CONTIN) 30 MG extended release tablet Take 1 tablet by mouth 2 times daily .  (Patient taking differently: Take 30 mg by mouth daily  .) 60 tablet 0    Olopatadine HCl (PAZEO) 0.7 % SOLN Apply 1 drop to eye daily 3 Bottle 3    budesonide-formoterol (SYMBICORT) 160-4.5 MCG/ACT AERO INHALE 2 PUFFS INTO THE LUNGS TWICE DAILY 30.6 g 3    naproxen (NAPROSYN) 500 MG tablet TAKE 1 TABLET BY MOUTH TWICE DAILY AS NEEDED FOR PAIN 60 tablet 0    ondansetron (ZOFRAN) 4 MG tablet TAKE 1 TABLET BY MOUTH DAILY AS NEEDED FOR NAUSEA AND VOMITING 60 tablet 0    tiZANidine (ZANAFLEX) 4 MG tablet Take 1 tablet by mouth every 8 hours as needed (back pain) 90 tablet 1    albuterol (PROVENTIL) (2.5 MG/3ML) 0.083% nebulizer solution USE 1 VIAL VIA NEBULIZER EVERY 4 HOURS AS NEEDED FOR WHEEZING OR SHORTNESS OF BREATH 360 mL 3    lidocaine (XYLOCAINE) 2 % jelly Apply topically as needed. 5 Tube 6    furosemide (LASIX) 20 MG tablet TAKE 1 TABLET BY MOUTH EVERY DAY AS NEEDED FOR LEG SWELLING 90 tablet 3    guaiFENesin (MUCINEX) 600 MG extended release tablet Take 1 tablet by mouth 2 times daily as needed for Congestion 60 tablet 1    metoprolol tartrate (LOPRESSOR) 25 MG tablet TAKE 1 TABLET BY MOUTH TWICE DAILY 180 tablet 3    atorvastatin (LIPITOR) 10 MG tablet TAKE 1 TABLET BY MOUTH ONCE DAILY 90 tablet 3    butalbital-acetaminophen-caffeine (FIORICET, ESGIC) -40 MG per tablet TAKE 1 TABLET BY MOUTH EVERY 6 HOURS AS NEEDED FOR HEADACHE 60 tablet 1    polyethylene glycol (GLYCOLAX) powder daily  4    calcium-cholecalciferol 500-200 MG-UNIT per tablet Take 1 tablet by mouth 2 times daily 180 tablet 3    albuterol sulfate HFA (VENTOLIN HFA) 108 (90 BASE) MCG/ACT inhaler INHALE 2 PUFFS INTO THE LUNGS EVERY 6 HOURS AS NEEDED FOR WHEEZING 3 Inhaler 5    busPIRone (BUSPAR) 15 MG tablet TAKE 1 TABLET BY MOUTH EVERY 6 HOURS 360 tablet 3    nystatin (MYCOSTATIN) 155466 UNIT/ML suspension Take 5 mLs by mouth 4 times daily 400 mL 5    aspirin 81 MG EC tablet Take 1 tablet by mouth daily 30 tablet 3    omeprazole (PRILOSEC) 20 MG delayed release capsule TAKE 1 CAPSULE BY MOUTH DAILY 90 capsule 3    Misc. Devices MISC TENS UNIT ELECTRODE PADS    Use daily as needed for back pain. 4 each 3    Misc. Devices (QUAD CANE) MISC Adjustable narrow base quad cane 1 each 0    Nutritional Supplements (ENSURE) LIQD Drink 4 cans per day 120 Can 5    triamcinolone (KENALOG) 0.1 % cream Apply topically 2 times daily for up to 4 weeks, then weekends only as needed.  60 g 0    HARVONI  MG per tablet Take 1 tablet by mouth daily       fluticasone (FLONASE) 50 MCG/ACT nasal spray USE 1 SPRAY IN EACH NOSTRIL ONCE DAILY 3 Bottle 3    acetaminophen 650 MG TABS Take 650 mg by mouth every 4 hours as needed. 120 tablet 3    Multiple Vitamin (MULTIVITAMINS PO) Take 1 tablet by mouth daily. No current facility-administered medications for this visit. Facility-Administered Medications Ordered in Other Visits   Medication Dose Route Frequency Provider Last Rate Last Dose    Absorbable Collagen Hemostat MISC 0.5 g  0.5 g Intra-Lesional Once Garcia Lucia MD         Orders Placed This Encounter   Medications    oxyCODONE (ROXICODONE) 20 MG immediate release tablet     Sig: Take 1 tablet by mouth every 4 hours as needed for Pain  This will need to last 14 days at least.. Dispense:  86 tablet     Refill:  0     Pt is post-operative period, recent biceps tendon repair (2 weeks ago) I am managing his post-op pain. 14 more days only at this dose and frequency.  Respiratory Therapy Supplies (NEBULIZER COMPRESSOR) KIT     Sig: COPD     Dispense:  1 kit     Refill:  0    Respiratory Therapy Supplies (NEBULIZER/TUBING/MOUTHPIECE) KIT     Sig: COPD     Dispense:  1 kit     Refill:  0    Compression Stockings MISC     Sig: by Does not apply route Bilateral compression stockings. 20-30mmHg     Dispense:  2 each     Refill:  0         All medications reviewed and reconciled, including OTC and herbal medications. Updated list given to patient.        Patient Active Problem List    Diagnosis Date Noted    Biceps tendon rupture, left, sequela 11/02/2017    Tear of right rotator cuff 09/25/2017    Chronic sinusitis 07/25/2017    Chronic venous insufficiency     Allergic conjunctivitis 06/20/2017    Microscopic hematuria 05/31/2017     Following with urology now      Rupture of distal biceps tendon 05/08/2017    Osteopenia     History of lymphoma     Chronic pain of right knee 01/20/2017    thickness.   Ejection fraction is visually estimated 55 %.  Mildly enlarged right atrium size.   Mildly enlarged right ventricle cavity.   Mild tricuspid regurgitation visualized. ASSESSMENT & PLAN  1. S/P shoulder surgery    Counseled on appropriate use of pain medications. If con't to be an issue, will have to stop. He understands. Typically reliable  con't MS contin at once daily for now  For the next 14 days will have him use oxuy IR 20mg q4h prn pain. Will de-escalate in 2 wks. Goal is oxy IR at 10mg 0.5-1 tab po q8h prn with maximize long-acting pain MS contin. OAARS reviewed and appropriate. Controlled Substances Monitoring:     Attestation: The Prescription Monitoring Report for this patient was reviewed today. Ryan Serrano DO)  Documentation: Possible medication side effects, risk of tolerance and/or dependence, and alternative treatments discussed., Obtaining appropriate analgesic effect of treatment., Existing medication contract. Bridget Ogden DO)     - oxyCODONE (ROXICODONE) 20 MG immediate release tablet; Take 1 tablet by mouth every 4 hours as needed for Pain  This will need to last 14 days at least.. Dispense: 86 tablet; Refill: 0    2. Tear of right rotator cuff, unspecified tear extent    - oxyCODONE (ROXICODONE) 20 MG immediate release tablet; Take 1 tablet by mouth every 4 hours as needed for Pain  This will need to last 14 days at least.. Dispense: 86 tablet; Refill: 0    3. Biceps tendon rupture, left, sequela    - oxyCODONE (ROXICODONE) 20 MG immediate release tablet; Take 1 tablet by mouth every 4 hours as needed for Pain  This will need to last 14 days at least.. Dispense: 86 tablet; Refill: 0    4.  Pulmonary emphysema, unspecified emphysema type ([address]    Stable  con't symbicort and prn alb  Needs new neb machine and supplies, so written  Needs to quit smoking, declines  COPD action plan reviewed    - Respiratory Therapy Supplies (NEBULIZER COMPRESSOR) KIT; COPD  Dispense: 1 kit; Refill: 0  - Respiratory Therapy Supplies (NEBULIZER/TUBING/MOUTHPIECE) KIT; COPD  Dispense: 1 kit; Refill: 0    5. Chronic venous insufficiency    con't prn lasix   Keep legs elevated when at rest  Add comprssion stockings     - Compression Stockings MISC; by Does not apply route Bilateral compression stockings. 20-30mmHg  Dispense: 2 each; Refill: 0    6. Cigarette nicotine dependence without complication    In precontemplation stage and not ready to quit. Declines cessation, aware of risks of continued smoking as well as resources available to help quit. These include tobacco cessation classes as well as 1-800-QUIT NOW. No barriers other than lack of desire. 3+ min spent counseling. DISPOSITION    Return in 2 weeks (on 12/1/2017) for f/u pain and post-op issues, sooner as needed. Kris Cevallos released without restrictions. Future Appointments  Date Time Provider Sara Almonte   11/20/2017 5:30 PM KP Dougherty OT None   11/30/2017 2:00 PM Ally Rodriguez DO [facility]   12/7/2017 1:20 PM Ally Rodriguez, [address] [place] [address]   2/22/2018 10:30 AM Perry Dickson MD Oncology [address] received counseling on the following healthy behaviors: nutrition, exercise, medication adherence and tobacco cessation    Patient given educational materials on: See Attached    I have instructed Kris Cevallos to complete a self tracking handout on Smoking and instructed them to bring it with them to his next appointment. Barriers to learning and self management: none    Discussed use, benefit, and side effects of prescribed medications. Barriers to medication compliance addressed. All patient questions answered. Pt voiced understanding.        Electronically signed by Ally Rodriguez DO on 11/17/2017 at 12:57 PM

## 2017-11-17 NOTE — PATIENT INSTRUCTIONS
up with a pulmonary rehabilitation program, talk to him or her about whether rehab is right for you. Rehab includes exercise programs, education about your disease and how to manage it, help with diet and other changes, and emotional support. · Eat regular, healthy meals. Use bronchodilators about 1 hour before you eat to make it easier to eat. Eat several small meals instead of three large ones. Drink beverages at the end of the meal. Avoid foods that are hard to chew. Follow-up care is a key part of your treatment and safety. Be sure to make and go to all appointments, and call your doctor if you are having problems. It's also a good idea to know your test results and keep a list of the medicines you take. Where can you learn more? Go to https://Ac[employer]x Therapeutics.Avocadoâ„¢. org and sign in to your Liiiike account. Enter V314 in the AltspaceVR box to learn more about \"Learning About COPD. \"     If you do not have an account, please click on the \"Sign Up Now\" link. Current as of: March 25, 2017  Content Version: 11.3  © 5908-6348 [employer]iva. Care instructions adapted under license by [facility] ([facility]). If you have questions about a medical condition or this instruction, always ask your healthcare professional. [address] any warranty or liability for your use of this information. Patient Education        Stopping Smoking: Care Instructions  Your Care Instructions  Cigarette smokers crave the nicotine in cigarettes. Giving it up is much harder than simply changing a habit. Your body has to stop craving the nicotine. It is hard to quit, but you can do it. There are many tools that people use to quit smoking. You may find that combining tools works best for you. There are several steps to quitting. First you get ready to quit. Then you get support to help you. After that, you learn new skills and behaviors to become a nonsmoker.  For many people, a necessary step is getting and using medicine. Your doctor will help you set up the plan that best meets your needs. You may want to attend a smoking cessation program to help you quit smoking. When you choose a program, look for one that has proven success. Ask your doctor for ideas. You will greatly increase your chances of success if you take medicine as well as get counseling or join a cessation program.  Some of the changes you feel when you first quit tobacco are uncomfortable. Your body will miss the nicotine at first, and you may feel short-tempered and grumpy. You may have trouble sleeping or concentrating. Medicine can help you deal with these symptoms. You may struggle with changing your smoking habits and rituals. The last step is the tricky one: Be prepared for the smoking urge to continue for a time. This is a lot to deal with, but keep at it. You will feel better. Follow-up care is a key part of your treatment and safety. Be sure to make and go to all appointments, and call your doctor if you are having problems. Its also a good idea to know your test results and keep a list of the medicines you take. How can you care for yourself at home? · Ask your family, friends, and coworkers for support. You have a better chance of quitting if you have help and support. · Join a support group, such as Nicotine Anonymous, for people who are trying to quit smoking. · Consider signing up for a smoking cessation program, such as the American Lung Association's Freedom from Smoking program.  · Set a quit date. Pick your date carefully so that it is not right in the middle of a big deadline or stressful time. Once you quit, do not even take a puff. Get rid of all ashtrays and lighters after your last cigarette. Clean your house and your clothes so that they do not smell of smoke. · Learn how to be a nonsmoker. Think about ways you can avoid those things that make you reach for a cigarette.   ¨ Avoid situations that put you at Health. If you have questions about a medical condition or this instruction, always ask your healthcare professional. [place] [address] any warranty or liability for your use of this information.

## 2017-11-30 DIAGNOSIS — M75.101 TEAR OF RIGHT ROTATOR CUFF, UNSPECIFIED TEAR EXTENT: ICD-10-CM

## 2017-11-30 DIAGNOSIS — S46.212S BICEPS TENDON RUPTURE, LEFT, SEQUELA: ICD-10-CM

## 2017-11-30 DIAGNOSIS — M19.90 ARTHRITIS: Chronic | ICD-10-CM

## 2017-11-30 DIAGNOSIS — M96.1 FAILED BACK SYNDROME: Chronic | ICD-10-CM

## 2017-11-30 DIAGNOSIS — M51.35 DDD (DEGENERATIVE DISC DISEASE), THORACOLUMBAR: Chronic | ICD-10-CM

## 2017-11-30 DIAGNOSIS — Z98.890 S/P SHOULDER SURGERY: Primary | ICD-10-CM

## 2017-11-30 PROBLEM — J32.9 CHRONIC SINUSITIS: Status: RESOLVED | Noted: 2017-07-25 | Resolved: 2017-11-30

## 2017-11-30 NOTE — TELEPHONE ENCOUNTER
Ok, but the point of this visit was to discuss continued de-escalation of his pain medications. My plan was to change from 20mg every 4 hours prn to 10mg q4 or q6 hours prn pain depending on how he was doing. Also, can't fill until tomorrow anyhow    Please update pt on my plan and let me know if questions. Thanks!

## 2017-12-01 RX ORDER — NAPROXEN 500 MG/1
TABLET ORAL
Qty: 60 TABLET | Refills: 0 | Status: SHIPPED | OUTPATIENT
Start: 2017-12-01 | End: 2017-12-24 | Stop reason: SDUPTHER

## 2017-12-01 RX ORDER — OXYCODONE HYDROCHLORIDE 10 MG/1
10 TABLET ORAL EVERY 4 HOURS PRN
Qty: 84 TABLET | Refills: 0 | Status: SHIPPED | OUTPATIENT
Start: 2017-12-01 | End: 2017-12-15 | Stop reason: SDUPTHER

## 2017-12-01 NOTE — TELEPHONE ENCOUNTER
ASSESSMENT & PLAN  1. S/P shoulder surgery    - oxyCODONE HCl (OXY-IR) 10 MG immediate release tablet; Take 1 tablet by mouth every 4 hours as needed for Pain . Dispense: 84 tablet; Refill: 0    2. Tear of right rotator cuff, unspecified tear extent    - oxyCODONE HCl (OXY-IR) 10 MG immediate release tablet; Take 1 tablet by mouth every 4 hours as needed for Pain . Dispense: 84 tablet; Refill: 0    3. Biceps tendon rupture, left, sequela    - oxyCODONE HCl (OXY-IR) 10 MG immediate release tablet; Take 1 tablet by mouth every 4 hours as needed for Pain . Dispense: 84 tablet; Refill: 0    4. DDD (degenerative disc disease), thoracolumbar    - oxyCODONE HCl (OXY-IR) 10 MG immediate release tablet; Take 1 tablet by mouth every 4 hours as needed for Pain . Dispense: 84 tablet; Refill: 0    5. Failed back syndrome    - oxyCODONE HCl (OXY-IR) 10 MG immediate release tablet; Take 1 tablet by mouth every 4 hours as needed for Pain . Dispense: 84 tablet; Refill: 0      OAARS reviewed and appropriate. Controlled Substances Monitoring: Attestation: The Prescription Monitoring Report for this patient was reviewed today. BHUPINDER Polanco  Documentation: Possible medication side effects, risk of tolerance and/or dependence, and alternative treatments discussed., No signs of potential drug abuse or diversion identified., Existing medication contract. Coral Rice DO)  Chronic Pain: Treatment objectives documented - patient is progressing appropriately.  Coral Rice DO)      Future Appointments  Date Time Provider Sara Almonte   12/7/2017 1:20 PM Coral Rice [address] [place] [address]   2/22/2018 10:30 AM Char Serrano [facility]       Medications Discontinued During This Encounter   Medication Reason    oxyCODONE (ROXICODONE) 20 MG immediate release tablet Dose adjustment

## 2017-12-07 ENCOUNTER — OFFICE VISIT (OUTPATIENT)
Dept: FAMILY MEDICINE CLINIC | Age: 57
End: 2017-12-07
Payer: MEDICARE

## 2017-12-07 VITALS
RESPIRATION RATE: 24 BRPM | TEMPERATURE: 98.3 F | HEIGHT: 65 IN | WEIGHT: 169.4 LBS | HEART RATE: 84 BPM | DIASTOLIC BLOOD PRESSURE: 88 MMHG | SYSTOLIC BLOOD PRESSURE: 124 MMHG | BODY MASS INDEX: 28.22 KG/M2

## 2017-12-07 DIAGNOSIS — M96.1 FAILED BACK SYNDROME: Chronic | ICD-10-CM

## 2017-12-07 DIAGNOSIS — G89.4 CHRONIC PAIN SYNDROME: Chronic | ICD-10-CM

## 2017-12-07 DIAGNOSIS — F17.210 CIGARETTE NICOTINE DEPENDENCE WITHOUT COMPLICATION: Chronic | ICD-10-CM

## 2017-12-07 DIAGNOSIS — M75.101 TEAR OF RIGHT ROTATOR CUFF, UNSPECIFIED TEAR EXTENT: ICD-10-CM

## 2017-12-07 DIAGNOSIS — Z98.890 S/P SHOULDER SURGERY: ICD-10-CM

## 2017-12-07 DIAGNOSIS — S46.212S BICEPS TENDON RUPTURE, LEFT, SEQUELA: Primary | ICD-10-CM

## 2017-12-07 PROCEDURE — 3017F COLORECTAL CA SCREEN DOC REV: CPT | Performed by: FAMILY MEDICINE

## 2017-12-07 PROCEDURE — G8417 CALC BMI ABV UP PARAM F/U: HCPCS | Performed by: FAMILY MEDICINE

## 2017-12-07 PROCEDURE — 4004F PT TOBACCO SCREEN RCVD TLK: CPT | Performed by: FAMILY MEDICINE

## 2017-12-07 PROCEDURE — G8427 DOCREV CUR MEDS BY ELIG CLIN: HCPCS | Performed by: FAMILY MEDICINE

## 2017-12-07 PROCEDURE — 99213 OFFICE O/P EST LOW 20 MIN: CPT | Performed by: FAMILY MEDICINE

## 2017-12-07 PROCEDURE — G8484 FLU IMMUNIZE NO ADMIN: HCPCS | Performed by: FAMILY MEDICINE

## 2017-12-07 RX ORDER — MORPHINE SULFATE 60 MG/1
60 TABLET, FILM COATED, EXTENDED RELEASE ORAL 2 TIMES DAILY
Qty: 60 TABLET | Refills: 0 | Status: SHIPPED | OUTPATIENT
Start: 2017-12-07 | End: 2018-01-02 | Stop reason: SDUPTHER

## 2017-12-07 NOTE — PROGRESS NOTES
Chief Complaint   Patient presents with    Follow-up     pain issues       History obtained from the patient. SUBJECTIVE:  Bo Muller is a 59 y.o. male that presents today for       1.) PRIOR VISIT s/p L biceps repair with Dr. Dyana Castillo yesterday. Dr. Boby Shannon wanted me to manage his pain since I have him on pain medication for chronic pain. Currently on MS contin 30mg bid and oxy IR 20mg q6h prn (we're working to get him to 0.5 to 1 tab po q8h prn and eventually to 10mg tid prn). But, d/t recent surgery need to escalate. Has enough oxy IR 20 and MS contin 30. Feels pain ok on what we have him on, just asking to take ox IR every 4 hours prn in the post-op period. Doing well post op. L arm in sling. Surgery went well. UPDATE LAST VISIT: here for about 2 wks f/u. Is about out of oxy IR 20mg, a day or so early. He had initial issues with his MS Contin, not working and causing some itching, so he used more oxy. He is now taking MS Contin once daily and is working better for him and no side effects so is back to taking oxy as rx'd. Here for refill. Starting OT next wk. Out of sling. UPDATE TODAY: here for f/u. Was to f/u labs wk, but was sick. I dec his oxy IR to 10mg q4h prn from 20mg q4h prn pain as was the plan before he cancelled his apt. On 10 OCT, pt was started on MS contin 30mg bid to see if this would help with his chronic back pain. Initially he stated it was working, but then later stated it caused itching, so he decreased this to 30mg once daily and said that was working ok. However, now has been taking mscontin again BID, itching gone, but not getting much pain relief. Asking if this can be increased. Oxy 10mg q4h prn not working well enough with the 30mg bid of mscontin. OAARS reviewed. Has ortho f/u next wk. Missed 1st OT apt. Has f/u again soon. 2.) Smoking: UPDATE TODAY: has patch and gum. Has cut down to maybe 3/4 PPD. No change from last visit.  Declines further 1 each 0    Nutritional Supplements (ENSURE) LIQD Drink 4 cans per day 120 Can 5    triamcinolone (KENALOG) 0.1 % cream Apply topically 2 times daily for up to 4 weeks, then weekends only as needed. 60 g 0    HARVONI  MG per tablet Take 1 tablet by mouth daily       fluticasone (FLONASE) 50 MCG/ACT nasal spray USE 1 SPRAY IN EACH NOSTRIL ONCE DAILY 3 Bottle 3    acetaminophen 650 MG TABS Take 650 mg by mouth every 4 hours as needed. 120 tablet 3    Multiple Vitamin (MULTIVITAMINS PO) Take 1 tablet by mouth daily. No current facility-administered medications for this visit. Facility-Administered Medications Ordered in Other Visits   Medication Dose Route Frequency Provider Last Rate Last Dose    Absorbable Collagen Hemostat MISC 0.5 g  0.5 g Intra-Lesional Once Dragan Fletcher MD         Orders Placed This Encounter   Medications    morphine (MS CONTIN) 60 MG extended release tablet     Sig: Take 1 tablet by mouth 2 times daily . Dispense:  60 tablet     Refill:  0         All medications reviewed and reconciled, including OTC and herbal medications. Updated list given to patient.        Patient Active Problem List    Diagnosis Date Noted    S/P shoulder surgery 12/07/2017    Biceps tendon rupture, left, sequela 11/02/2017    Tear of right rotator cuff 09/25/2017    Chronic venous insufficiency     Allergic conjunctivitis 06/20/2017    Microscopic hematuria 05/31/2017     Following with urology now      Rupture of distal biceps tendon 05/08/2017    Osteopenia     History of lymphoma     Chronic pain of right knee 01/20/2017    Bilateral edema of lower extremity 06/23/2016    Enlarged thoracic aorta ([address]      Upper limits of normal size on CT chest with contrast 2016      Ventral hernia without obstruction or gangrene     Pulmonary nodules 09/15/2015     Being monitored by oncology      Cervical radiculopathy 09/15/2015    CLIFF (generalized anxiety disorder) 08/07/2015    Systems  Positive responses are highlighted in bold    Constitutional:  Fever, Chills, Night Sweats, Fatigue, Unexpected changes in weight  HENT:  Ear pain, Tinnitus, Nosebleeds, Trouble swallowing, Hearing loss  Cardiovascular:  Chest Pain, Palpitations, Orthopnea, Paroxysmal Nocturnal Dyspnea  Respiratory:  Cough, Wheezing, Shortness of breath, Chest tightness, Apnea  Gastrointestinal:  Nausea, Vomiting, Diarrhea, Constipation, Heartburn, Blood in stool  Genitourinary:  Difficulty or painful urination, Flank pain, Change in frequency, Urgency  Skin:  Color change, Rash, Itching, Wound  Musculoskeletal:  Joint pain, Back pain, Gait problems, Joint swelling, Myalgias  Neurological:  Dizziness, Headaches, Presyncope, Numbness, Seizures, Tremors  Endocrine:  Heat Intolerance, Cold Intolerance, Polydipsia, Polyphagia, Polyuria      PHYSICAL EXAM:  Vitals:    12/07/17 1307   BP: 124/88   Pulse: 84   Resp: 24   Temp: 98.3 °F (36.8 °C)   Weight: 169 lb 6.4 oz (76.8 kg)   Height: 5' 5\" (1.651 m)     Body mass index is 28.19 kg/m². VS Reviewed  General Appearance: A&O x 3, No acute distress,well developed and well- nourished  Head: normocephalic and atraumatic  Eyes: pupils equal, round, and reactive to light, extraocular eye movements intact, conjunctivae and eye lids without erythema  ENT: external ear and ear canal clear bilaterally, TMs intact and regular, nose without deformity, nasal mucosa and turbinates normal without polyps, oropharynx normal, dentition is normal for age  Neck: supple and non-tender without mass, no thyromegaly or thyroid nodules, no cervical lymphadenopathy  Pulmonary/Chest: distant with good air movement bilaterally- no wheezing throughout, no rales or rhonchi, no respiratory distress or retractions  Cardiovascular: Distant, S1 and S2 auscultated w/ RRR. No murmurs, rubs, clicks, or gallops, distal pulses intact.   Abdomen: soft, non-tender, non-distended, bowl sounds physiologic,  no DISPOSITION    Return in about 3 weeks (around 12/28/2017) for f/u chronic pain, sooner as needed. Bebo Quiros released without restrictions. Future Appointments  Date Time Provider Sara Almotne   2/22/2018 10:30 AM Karson Arthur MD [facility] [address] [place] [address] received counseling on the following healthy behaviors: nutrition, exercise, medication adherence and tobacco cessation    Patient given educational materials on: See Attached    I have instructed Bebo Quiros to complete a self tracking handout on Smoking and instructed them to bring it with them to his next appointment. Barriers to learning and self management: none    Discussed use, benefit, and side effects of prescribed medications. Barriers to medication compliance addressed. All patient questions answered. Pt voiced understanding.        Electronically signed by Alexandra rDiscoll DO on 12/7/2017 at 1:42 PM

## 2017-12-07 NOTE — PROGRESS NOTES
Visit Information    Have you changed or started any medications since your last visit including any over-the-counter medicines, vitamins, or herbal medicines? no   Are you having any side effects from any of your medications? -  no  Have you stopped taking any of your medications? Is so, why? -  no    Have you seen any other physician or provider since your last visit? No  Have you had any other diagnostic tests since your last visit? No  Have you been seen in the emergency room and/or had an admission to a hospital since we last saw you? No  Have you had your routine dental cleaning in the past 6 months? no    Have you activated your Calastone account? If not, what are your barriers?  Yes     Patient Care Team:  Ryan Serrano DO as PCP - General    Medical History Review  Past Medical, Family, and Social History reviewed and does not contribute to the patient presenting condition    Health Maintenance   Topic Date Due    Pneumococcal highest risk (2 of 3 - PCV13) 12/01/2015    PSA counseling  05/10/2017    Smoker: low dose lung CT screening  08/09/2017    Flu vaccine (1) 09/01/2017    DTaP/Tdap/Td vaccine (2 - Td) 01/20/2018 (Originally 1/1/2017)    HIV screen  03/20/2018 (Originally 11/9/1975)    Lipid screen  01/30/2018    Colon cancer screen colonoscopy  03/22/2018

## 2017-12-07 NOTE — PATIENT INSTRUCTIONS
exercise. Having healthy habits will help your body move past its craving for nicotine. · Be prepared to keep trying. Most people are not successful the first few times they try to quit. Do not get mad at yourself if you smoke again. Make a list of things you learned and think about when you want to try again, such as next week, next month, or next year. Where can you learn more? Go to https://MinubepeshaguftaMICROrganic Technologies.REALTIME.CO. org and sign in to your Context Aware Solutions account. Enter K238 in the NextNine box to learn more about \"Stopping Smoking: Care Instructions. \"     If you do not have an account, please click on the \"Sign Up Now\" link. Current as of: March 20, 2017  Content Version: 11.4  © 8922-7875 Healthwise, Incorporated. Care instructions adapted under license by [facility] ([facility]). If you have questions about a medical condition or this instruction, always ask your healthcare professional. [place] [address] any warranty or liability for your use of this information.

## 2017-12-12 ENCOUNTER — TELEPHONE (OUTPATIENT)
Dept: FAMILY MEDICINE CLINIC | Age: 57
End: 2017-12-12

## 2017-12-12 DIAGNOSIS — M75.101 TEAR OF RIGHT ROTATOR CUFF, UNSPECIFIED TEAR EXTENT: ICD-10-CM

## 2017-12-12 DIAGNOSIS — M51.35 DDD (DEGENERATIVE DISC DISEASE), THORACOLUMBAR: Chronic | ICD-10-CM

## 2017-12-12 DIAGNOSIS — M96.1 FAILED BACK SYNDROME: Chronic | ICD-10-CM

## 2017-12-12 DIAGNOSIS — S46.212S BICEPS TENDON RUPTURE, LEFT, SEQUELA: ICD-10-CM

## 2017-12-12 DIAGNOSIS — Z98.890 S/P SHOULDER SURGERY: ICD-10-CM

## 2017-12-12 NOTE — TELEPHONE ENCOUNTER
Pt calling in for refill. Order pended. Pt also wanted to inform Dr Patricia Ching that new Rx for morphine is very helpful. Requested Prescriptions     Pending Prescriptions Disp Refills    oxyCODONE HCl (OXY-IR) 10 MG immediate release tablet 84 tablet 0     Sig: Take 1 tablet by mouth every 4 hours as needed for Pain .  Earliest Fill Date: 12/12/17

## 2017-12-13 RX ORDER — OXYCODONE HYDROCHLORIDE 10 MG/1
10 TABLET ORAL EVERY 4 HOURS PRN
Qty: 84 TABLET | Refills: 0 | OUTPATIENT
Start: 2017-12-13

## 2017-12-13 NOTE — TELEPHONE ENCOUNTER
Too early for refill. He is to call me Friday morning and update me on pain with adjusted regimen  Plan on Friday, is to change oxy IR to 10mg q6h prn pain from q4 prn pain  He must call Friday morning.

## 2017-12-14 DIAGNOSIS — G44.219 EPISODIC TENSION-TYPE HEADACHE, NOT INTRACTABLE: ICD-10-CM

## 2017-12-14 DIAGNOSIS — K59.03 DRUG-INDUCED CONSTIPATION: Primary | ICD-10-CM

## 2017-12-14 RX ORDER — POLYETHYLENE GLYCOL 3350 17 G/17G
POWDER, FOR SOLUTION ORAL
Qty: 527 G | Refills: 0 | Status: SHIPPED | OUTPATIENT
Start: 2017-12-14 | End: 2018-03-01 | Stop reason: SDUPTHER

## 2017-12-14 RX ORDER — BUTALBITAL, ACETAMINOPHEN AND CAFFEINE 50; 325; 40 MG/1; MG/1; MG/1
TABLET ORAL
Qty: 60 TABLET | Refills: 0 | Status: SHIPPED | OUTPATIENT
Start: 2017-12-14 | End: 2018-02-11 | Stop reason: SDUPTHER

## 2017-12-15 ENCOUNTER — TELEPHONE (OUTPATIENT)
Dept: FAMILY MEDICINE CLINIC | Age: 57
End: 2017-12-15

## 2017-12-15 DIAGNOSIS — M75.101 TEAR OF RIGHT ROTATOR CUFF, UNSPECIFIED TEAR EXTENT: ICD-10-CM

## 2017-12-15 DIAGNOSIS — Z98.890 S/P SHOULDER SURGERY: Primary | ICD-10-CM

## 2017-12-15 DIAGNOSIS — M96.1 FAILED BACK SYNDROME: Chronic | ICD-10-CM

## 2017-12-15 DIAGNOSIS — S46.212S BICEPS TENDON RUPTURE, LEFT, SEQUELA: ICD-10-CM

## 2017-12-15 DIAGNOSIS — M51.35 DDD (DEGENERATIVE DISC DISEASE), THORACOLUMBAR: Chronic | ICD-10-CM

## 2017-12-15 RX ORDER — OXYCODONE HYDROCHLORIDE 10 MG/1
10 TABLET ORAL EVERY 6 HOURS PRN
Qty: 56 TABLET | Refills: 0 | Status: SHIPPED | OUTPATIENT
Start: 2017-12-15 | End: 2017-12-28 | Stop reason: SDUPTHER

## 2017-12-15 NOTE — TELEPHONE ENCOUNTER
Pt states he was to call the office today regarding his pain medication,pt states the pain as been under control with the medications.

## 2017-12-15 NOTE — TELEPHONE ENCOUNTER
ASSESSMENT & PLAN  1. S/P shoulder surgery    - oxyCODONE HCl (OXY-IR) 10 MG immediate release tablet; Take 1 tablet by mouth every 6 hours as needed for Pain . Dispense: 56 tablet; Refill: 0    2. Tear of right rotator cuff, unspecified tear extent    - oxyCODONE HCl (OXY-IR) 10 MG immediate release tablet; Take 1 tablet by mouth every 6 hours as needed for Pain . Dispense: 56 tablet; Refill: 0    3. Biceps tendon rupture, left, sequela    - oxyCODONE HCl (OXY-IR) 10 MG immediate release tablet; Take 1 tablet by mouth every 6 hours as needed for Pain . Dispense: 56 tablet; Refill: 0    4. DDD (degenerative disc disease), thoracolumbar    - oxyCODONE HCl (OXY-IR) 10 MG immediate release tablet; Take 1 tablet by mouth every 6 hours as needed for Pain . Dispense: 56 tablet; Refill: 0    5. Failed back syndrome    - oxyCODONE HCl (OXY-IR) 10 MG immediate release tablet; Take 1 tablet by mouth every 6 hours as needed for Pain . Dispense: 56 tablet; Refill: 0      OAARS reviewed and appropriate. Controlled Substances Monitoring: Attestation: The Prescription Monitoring Report for this patient was reviewed today. Tien Benavides DO)  Documentation: Possible medication side effects, risk of tolerance and/or dependence, and alternative treatments discussed., No signs of potential drug abuse or diversion identified., Existing medication contract.  Tien Benavides DO)      Future Appointments  Date Time Provider Sara Almonte   12/28/2017 4:20 PM Tien Benavides DO [facility] - [facility] Sharma   2/22/2018 10:30 AM Chalino Marie MD Oncology [facility] - [facility]arma

## 2017-12-18 ENCOUNTER — TELEPHONE (OUTPATIENT)
Dept: FAMILY MEDICINE CLINIC | Age: 57
End: 2017-12-18

## 2017-12-18 DIAGNOSIS — R11.2 NAUSEA AND VOMITING, INTRACTABILITY OF VOMITING NOT SPECIFIED, UNSPECIFIED VOMITING TYPE: Primary | ICD-10-CM

## 2017-12-18 RX ORDER — ONDANSETRON 4 MG/1
TABLET, FILM COATED ORAL
Qty: 60 TABLET | Refills: 0 | Status: SHIPPED | OUTPATIENT
Start: 2017-12-18 | End: 2018-06-01 | Stop reason: SDUPTHER

## 2017-12-18 NOTE — TELEPHONE ENCOUNTER
Patient is requesting something be called for upset stomach, diarrhea, body aches , sore throat and runny nose. Patient has had symptoms for 3 days.   Pharmacy: [facility]

## 2017-12-18 NOTE — TELEPHONE ENCOUNTER
Can do zofran for nausea  Diarrhea needs to run its course  Tylenol for aches and pains  Likely viral  F/u if no better    ASSESSMENT & PLAN  1. Nausea and vomiting, intractability of vomiting not specified, unspecified vomiting type    - ondansetron (ZOFRAN) 4 MG tablet; TAKE 1 TABLET BY MOUTH DAILY AS NEEDED FOR NAUSEA AND VOMITING  Dispense: 60 tablet;  Refill: 0      Future Appointments  Date Time Provider Sara Almonte   12/28/2017 4:20 PM Coral Rice DO [facility] - Felisha Cho   2/22/2018 10:30 AM Char Serrano MD Oncology [facility] - [place] [facility]

## 2017-12-24 DIAGNOSIS — M19.90 ARTHRITIS: Chronic | ICD-10-CM

## 2017-12-26 RX ORDER — NAPROXEN 500 MG/1
TABLET ORAL
Qty: 60 TABLET | Refills: 0 | Status: SHIPPED | OUTPATIENT
Start: 2017-12-26 | End: 2018-01-19 | Stop reason: SDUPTHER

## 2017-12-28 ENCOUNTER — OFFICE VISIT (OUTPATIENT)
Dept: FAMILY MEDICINE CLINIC | Age: 57
End: 2017-12-28
Payer: MEDICARE

## 2017-12-28 VITALS
HEART RATE: 84 BPM | RESPIRATION RATE: 28 BRPM | HEIGHT: 65 IN | WEIGHT: 161.2 LBS | DIASTOLIC BLOOD PRESSURE: 90 MMHG | TEMPERATURE: 98.2 F | SYSTOLIC BLOOD PRESSURE: 122 MMHG | BODY MASS INDEX: 26.86 KG/M2

## 2017-12-28 DIAGNOSIS — M51.35 DDD (DEGENERATIVE DISC DISEASE), THORACOLUMBAR: Chronic | ICD-10-CM

## 2017-12-28 DIAGNOSIS — S46.212S BICEPS TENDON RUPTURE, LEFT, SEQUELA: Primary | ICD-10-CM

## 2017-12-28 DIAGNOSIS — M96.1 FAILED BACK SYNDROME: Chronic | ICD-10-CM

## 2017-12-28 DIAGNOSIS — F17.210 CIGARETTE NICOTINE DEPENDENCE WITHOUT COMPLICATION: Chronic | ICD-10-CM

## 2017-12-28 DIAGNOSIS — Z98.890 S/P SHOULDER SURGERY: ICD-10-CM

## 2017-12-28 DIAGNOSIS — M75.101 TEAR OF RIGHT ROTATOR CUFF, UNSPECIFIED TEAR EXTENT: ICD-10-CM

## 2017-12-28 PROCEDURE — 3017F COLORECTAL CA SCREEN DOC REV: CPT | Performed by: FAMILY MEDICINE

## 2017-12-28 PROCEDURE — G8427 DOCREV CUR MEDS BY ELIG CLIN: HCPCS | Performed by: FAMILY MEDICINE

## 2017-12-28 PROCEDURE — G8417 CALC BMI ABV UP PARAM F/U: HCPCS | Performed by: FAMILY MEDICINE

## 2017-12-28 PROCEDURE — 4004F PT TOBACCO SCREEN RCVD TLK: CPT | Performed by: FAMILY MEDICINE

## 2017-12-28 PROCEDURE — G8484 FLU IMMUNIZE NO ADMIN: HCPCS | Performed by: FAMILY MEDICINE

## 2017-12-28 PROCEDURE — 99213 OFFICE O/P EST LOW 20 MIN: CPT | Performed by: FAMILY MEDICINE

## 2017-12-28 RX ORDER — OXYCODONE HYDROCHLORIDE 10 MG/1
10 TABLET ORAL EVERY 6 HOURS PRN
Qty: 56 TABLET | Refills: 0 | Status: SHIPPED | OUTPATIENT
Start: 2017-12-28 | End: 2018-01-11 | Stop reason: SDUPTHER

## 2017-12-28 NOTE — PATIENT INSTRUCTIONS
You may receive a survey about your visit with us today. The feedback from our patients helps us identify what is working well and where the service to all patients can be enhanced. Thank you! Patient Education        Stopping Smoking: Care Instructions  Your Care Instructions    Cigarette smokers crave the nicotine in cigarettes. Giving it up is much harder than simply changing a habit. Your body has to stop craving the nicotine. It is hard to quit, but you can do it. There are many tools that people use to quit smoking. You may find that combining tools works best for you. There are several steps to quitting. First you get ready to quit. Then you get support to help you. After that, you learn new skills and behaviors to become a nonsmoker. For many people, a necessary step is getting and using medicine. Your doctor will help you set up the plan that best meets your needs. You may want to attend a smoking cessation program to help you quit smoking. When you choose a program, look for one that has proven success. Ask your doctor for ideas. You will greatly increase your chances of success if you take medicine as well as get counseling or join a cessation program.  Some of the changes you feel when you first quit tobacco are uncomfortable. Your body will miss the nicotine at first, and you may feel short-tempered and grumpy. You may have trouble sleeping or concentrating. Medicine can help you deal with these symptoms. You may struggle with changing your smoking habits and rituals. The last step is the tricky one: Be prepared for the smoking urge to continue for a time. This is a lot to deal with, but keep at it. You will feel better. Follow-up care is a key part of your treatment and safety. Be sure to make and go to all appointments, and call your doctor if you are having problems. It's also a good idea to know your test results and keep a list of the medicines you take.   How can you care for yourself at exercise. Having healthy habits will help your body move past its craving for nicotine. · Be prepared to keep trying. Most people are not successful the first few times they try to quit. Do not get mad at yourself if you smoke again. Make a list of things you learned and think about when you want to try again, such as next week, next month, or next year. Where can you learn more? Go to https://MedTest DXpePhybridge.Oversee. org and sign in to your VitaSensis account. Enter Z163 in the CrossChx box to learn more about \"Stopping Smoking: Care Instructions. \"     If you do not have an account, please click on the \"Sign Up Now\" link. Current as of: March 20, 2017  Content Version: 11.4  © 4478-6405 Healthwise, Incorporated. Care instructions adapted under license by [facility] ([facility]). If you have questions about a medical condition or this instruction, always ask your healthcare professional. [place] [address] any warranty or liability for your use of this information.

## 2017-12-28 NOTE — PROGRESS NOTES
hematuria 05/31/2017     Following with urology now      Rupture of distal biceps tendon 05/08/2017    Osteopenia     History of lymphoma     Chronic pain of right knee 01/20/2017    Bilateral edema of lower extremity 06/23/2016    Enlarged thoracic aorta ([address]      Upper limits of normal size on CT chest with contrast 2016      Ventral hernia without obstruction or gangrene     Pulmonary nodules 09/15/2015     Being monitored by oncology      Cervical radiculopathy 09/15/2015    CLIFF (generalized anxiety disorder) 08/07/2015    Failed back syndrome 06/08/2015    Allergic rhinitis 06/08/2015    Chronic pain syndrome 04/05/2015    Insomnia 02/20/2015    History of chemotherapy 02/09/2015    Diffuse large B cell lymphoma ([address] follows with Dr Kaylynn Gatica 12/12/2014    Chronic combined systolic and diastolic heart failure ([address] 12/12/2014     EF 45-50% FEB 20152015/JUNE 2016  However, AUG 2017:  Summary   Left Ventricular size is Normal .   Normal left ventricular wall thickness.   Ejection fraction is visually estimated 55 %.  Mildly enlarged right atrium size.   Mildly enlarged right ventricle cavity.   Mild tricuspid regurgitation visualized.       DDD (degenerative disc disease), thoracolumbar 12/01/2014    Arthritis     Sleep apnea      no CPAP      GERD (gastroesophageal reflux disease)     Essential hypertension 10/20/2014    Nicotine dependence 07/16/2014    History of spinal stenosis 07/16/2014    Bilateral inguinal hernia (BIH) 09/26/2013    COPD (chronic obstructive pulmonary disease) ([address] 09/26/2013    Chronic hepatitis C without hepatic coma ([address] 09/26/2013          Past Medical History:   Diagnosis Date    Allergic conjunctivitis 6/20/2017    Allergic rhinitis 6/8/2015    Arthritis     Cervical radiculopathy 9/15/2015    Chronic combined systolic and diastolic heart failure ([address] 12/12/2014    EF 45-50% FEB 20152015/JUNE 2016    Chronic hepatitis C without hepatic coma ([address] 9/26/2013    Chronic pain syndrome 4/5/2015    Chronic venous insufficiency     COPD (chronic obstructive pulmonary disease) (HCC)     DDD (degenerative disc disease), thoracolumbar 12/1/2014    Diffuse large B cell lymphoma ([facility] follows with Dr Amedeo Mcburney 12/12/2014    Enlarged thoracic aorta ([address]     Upper limits of normal size on CT chest with contrast 2016    Essential hypertension     Failed back syndrome 6/8/2015    CLIFF (generalized anxiety disorder) 8/7/2015    GERD (gastroesophageal reflux disease)     History of lymphoma     Insomnia 2/20/2015    Microscopic hematuria 5/31/2017    Nicotine dependence 7/16/2014    Osteopenia     Personal history of colonic polyps 10/24/2014    Pulmonary nodules 9/15/2015    Being monitored by oncology     Sleep apnea     no CPAP         Past Surgical History:   Procedure Laterality Date    APPENDECTOMY  age 13    Chrystal James BACK SURGERY  3/31/2014    Lumbar Laminectomy L3-5    BICEPS TENDON REPAIR Left 11/01/2017    Dr. Avelino Henson  2013    Dr. Oksana Henson 2014    Billiary Stent    UMBILICAL HERNIA REPAIR  age 13    Chrystal Ottard VENTRAL HERNIA REPAIR  10/5/2015    w mesh         Allergies   Allergen Reactions    Chantix [Varenicline]      Makes him feel terrible    Neurontin [Gabapentin] Other (See Comments)     HALLUCINATIONS    Tramadol Nausea Only and Nausea And Vomiting         Social History     Social History    Marital status:      Spouse name: Erik Gauthier Number of children: 2    Years of education: 6     Occupational History     Progressive Stamping     Social History Main Topics    Smoking status: Current Every Day Smoker     Packs/day: 1.00     Years: 30.00     Types: Cigarettes    Smokeless tobacco: Never Used      Comment: smoking cessation information given at past appt    Alcohol use No    Drug use: No    Sexual activity: Yes     Partners: Female     Other Topics Concern    Not on file     Social History Narrative    No narrative on file         Family History   Problem Relation Age of Onset    Heart Disease Mother     Diabetes Mother     High Blood Pressure Mother     High Cholesterol Mother     High Blood Pressure Father     High Cholesterol Father     Cancer Paternal Grandfather      colon    Diabetes Maternal Grandmother          I have reviewed the patient's past medical history, past surgical history, allergies, medications, social and family history and I have made updates where appropriate. Review of Systems  Positive responses are highlighted in bold    Constitutional:  Fever, Chills, Night Sweats, Fatigue, Unexpected changes in weight  HENT:  Ear pain, Tinnitus, Nosebleeds, Trouble swallowing, Hearing loss  Cardiovascular:  Chest Pain, Palpitations, Orthopnea, Paroxysmal Nocturnal Dyspnea  Respiratory:  Cough, Wheezing, Shortness of breath, Chest tightness, Apnea  Gastrointestinal:  Nausea, Vomiting, Diarrhea, Constipation, Heartburn, Blood in stool  Genitourinary:  Difficulty or painful urination, Flank pain, Change in frequency, Urgency  Skin:  Color change, Rash, Itching, Wound  Musculoskeletal:  Joint pain, Back pain, Gait problems, Joint swelling, Myalgias  Neurological:  Dizziness, Headaches, Presyncope, Numbness, Seizures, Tremors  Endocrine:  Heat Intolerance, Cold Intolerance, Polydipsia, Polyphagia, Polyuria      PHYSICAL EXAM:  Vitals:    12/28/17 1601   BP: (!) 122/90   Pulse: 84   Resp: 28   Temp: 98.2 °F (36.8 °C)   Weight: 161 lb 3.2 oz (73.1 kg)   Height: 5' 5\" (1.651 m)     Body mass index is 26.83 kg/m².   Pain Score:   4 (back/shoulder)    VS Reviewed  General Appearance: A&O x 3, No acute distress,well developed and well- nourished  Head: normocephalic and atraumatic  Eyes: pupils equal, round, and reactive to light, extraocular eye movements intact, conjunctivae and eye lids without erythema  ENT: external ear and ear canal clear bilaterally, TMs diversion identified., Existing medication contract. Portia Eastman, DO)    - oxyCODONE HCl (OXY-IR) 10 MG immediate release tablet; Take 1 tablet by mouth every 6 hours as needed for Pain . Dispense: 56 tablet; Refill: 0    2. S/P shoulder surgery    As above    - oxyCODONE HCl (OXY-IR) 10 MG immediate release tablet; Take 1 tablet by mouth every 6 hours as needed for Pain . Dispense: 56 tablet; Refill: 0    3. Tear of right rotator cuff, unspecified tear extent    As above    - oxyCODONE HCl (OXY-IR) 10 MG immediate release tablet; Take 1 tablet by mouth every 6 hours as needed for Pain . Dispense: 56 tablet; Refill: 0    4. DDD (degenerative disc disease), thoracolumbar    As above    - oxyCODONE HCl (OXY-IR) 10 MG immediate release tablet; Take 1 tablet by mouth every 6 hours as needed for Pain . Dispense: 56 tablet; Refill: 0    5. Failed back syndrome    As above    - oxyCODONE HCl (OXY-IR) 10 MG immediate release tablet; Take 1 tablet by mouth every 6 hours as needed for Pain . Dispense: 56 tablet; Refill: 0    6. Cigarette nicotine dependence without complication    In precontemplation stage and not ready to quit. Declines cessation, aware of risks of continued smoking as well as resources available to help quit. These include tobacco cessation classes as well as 1-800-QUIT NOW. No barriers other than lack of desire. 3+ min spent counseling. DISPOSITION    Return in about 4 weeks (around 1/25/2018) for f/u chronic pain etc, sooner as needed. Celia Rule released without restrictions.     Future Appointments  Date Time Provider Sara Almonte   1/25/2018 4:20 PM Portia Eastman, [address] [place] [address]   2/22/2018 10:30 AM He Carrero MD Oncology [facility] - [address] [place] [address] received counseling on the following healthy behaviors: nutrition, exercise, medication adherence and tobacco cessation    Patient given educational materials on: See Attached    I have instructed

## 2017-12-28 NOTE — PROGRESS NOTES
Visit Information    Have you changed or started any medications since your last visit including any over-the-counter medicines, vitamins, or herbal medicines? no   Are you having any side effects from any of your medications? -  no  Have you stopped taking any of your medications? Is so, why? -  no    Have you seen any other physician or provider since your last visit? No  Have you had any other diagnostic tests since your last visit? No  Have you been seen in the emergency room and/or had an admission to a hospital since we last saw you? No  Have you had your routine dental cleaning in the past 6 months? no    Have you activated your [employer] account? If not, what are your barriers?  Yes     Patient Care Team:  Alfonso Gar DO as PCP - General    Medical History Review  Past Medical, Family, and Social History reviewed and does not contribute to the patient presenting condition    Health Maintenance   Topic Date Due    Pneumococcal highest risk (2 of 3 - PCV13) 12/01/2015    PSA counseling  05/10/2017    Smoker: low dose lung CT screening  08/09/2017    Flu vaccine (1) 09/01/2017    DTaP/Tdap/Td vaccine (2 - Td) 01/20/2018 (Originally 1/1/2017)    HIV screen  03/20/2018 (Originally 11/9/1975)    Lipid screen  01/30/2018    Colon cancer screen colonoscopy  03/22/2018

## 2018-01-02 ENCOUNTER — TELEPHONE (OUTPATIENT)
Dept: FAMILY MEDICINE CLINIC | Age: 58
End: 2018-01-02

## 2018-01-02 DIAGNOSIS — Z87.39 HISTORY OF SPINAL STENOSIS: Chronic | ICD-10-CM

## 2018-01-02 DIAGNOSIS — M51.35 DDD (DEGENERATIVE DISC DISEASE), THORACOLUMBAR: Chronic | ICD-10-CM

## 2018-01-02 DIAGNOSIS — J01.90 ACUTE RHINOSINUSITIS: Primary | ICD-10-CM

## 2018-01-02 DIAGNOSIS — G89.4 CHRONIC PAIN SYNDROME: Chronic | ICD-10-CM

## 2018-01-02 DIAGNOSIS — M96.1 FAILED BACK SYNDROME: Chronic | ICD-10-CM

## 2018-01-02 RX ORDER — MORPHINE SULFATE 60 MG/1
60 TABLET, FILM COATED, EXTENDED RELEASE ORAL 2 TIMES DAILY
Qty: 60 TABLET | Refills: 0 | Status: SHIPPED | OUTPATIENT
Start: 2018-01-02 | End: 2018-01-31 | Stop reason: SDUPTHER

## 2018-01-02 RX ORDER — DOXYCYCLINE HYCLATE 100 MG/1
100 CAPSULE ORAL 2 TIMES DAILY
Qty: 20 CAPSULE | Refills: 0 | Status: SHIPPED | OUTPATIENT
Start: 2018-01-02 | End: 2018-01-12

## 2018-01-02 NOTE — TELEPHONE ENCOUNTER
ASSESSMENT & PLAN  1. Acute rhinosinusitis    - doxycycline hyclate (VIBRAMYCIN) 100 MG capsule; Take 1 capsule by mouth 2 times daily for 10 days  Dispense: 20 capsule; Refill: 0    2. History of spinal stenosis    - morphine (MS CONTIN) 60 MG extended release tablet; Take 1 tablet by mouth 2 times daily for 30 days. Dispense: 60 tablet; Refill: 0    3. DDD (degenerative disc disease), thoracolumbar    - morphine (MS CONTIN) 60 MG extended release tablet; Take 1 tablet by mouth 2 times daily for 30 days. Dispense: 60 tablet; Refill: 0    4. Chronic pain syndrome    - morphine (MS CONTIN) 60 MG extended release tablet; Take 1 tablet by mouth 2 times daily for 30 days. Dispense: 60 tablet; Refill: 0    5. Failed back syndrome    - morphine (MS CONTIN) 60 MG extended release tablet; Take 1 tablet by mouth 2 times daily for 30 days. Dispense: 60 tablet; Refill: 0      OAARS reviewed and appropriate. Controlled Substances Monitoring: Attestation: The Prescription Monitoring Report for this patient was reviewed today. BHUPINDER Lopez  Documentation: Possible medication side effects, risk of tolerance and/or dependence, and alternative treatments discussed. BHUPINDER Lopez  Acute Pain Prescriptions: Prescription exceeds daily limit for a specific reason. See comments or note. BHUPINDER Lopez  Medication Contracts: Existing medication contract.  BHUPINDER Lopez      Future Appointments  Date Time Provider Sara Almonte   1/25/2018 4:20 [facility]   2/22/2018 10:30 AM Rajni Levi MD Oncology [facility] - [place] [address]

## 2018-01-02 NOTE — TELEPHONE ENCOUNTER
Pt called in requesting a refill for his morphine. Order pended. Requested Prescriptions     Pending Prescriptions Disp Refills    morphine (MS CONTIN) 60 MG extended release tablet 60 tablet 0     Sig: Take 1 tablet by mouth 2 times daily for 30 days. Pt also wanted to ask if Dr Anabel Cruz would be willing to prescribe the pt an antibiotic. Pt seen in clinic 12/28/17 and had mentioned congestion sxs to him at that time. Pt has complaints of sinus congestion and cough, sinus pressure/headache, chest tightness and L ear pain. Please advise.

## 2018-01-09 DIAGNOSIS — S46.212S BICEPS TENDON RUPTURE, LEFT, SEQUELA: ICD-10-CM

## 2018-01-09 DIAGNOSIS — M75.101 TEAR OF RIGHT ROTATOR CUFF, UNSPECIFIED TEAR EXTENT: ICD-10-CM

## 2018-01-09 DIAGNOSIS — M96.1 FAILED BACK SYNDROME: Chronic | ICD-10-CM

## 2018-01-09 DIAGNOSIS — Z98.890 S/P SHOULDER SURGERY: ICD-10-CM

## 2018-01-09 DIAGNOSIS — M51.35 DDD (DEGENERATIVE DISC DISEASE), THORACOLUMBAR: Chronic | ICD-10-CM

## 2018-01-09 RX ORDER — OXYCODONE HYDROCHLORIDE 10 MG/1
10 TABLET ORAL EVERY 6 HOURS PRN
Qty: 56 TABLET | Refills: 0 | OUTPATIENT
Start: 2018-01-09

## 2018-01-10 ENCOUNTER — TELEPHONE (OUTPATIENT)
Dept: FAMILY MEDICINE CLINIC | Age: 58
End: 2018-01-10

## 2018-01-10 DIAGNOSIS — M96.1 FAILED BACK SYNDROME: Chronic | ICD-10-CM

## 2018-01-10 DIAGNOSIS — M75.101 TEAR OF RIGHT ROTATOR CUFF, UNSPECIFIED TEAR EXTENT: ICD-10-CM

## 2018-01-10 DIAGNOSIS — S46.212S BICEPS TENDON RUPTURE, LEFT, SEQUELA: ICD-10-CM

## 2018-01-10 DIAGNOSIS — Z98.890 S/P SHOULDER SURGERY: ICD-10-CM

## 2018-01-10 DIAGNOSIS — M51.35 DDD (DEGENERATIVE DISC DISEASE), THORACOLUMBAR: Chronic | ICD-10-CM

## 2018-01-10 NOTE — TELEPHONE ENCOUNTER
It's too early  I filled 14 days worth on 12/28, so 14 days would be tomorrow. I can fill tomorrow.      Future Appointments  Date Time Provider Sara Almonte   1/25/2018 4:20 PM Deja Guzman, [address] [place] [address]   2/22/2018 10:30 AM Dutch Velez MD Oncology [facility] - [address] [place] [address]

## 2018-01-11 ENCOUNTER — TELEPHONE (OUTPATIENT)
Dept: FAMILY MEDICINE CLINIC | Age: 58
End: 2018-01-11

## 2018-01-11 DIAGNOSIS — M75.101 TEAR OF RIGHT ROTATOR CUFF, UNSPECIFIED TEAR EXTENT: ICD-10-CM

## 2018-01-11 DIAGNOSIS — M51.35 DDD (DEGENERATIVE DISC DISEASE), THORACOLUMBAR: Chronic | ICD-10-CM

## 2018-01-11 DIAGNOSIS — Z98.890 S/P SHOULDER SURGERY: Primary | ICD-10-CM

## 2018-01-11 DIAGNOSIS — M96.1 FAILED BACK SYNDROME: Chronic | ICD-10-CM

## 2018-01-11 DIAGNOSIS — S46.212S BICEPS TENDON RUPTURE, LEFT, SEQUELA: ICD-10-CM

## 2018-01-11 RX ORDER — OXYCODONE HYDROCHLORIDE 10 MG/1
10 TABLET ORAL EVERY 6 HOURS PRN
Qty: 56 TABLET | Refills: 0 | Status: SHIPPED | OUTPATIENT
Start: 2018-01-11 | End: 2018-01-25 | Stop reason: DRUGHIGH

## 2018-01-11 NOTE — TELEPHONE ENCOUNTER
Recd PA for Oxycodone 10 mg from [facility] for  [employer]. Coral called [employer] to do prior auth, and rep informed her no PA required at this time.       I called [facility] and informed pharmacist.  He will process the refill. (see attached for PA)

## 2018-01-19 DIAGNOSIS — M19.90 ARTHRITIS: Chronic | ICD-10-CM

## 2018-01-19 RX ORDER — NAPROXEN 500 MG/1
TABLET ORAL
Qty: 60 TABLET | Refills: 0 | Status: SHIPPED | OUTPATIENT
Start: 2018-01-19 | End: 2018-02-14 | Stop reason: SDUPTHER

## 2018-01-25 ENCOUNTER — OFFICE VISIT (OUTPATIENT)
Dept: FAMILY MEDICINE CLINIC | Age: 58
End: 2018-01-25
Payer: MEDICARE

## 2018-01-25 VITALS
SYSTOLIC BLOOD PRESSURE: 106 MMHG | BODY MASS INDEX: 27.49 KG/M2 | HEIGHT: 65 IN | RESPIRATION RATE: 18 BRPM | WEIGHT: 165 LBS | HEART RATE: 72 BPM | TEMPERATURE: 98.2 F | DIASTOLIC BLOOD PRESSURE: 64 MMHG

## 2018-01-25 DIAGNOSIS — Z98.890 S/P SHOULDER SURGERY: ICD-10-CM

## 2018-01-25 DIAGNOSIS — I50.42 CHRONIC COMBINED SYSTOLIC AND DIASTOLIC HEART FAILURE (HCC): Chronic | ICD-10-CM

## 2018-01-25 DIAGNOSIS — I77.89 ENLARGED THORACIC AORTA (HCC): Chronic | ICD-10-CM

## 2018-01-25 DIAGNOSIS — F17.210 CIGARETTE NICOTINE DEPENDENCE WITHOUT COMPLICATION: Chronic | ICD-10-CM

## 2018-01-25 DIAGNOSIS — B18.2 CHRONIC HEPATITIS C WITHOUT HEPATIC COMA (HCC): Chronic | ICD-10-CM

## 2018-01-25 DIAGNOSIS — I87.2 CHRONIC VENOUS INSUFFICIENCY: Chronic | ICD-10-CM

## 2018-01-25 DIAGNOSIS — M51.35 DDD (DEGENERATIVE DISC DISEASE), THORACOLUMBAR: Chronic | ICD-10-CM

## 2018-01-25 DIAGNOSIS — S46.212S BICEPS TENDON RUPTURE, LEFT, SEQUELA: Primary | ICD-10-CM

## 2018-01-25 DIAGNOSIS — J43.9 PULMONARY EMPHYSEMA, UNSPECIFIED EMPHYSEMA TYPE (HCC): Chronic | ICD-10-CM

## 2018-01-25 DIAGNOSIS — M96.1 FAILED BACK SYNDROME: Chronic | ICD-10-CM

## 2018-01-25 DIAGNOSIS — M75.101 TEAR OF RIGHT ROTATOR CUFF, UNSPECIFIED TEAR EXTENT: ICD-10-CM

## 2018-01-25 PROCEDURE — 3017F COLORECTAL CA SCREEN DOC REV: CPT | Performed by: FAMILY MEDICINE

## 2018-01-25 PROCEDURE — G8427 DOCREV CUR MEDS BY ELIG CLIN: HCPCS | Performed by: FAMILY MEDICINE

## 2018-01-25 PROCEDURE — G8484 FLU IMMUNIZE NO ADMIN: HCPCS | Performed by: FAMILY MEDICINE

## 2018-01-25 PROCEDURE — G8926 SPIRO NO PERF OR DOC: HCPCS | Performed by: FAMILY MEDICINE

## 2018-01-25 PROCEDURE — G8417 CALC BMI ABV UP PARAM F/U: HCPCS | Performed by: FAMILY MEDICINE

## 2018-01-25 PROCEDURE — 4004F PT TOBACCO SCREEN RCVD TLK: CPT | Performed by: FAMILY MEDICINE

## 2018-01-25 PROCEDURE — 3023F SPIROM DOC REV: CPT | Performed by: FAMILY MEDICINE

## 2018-01-25 PROCEDURE — 99214 OFFICE O/P EST MOD 30 MIN: CPT | Performed by: FAMILY MEDICINE

## 2018-01-25 RX ORDER — OXYCODONE HYDROCHLORIDE 5 MG/1
5 TABLET ORAL EVERY 6 HOURS PRN
Qty: 120 TABLET | Refills: 0 | Status: SHIPPED | OUTPATIENT
Start: 2018-01-25 | End: 2018-02-22 | Stop reason: SDUPTHER

## 2018-01-25 RX ORDER — OXYCODONE HYDROCHLORIDE 10 MG/1
10 TABLET ORAL EVERY 6 HOURS PRN
Qty: 56 TABLET | Refills: 0 | Status: CANCELLED | OUTPATIENT
Start: 2018-01-25 | End: 2018-02-08

## 2018-01-25 RX ORDER — NEBULIZER ACCESSORIES
KIT MISCELLANEOUS
Qty: 1 KIT | Refills: 0 | Status: SHIPPED | OUTPATIENT
Start: 2018-01-25

## 2018-01-25 NOTE — PROGRESS NOTES
f/u. Not doing OT d/t car issues. Doing therapy at home. Pain in L shoulder getting better. MS contin working well at 60mg bid and we dec his ox to 10mg q6h prn from q4h prn 2 wks ago and has done fine with this. Due for refill of ox. Has 1 wk of morphine left. Overall stable if a bit better. No bowel issues. UPDATE TODAY: doing well with weaning down on pain meds. On 60mg bid of MS contin and oxy IR 10mg q6h prn. Willing to drop to 5mg q6h prn. Feels really good pain wise.        -02. Hep C: has not f/u with Dr. Betsy Perkins. Plans to soon. No RUQ pain or jaundice      -03. CHF/leg edema: hx of, while on chemo. Seen cardio before, is to f/u at some point, he plans to scheduele. Last EF WNL. Denies CP, SOB orthopnea or PND. Does get on and off leg swelling from CVI, but better of late. Needs new rx for compression stockings       -04. Enlarated toracic aorta vs small anerysm: noted on prior CT of the chest. intiaily read as anerysm. On subsequent CT chest, was read as \"Ascending aorta is upper limits of normal to mildly enlarged, stable\", this was AUG 2016. No imaging since then. He has f/u with onc next month, he's not sure if a CT will be ordered. No CP or SOB.       -05. COPD: breathing at baseline. Needs new neb machine. No SOb or wheezing today. Still smoking.      -06. Smoking: UPDATE TODAY: has patch and gum. Has cut down to maybe 3/4 PPD. No change from last visit. Declines further intervention      Age/Gender Health Maintenance    Lipid - ;  LDL 71; HDL 65; TG 83 (JAN 2017)    Lab Results   Component Value Date    CHOL 108 01/30/2017    CHOL 179 01/09/2016    CHOL 151 01/29/2015     Lab Results   Component Value Date    TRIG 101 01/30/2017    TRIG 93 01/09/2016    TRIG 142 01/29/2015     Lab Results   Component Value Date    HDL 40 01/30/2017    HDL 77 01/09/2016    HDL 66 01/29/2015     Lab Results   Component Value Date    LDLCALC 48 01/30/2017    LDLCALC 83 01/09/2016    LDLCALC 57 01/29/2015     No Take 1 tablet by mouth every 8 hours as needed (back pain) 90 tablet 1    albuterol (PROVENTIL) (2.5 MG/3ML) 0.083% nebulizer solution USE 1 VIAL VIA NEBULIZER EVERY 4 HOURS AS NEEDED FOR WHEEZING OR SHORTNESS OF BREATH 360 mL 3    lidocaine (XYLOCAINE) 2 % jelly Apply topically as needed. 5 Tube 6    furosemide (LASIX) 20 MG tablet TAKE 1 TABLET BY MOUTH EVERY DAY AS NEEDED FOR LEG SWELLING 90 tablet 3    guaiFENesin (MUCINEX) 600 MG extended release tablet Take 1 tablet by mouth 2 times daily as needed for Congestion 60 tablet 1    metoprolol tartrate (LOPRESSOR) 25 MG tablet TAKE 1 TABLET BY MOUTH TWICE DAILY 180 tablet 3    atorvastatin (LIPITOR) 10 MG tablet TAKE 1 TABLET BY MOUTH ONCE DAILY 90 tablet 3    calcium-cholecalciferol 500-200 MG-UNIT per tablet Take 1 tablet by mouth 2 times daily 180 tablet 3    albuterol sulfate HFA (VENTOLIN HFA) 108 (90 BASE) MCG/ACT inhaler INHALE 2 PUFFS INTO THE LUNGS EVERY 6 HOURS AS NEEDED FOR WHEEZING 3 Inhaler 5    busPIRone (BUSPAR) 15 MG tablet TAKE 1 TABLET BY MOUTH EVERY 6 HOURS 360 tablet 3    nystatin (MYCOSTATIN) 908966 UNIT/ML suspension Take 5 mLs by mouth 4 times daily 400 mL 5    aspirin 81 MG EC tablet Take 1 tablet by mouth daily 30 tablet 3    omeprazole (PRILOSEC) 20 MG delayed release capsule TAKE 1 CAPSULE BY MOUTH DAILY 90 capsule 3    Misc. Devices MISC TENS UNIT ELECTRODE PADS    Use daily as needed for back pain. 4 each 3    Misc. Devices (QUAD CANE) MISC Adjustable narrow base quad cane 1 each 0    Nutritional Supplements (ENSURE) LIQD Drink 4 cans per day 120 Can 5    triamcinolone (KENALOG) 0.1 % cream Apply topically 2 times daily for up to 4 weeks, then weekends only as needed. 60 g 0    HARVONI  MG per tablet Take 1 tablet by mouth daily       fluticasone (FLONASE) 50 MCG/ACT nasal spray USE 1 SPRAY IN EACH NOSTRIL ONCE DAILY 3 Bottle 3    acetaminophen 650 MG TABS Take 650 mg by mouth every 4 hours as needed.  120 tablet rhinitis 06/08/2015    Chronic pain syndrome 04/05/2015    Insomnia 02/20/2015    History of chemotherapy 02/09/2015    Diffuse large B cell lymphoma ([address] follows with Dr Renae Harris 12/12/2014    Chronic combined systolic and diastolic heart failure ([address] 12/12/2014     EF 45-50% FEB 20152015/JUNE 2016  However, AUG 2017:  Summary   Left Ventricular size is Normal .   Normal left ventricular wall thickness.   Ejection fraction is visually estimated 55 %.  Mildly enlarged right atrium size.   Mildly enlarged right ventricle cavity.   Mild tricuspid regurgitation visualized.       DDD (degenerative disc disease), thoracolumbar 12/01/2014    Arthritis     Sleep apnea      no CPAP      GERD (gastroesophageal reflux disease)     Essential hypertension 10/20/2014    Nicotine dependence 07/16/2014    History of spinal stenosis 07/16/2014    Bilateral inguinal hernia (BIH) 09/26/2013    COPD (chronic obstructive pulmonary disease) ([address] 09/26/2013    Chronic hepatitis C without hepatic coma ([address] 09/26/2013          Past Medical History:   Diagnosis Date    Allergic conjunctivitis 6/20/2017    Allergic rhinitis 6/8/2015    Arthritis     Cervical radiculopathy 9/15/2015    Chronic combined systolic and diastolic heart failure ([address] 12/12/2014    EF 45-50% FEB 20152015/JUNE 2016    Chronic hepatitis C without hepatic coma ([address] 9/26/2013    Chronic pain syndrome 4/5/2015    Chronic venous insufficiency     COPD (chronic obstructive pulmonary disease) (HCC)     DDD (degenerative disc disease), thoracolumbar 12/1/2014    Diffuse large B cell lymphoma ([address] follows with Dr Renae Harris 12/12/2014    Enlarged thoracic aorta ([address]     Upper limits of normal size on CT chest with contrast 2016    Essential hypertension     Failed back syndrome 6/8/2015    CLIFF (generalized anxiety disorder) 8/7/2015    GERD (gastroesophageal reflux disease)     History of lymphoma     Insomnia 2/20/2015    Microscopic hematuria for Pain for up to 30 days. Dispense: 120 tablet; Refill: 0    4. DDD (degenerative disc disease), thoracolumbar    - oxyCODONE (ROXICODONE) 5 MG immediate release tablet; Take 1 tablet by mouth every 6 hours as needed for Pain for up to 30 days. Dispense: 120 tablet; Refill: 0    5. Failed back syndrome    - oxyCODONE (ROXICODONE) 5 MG immediate release tablet; Take 1 tablet by mouth every 6 hours as needed for Pain for up to 30 days. Dispense: 120 tablet; Refill: 0    6. [facility] hepatic [facility]    Discussed today  Encouraged to f/u with GI, Daisy Frame, for treatment. 7. Chronic combined systolic and diastolic heart failure (HCC)    Stable  No symptoms  con't current meds  Encouraged to f/u with cardio  Low salt diet    8. Chronic venous insufficiency    Leg elevation  con't compression stockings    - Compression Stockings MISC; by Does not apply route Bilateral compression stockings. 20-30mmHg  Dispense: 2 each; Refill: 0    9. Enlarged thoracic aorta ([facility]    Will see if inc does CT  If they have not, will order at next visit in 4 wks, CT-Angio  Needs to quit smoking, declines    10. Pulmonary emphysema, unspecified emphysema type ([facility]    Stable  Con't regimen  rx for new neb given    - Respiratory Therapy Supplies (NEBULIZER COMPRESSOR) KIT; COPD  Dispense: 1 kit; Refill: 0  - Respiratory Therapy Supplies (NEBULIZER/TUBING/MOUTHPIECE) KIT; COPD  Dispense: 1 kit; Refill: 0    11. Cigarette nicotine dependence without complication    In precontemplation stage and not ready to quit. Declines cessation, aware of risks of continued smoking as well as resources available to help quit. These include tobacco cessation classes as well as 1-800-QUIT NOW. No barriers other than lack of desire. 3+ min spent counseling.        Quality & Risk Score Accuracy - MEDICARE ADVANTAGE    Visit Dx:  Pulmonary emphysema, unspecified emphysema type ([facility]  Stable based upon symptoms and exam. Continue current

## 2018-01-25 NOTE — PATIENT INSTRUCTIONS
You may receive a survey about your visit with us today. The feedback from our patients helps us identify what is working well and where the service to all patients can be enhanced. Thank you! Patient Education        Stopping Smoking: Care Instructions  Your Care Instructions    Cigarette smokers crave the nicotine in cigarettes. Giving it up is much harder than simply changing a habit. Your body has to stop craving the nicotine. It is hard to quit, but you can do it. There are many tools that people use to quit smoking. You may find that combining tools works best for you. There are several steps to quitting. First you get ready to quit. Then you get support to help you. After that, you learn new skills and behaviors to become a nonsmoker. For many people, a necessary step is getting and using medicine. Your doctor will help you set up the plan that best meets your needs. You may want to attend a smoking cessation program to help you quit smoking. When you choose a program, look for one that has proven success. Ask your doctor for ideas. You will greatly increase your chances of success if you take medicine as well as get counseling or join a cessation program.  Some of the changes you feel when you first quit tobacco are uncomfortable. Your body will miss the nicotine at first, and you may feel short-tempered and grumpy. You may have trouble sleeping or concentrating. Medicine can help you deal with these symptoms. You may struggle with changing your smoking habits and rituals. The last step is the tricky one: Be prepared for the smoking urge to continue for a time. This is a lot to deal with, but keep at it. You will feel better. Follow-up care is a key part of your treatment and safety. Be sure to make and go to all appointments, and call your doctor if you are having problems. It's also a good idea to know your test results and keep a list of the medicines you take.   How can you care for yourself at

## 2018-01-31 DIAGNOSIS — M51.35 DDD (DEGENERATIVE DISC DISEASE), THORACOLUMBAR: Chronic | ICD-10-CM

## 2018-01-31 DIAGNOSIS — G89.4 CHRONIC PAIN SYNDROME: Chronic | ICD-10-CM

## 2018-01-31 DIAGNOSIS — Z87.39 HISTORY OF SPINAL STENOSIS: Chronic | ICD-10-CM

## 2018-01-31 DIAGNOSIS — M96.1 FAILED BACK SYNDROME: Chronic | ICD-10-CM

## 2018-01-31 RX ORDER — MORPHINE SULFATE 60 MG/1
60 TABLET, FILM COATED, EXTENDED RELEASE ORAL 2 TIMES DAILY
Qty: 60 TABLET | Refills: 0 | Status: SHIPPED | OUTPATIENT
Start: 2018-01-31 | End: 2018-02-28 | Stop reason: SDUPTHER

## 2018-01-31 NOTE — TELEPHONE ENCOUNTER
ASSESSMENT & PLAN  1. History of spinal stenosis    - morphine (MS CONTIN) 60 MG extended release tablet; Take 1 tablet by mouth 2 times daily for 30 days. Dispense: 60 tablet; Refill: 0    2. DDD (degenerative disc disease), thoracolumbar    - morphine (MS CONTIN) 60 MG extended release tablet; Take 1 tablet by mouth 2 times daily for 30 days. Dispense: 60 tablet; Refill: 0    3. Chronic pain syndrome    - morphine (MS CONTIN) 60 MG extended release tablet; Take 1 tablet by mouth 2 times daily for 30 days. Dispense: 60 tablet; Refill: 0    4. Failed back syndrome    - morphine (MS CONTIN) 60 MG extended release tablet; Take 1 tablet by mouth 2 times daily for 30 days. Dispense: 60 tablet; Refill: 0      OAARS reviewed and appropriate. Controlled Substances Monitoring: Attestation: The Prescription Monitoring Report for this patient was reviewed today. Colton Francis DO)  Documentation: Possible medication side effects, risk of tolerance and/or dependence, and alternative treatments discussed., No signs of potential drug abuse or diversion identified. Colton Francis DO)  Chronic Pain: Dose reduction has been attempted. Colton Francis DO)  Medication Contracts: Existing medication contract.  Colton Francis DO)      Future Appointments  Date Time Provider Sara Almonte   2/22/2018 10:30 AM Lm Gabriel MD Oncology [facility] - [place] [address] [address]   2/26/2018 10:40 AM Colton Francis, [address] [place] [address]

## 2018-02-06 ENCOUNTER — TELEPHONE (OUTPATIENT)
Dept: FAMILY MEDICINE CLINIC | Age: 58
End: 2018-02-06

## 2018-02-06 NOTE — TELEPHONE ENCOUNTER
Spoke with pt. He states that he started taking them that way a couple days ago. The 5mg tablets are not effective on their own.

## 2018-02-11 DIAGNOSIS — G44.219 EPISODIC TENSION-TYPE HEADACHE, NOT INTRACTABLE: ICD-10-CM

## 2018-02-12 ENCOUNTER — TELEPHONE (OUTPATIENT)
Dept: FAMILY MEDICINE CLINIC | Age: 58
End: 2018-02-12

## 2018-02-12 DIAGNOSIS — B18.2 CHRONIC HEPATITIS C WITHOUT HEPATIC COMA (HCC): Chronic | ICD-10-CM

## 2018-02-12 DIAGNOSIS — E44.1 MILD PROTEIN-CALORIE MALNUTRITION (HCC): Primary | ICD-10-CM

## 2018-02-12 DIAGNOSIS — Z92.21 HISTORY OF CHEMOTHERAPY: ICD-10-CM

## 2018-02-12 DIAGNOSIS — J44.9 CHRONIC OBSTRUCTIVE PULMONARY DISEASE, UNSPECIFIED COPD TYPE (HCC): Chronic | ICD-10-CM

## 2018-02-12 DIAGNOSIS — D63.0 ANEMIA IN NEOPLASTIC DISEASE: ICD-10-CM

## 2018-02-12 DIAGNOSIS — I50.22 CHRONIC SYSTOLIC CHF (CONGESTIVE HEART FAILURE) (HCC): Chronic | ICD-10-CM

## 2018-02-12 RX ORDER — BUTALBITAL, ACETAMINOPHEN AND CAFFEINE 50; 325; 40 MG/1; MG/1; MG/1
TABLET ORAL
Qty: 60 TABLET | Refills: 0 | Status: SHIPPED | OUTPATIENT
Start: 2018-02-12 | End: 2018-09-03 | Stop reason: SDUPTHER

## 2018-02-12 RX ORDER — LACTOSE-REDUCED FOOD
LIQUID (ML) ORAL
Qty: 60 CAN | Refills: 5 | Status: SHIPPED | OUTPATIENT
Start: 2018-02-12

## 2018-02-12 NOTE — TELEPHONE ENCOUNTER
Pt called stating he had previously been on Ensure & is requesting a script be sent to [facility] for low calorie Ensure (200 ronald). Pt states he was previously taking 4 per day but would like to drop that to only two per day & he states the flavor makes no difference to him. Please advise.

## 2018-02-13 ENCOUNTER — TELEPHONE (OUTPATIENT)
Dept: FAMILY MEDICINE CLINIC | Age: 58
End: 2018-02-13

## 2018-02-14 DIAGNOSIS — M19.90 ARTHRITIS: Chronic | ICD-10-CM

## 2018-02-14 RX ORDER — NAPROXEN 500 MG/1
TABLET ORAL
Qty: 60 TABLET | Refills: 2 | Status: SHIPPED | OUTPATIENT
Start: 2018-02-14 | End: 2018-05-12 | Stop reason: SDUPTHER

## 2018-02-21 ENCOUNTER — TELEPHONE (OUTPATIENT)
Dept: FAMILY MEDICINE CLINIC | Age: 58
End: 2018-02-21

## 2018-02-21 DIAGNOSIS — M51.35 DDD (DEGENERATIVE DISC DISEASE), THORACOLUMBAR: Chronic | ICD-10-CM

## 2018-02-21 DIAGNOSIS — M96.1 FAILED BACK SYNDROME: Chronic | ICD-10-CM

## 2018-02-21 NOTE — TELEPHONE ENCOUNTER
Have pt check with some other pharmacies and see if they have it and we can then send it there. Thanks!

## 2018-02-21 NOTE — TELEPHONE ENCOUNTER
Received a fax from [facility] stating the Lidocaine 2% topical jelly is on backorder till 3/5/18, this is not a guarantied date. [facility] is asking for a substitute rx till the lidocaine comes in. Fax is attached to phone encounter for review. Please advise.

## 2018-02-22 ENCOUNTER — HOSPITAL ENCOUNTER (OUTPATIENT)
Dept: INFUSION THERAPY | Age: 58
Discharge: HOME | End: 2018-02-22
Payer: MEDICARE

## 2018-02-22 ENCOUNTER — OFFICE VISIT (OUTPATIENT)
Dept: ONCOLOGY | Age: 58
End: 2018-02-22
Payer: MEDICARE

## 2018-02-22 VITALS
TEMPERATURE: 97.5 F | WEIGHT: 163.8 LBS | HEIGHT: 65 IN | DIASTOLIC BLOOD PRESSURE: 94 MMHG | SYSTOLIC BLOOD PRESSURE: 133 MMHG | RESPIRATION RATE: 18 BRPM | BODY MASS INDEX: 27.29 KG/M2 | HEART RATE: 84 BPM | OXYGEN SATURATION: 94 %

## 2018-02-22 DIAGNOSIS — C83.33 DIFFUSE LARGE B-CELL LYMPHOMA OF INTRA-ABDOMINAL LYMPH NODES (HCC): ICD-10-CM

## 2018-02-22 DIAGNOSIS — Z85.79 HISTORY OF LYMPHOMA: Primary | Chronic | ICD-10-CM

## 2018-02-22 DIAGNOSIS — M75.101 TEAR OF RIGHT ROTATOR CUFF, UNSPECIFIED TEAR EXTENT: ICD-10-CM

## 2018-02-22 DIAGNOSIS — Z98.890 S/P SHOULDER SURGERY: ICD-10-CM

## 2018-02-22 DIAGNOSIS — G89.4 CHRONIC PAIN SYNDROME: Chronic | ICD-10-CM

## 2018-02-22 DIAGNOSIS — M51.35 DDD (DEGENERATIVE DISC DISEASE), THORACOLUMBAR: Chronic | ICD-10-CM

## 2018-02-22 DIAGNOSIS — M96.1 FAILED BACK SYNDROME: Chronic | ICD-10-CM

## 2018-02-22 DIAGNOSIS — B18.2 CHRONIC HEPATITIS C WITHOUT HEPATIC COMA (HCC): Chronic | ICD-10-CM

## 2018-02-22 DIAGNOSIS — S46.212S BICEPS TENDON RUPTURE, LEFT, SEQUELA: Primary | ICD-10-CM

## 2018-02-22 DIAGNOSIS — D64.9 CHRONIC ANEMIA: ICD-10-CM

## 2018-02-22 LAB
ALBUMIN SERPL-MCNC: 3.8 G/DL (ref 3.5–5.1)
ALP BLD-CCNC: 115 U/L (ref 38–126)
ALT SERPL-CCNC: 34 U/L (ref 11–66)
AST SERPL-CCNC: 40 U/L (ref 5–40)
BASINOPHIL, AUTOMATED: 0 % (ref 0–12)
BILIRUB SERPL-MCNC: 0.7 MG/DL (ref 0.3–1.2)
BILIRUBIN DIRECT: < 0.2 MG/DL (ref 0–0.3)
BUN, WHOLE BLOOD: 16 MG/DL (ref 8–26)
CHLORIDE, WHOLE BLOOD: 105 MEQ/L (ref 98–109)
CREATININE, WHOLE BLOOD: 0.9 MG/DL (ref 0.5–1.2)
EOSINOPHILS RELATIVE PERCENT: 4 % (ref 0–12)
GFR, ESTIMATED: > 90 ML/MIN/1.73M2
GLUCOSE, WHOLE BLOOD: 103 MG/DL (ref 70–108)
HCT VFR BLD CALC: 37 % (ref 42–52)
HEMOGLOBIN: 11.8 GM/DL (ref 14–18)
IONIZED CALCIUM, WHOLE BLOOD: 1.16 MMOL/L (ref 1.12–1.32)
LYMPHOCYTES # BLD: 22 % (ref 15–47)
MCH RBC QN AUTO: 28 PG (ref 27–31)
MCHC RBC AUTO-ENTMCNC: 32.1 GM/DL (ref 33–37)
MCV RBC AUTO: 87 FL (ref 80–94)
MONOCYTES: 9 % (ref 0–12)
PDW BLD-RTO: 14.7 % (ref 11.5–14.5)
PLATELET # BLD: 132 THOU/MM3 (ref 130–400)
PMV BLD AUTO: 9.1 FL (ref 7.4–10.4)
POTASSIUM, WHOLE BLOOD: 4.3 MEQ/L (ref 3.5–4.9)
RBC # BLD: 4.23 MILL/MM3 (ref 4.7–6.1)
SEG NEUTROPHILS: 65 % (ref 43–75)
SODIUM, WHOLE BLOOD: 139 MEQ/L (ref 138–146)
TOTAL CO2, WHOLE BLOOD: 26 MEQ/L (ref 23–33)
TOTAL PROTEIN: 6.8 G/DL (ref 6.1–8)
WBC # BLD: 6.6 THOU/MM3 (ref 4.8–10.8)

## 2018-02-22 PROCEDURE — G8427 DOCREV CUR MEDS BY ELIG CLIN: HCPCS | Performed by: INTERNAL MEDICINE

## 2018-02-22 PROCEDURE — 99211 OFF/OP EST MAY X REQ PHY/QHP: CPT

## 2018-02-22 PROCEDURE — 4004F PT TOBACCO SCREEN RCVD TLK: CPT | Performed by: INTERNAL MEDICINE

## 2018-02-22 PROCEDURE — 80047 BASIC METABLC PNL IONIZED CA: CPT

## 2018-02-22 PROCEDURE — G8484 FLU IMMUNIZE NO ADMIN: HCPCS | Performed by: INTERNAL MEDICINE

## 2018-02-22 PROCEDURE — 99214 OFFICE O/P EST MOD 30 MIN: CPT | Performed by: INTERNAL MEDICINE

## 2018-02-22 PROCEDURE — 85025 COMPLETE CBC W/AUTO DIFF WBC: CPT

## 2018-02-22 PROCEDURE — 36415 COLL VENOUS BLD VENIPUNCTURE: CPT

## 2018-02-22 PROCEDURE — 80076 HEPATIC FUNCTION PANEL: CPT

## 2018-02-22 PROCEDURE — G8417 CALC BMI ABV UP PARAM F/U: HCPCS | Performed by: INTERNAL MEDICINE

## 2018-02-22 PROCEDURE — 3017F COLORECTAL CA SCREEN DOC REV: CPT | Performed by: INTERNAL MEDICINE

## 2018-02-22 RX ORDER — OXYCODONE HYDROCHLORIDE 5 MG/1
5 TABLET ORAL EVERY 6 HOURS PRN
Qty: 120 TABLET | Refills: 0 | Status: SHIPPED | OUTPATIENT
Start: 2018-02-22 | End: 2018-03-21 | Stop reason: SDUPTHER

## 2018-02-26 ENCOUNTER — OFFICE VISIT (OUTPATIENT)
Dept: FAMILY MEDICINE CLINIC | Age: 58
End: 2018-02-26
Payer: MEDICARE

## 2018-02-26 ENCOUNTER — TELEPHONE (OUTPATIENT)
Dept: FAMILY MEDICINE CLINIC | Age: 58
End: 2018-02-26

## 2018-02-26 VITALS
SYSTOLIC BLOOD PRESSURE: 124 MMHG | TEMPERATURE: 98.5 F | WEIGHT: 167.8 LBS | HEIGHT: 64 IN | HEART RATE: 85 BPM | RESPIRATION RATE: 12 BRPM | BODY MASS INDEX: 28.65 KG/M2 | DIASTOLIC BLOOD PRESSURE: 78 MMHG

## 2018-02-26 DIAGNOSIS — G89.4 CHRONIC PAIN SYNDROME: Chronic | ICD-10-CM

## 2018-02-26 DIAGNOSIS — Z12.5 SPECIAL SCREENING FOR MALIGNANT NEOPLASM OF PROSTATE: ICD-10-CM

## 2018-02-26 DIAGNOSIS — Z98.890 S/P SHOULDER SURGERY: ICD-10-CM

## 2018-02-26 DIAGNOSIS — F17.210 CIGARETTE NICOTINE DEPENDENCE WITHOUT COMPLICATION: Chronic | ICD-10-CM

## 2018-02-26 DIAGNOSIS — J30.89 CHRONIC NONSEASONAL ALLERGIC RHINITIS DUE TO POLLEN: Primary | ICD-10-CM

## 2018-02-26 DIAGNOSIS — M96.1 FAILED BACK SYNDROME: Chronic | ICD-10-CM

## 2018-02-26 DIAGNOSIS — E78.5 DYSLIPIDEMIA: Chronic | ICD-10-CM

## 2018-02-26 DIAGNOSIS — S46.212S RUPTURE OF LEFT DISTAL BICEPS TENDON, SEQUELA: ICD-10-CM

## 2018-02-26 DIAGNOSIS — M51.35 DDD (DEGENERATIVE DISC DISEASE), THORACOLUMBAR: Primary | Chronic | ICD-10-CM

## 2018-02-26 DIAGNOSIS — I77.89 ENLARGED THORACIC AORTA (HCC): ICD-10-CM

## 2018-02-26 PROCEDURE — 99214 OFFICE O/P EST MOD 30 MIN: CPT | Performed by: FAMILY MEDICINE

## 2018-02-26 PROCEDURE — 4004F PT TOBACCO SCREEN RCVD TLK: CPT | Performed by: FAMILY MEDICINE

## 2018-02-26 PROCEDURE — 3017F COLORECTAL CA SCREEN DOC REV: CPT | Performed by: FAMILY MEDICINE

## 2018-02-26 PROCEDURE — G8484 FLU IMMUNIZE NO ADMIN: HCPCS | Performed by: FAMILY MEDICINE

## 2018-02-26 PROCEDURE — G8427 DOCREV CUR MEDS BY ELIG CLIN: HCPCS | Performed by: FAMILY MEDICINE

## 2018-02-26 PROCEDURE — G8417 CALC BMI ABV UP PARAM F/U: HCPCS | Performed by: FAMILY MEDICINE

## 2018-02-26 RX ORDER — FLUTICASONE PROPIONATE 50 MCG
SPRAY, SUSPENSION (ML) NASAL
Qty: 3 BOTTLE | Refills: 3 | Status: SHIPPED | OUTPATIENT
Start: 2018-02-26 | End: 2019-01-01 | Stop reason: SDUPTHER

## 2018-02-26 RX ORDER — FLUTICASONE PROPIONATE 50 MCG
SPRAY, SUSPENSION (ML) NASAL
Qty: 3 BOTTLE | Refills: 3 | Status: CANCELLED | OUTPATIENT
Start: 2018-02-26

## 2018-02-26 NOTE — PATIENT INSTRUCTIONS
LAB INSTRUCTIONS:    Please complete labs within 6 week(s). Please fast for 8 hours prior to lab collection. The clinic will call you within 1 week of collection. If you have not heard from us within that amount of time, please call us at [phone]. Patient Education        Stopping Smoking: Care Instructions  Your Care Instructions    Cigarette smokers crave the nicotine in cigarettes. Giving it up is much harder than simply changing a habit. Your body has to stop craving the nicotine. It is hard to quit, but you can do it. There are many tools that people use to quit smoking. You may find that combining tools works best for you. There are several steps to quitting. First you get ready to quit. Then you get support to help you. After that, you learn new skills and behaviors to become a nonsmoker. For many people, a necessary step is getting and using medicine. Your doctor will help you set up the plan that best meets your needs. You may want to attend a smoking cessation program to help you quit smoking. When you choose a program, look for one that has proven success. Ask your doctor for ideas. You will greatly increase your chances of success if you take medicine as well as get counseling or join a cessation program.  Some of the changes you feel when you first quit tobacco are uncomfortable. Your body will miss the nicotine at first, and you may feel short-tempered and grumpy. You may have trouble sleeping or concentrating. Medicine can help you deal with these symptoms. You may struggle with changing your smoking habits and rituals. The last step is the tricky one: Be prepared for the smoking urge to continue for a time. This is a lot to deal with, but keep at it. You will feel better. Follow-up care is a key part of your treatment and safety. Be sure to make and go to all appointments, and call your doctor if you are having problems.  It's also a good idea to know your test results and keep a list

## 2018-02-26 NOTE — PROGRESS NOTES
getting better. MS contin working well at 60mg bid and we dec his ox to 10mg q6h prn from q4h prn 2 wks ago and has done fine with this. Due for refill of ox. Has 1 wk of morphine left. Overall stable if a bit better. No bowel issues. UPDATE LAST VISIT: doing well with weaning down on pain meds. On 60mg bid of MS contin and oxy IR 10mg q6h prn. Willing to drop to 5mg q6h prn. Feels really good pain wise. UPDATE TODAY: pain stable. MS contin working fine at present dose. 30mg not nearly as effective. Doing ok with dropping oxy IR to 5m q6h prn from 10. In more pain than prior, but is tolerable. Doesn't want to adjust further at this time.        -02. HLD:    HPI: due for labs. Taking meds as prescribed ?: yes  Tolerating well ?: yes  Side Effects ?: denies  Muscle Pain?: denies  Working on TLCS ?: yes      -03. Enlarated toracic aorta vs small anerysm LAST VISIT: noted on prior CT of the chest. intiaily read as anerysm. On subsequent CT chest, was read as \"Ascending aorta is upper limits of normal to mildly enlarged, stable\", this was AUG 2016. No imaging since then. He has f/u with onc next month, he's not sure if a CT will be ordered. No CP or SOB. UPDATE TODAY: seen by onc. They did not order a CT of chest. No CP/SOB.        -04. Smoking: UPDATE TODAY: has patch and gum. Has cut down to maybe 3/4 PPD. No change from last visit. Declines further intervention      Age/Gender Health Maintenance    Lipid - ;  LDL 71; HDL 65; TG 83 (JAN 2017)    Lab Results   Component Value Date    CHOL 108 01/30/2017    CHOL 179 01/09/2016    CHOL 151 01/29/2015     Lab Results   Component Value Date    TRIG 101 01/30/2017    TRIG 93 01/09/2016    TRIG 142 01/29/2015     Lab Results   Component Value Date    HDL 40 01/30/2017    HDL 77 01/09/2016    HDL 66 01/29/2015     Lab Results   Component Value Date    LDLCALC 48 01/30/2017    LDLCALC 83 01/09/2016    LDLCALC 57 01/29/2015     No results found for: 20-30mmHg 2 each 0    HARVONI  MG per tablet Take 1 tablet by mouth daily        No current facility-administered medications for this visit. Facility-Administered Medications Ordered in Other Visits   Medication Dose Route Frequency Provider Last Rate Last Dose    Absorbable Collagen Hemostat MISC 0.5 g  0.5 g Intra-Lesional Once Yohana Seay MD         No orders of the defined types were placed in this encounter. All medications reviewed and reconciled, including OTC and herbal medications. Updated list given to patient. Patient Active Problem List    Diagnosis Date Noted    Dyslipidemia     S/P shoulder surgery 12/07/2017    Biceps tendon rupture, left, sequela 11/02/2017    Tear of right rotator cuff 09/25/2017    Chronic venous insufficiency     Allergic conjunctivitis 06/20/2017    Microscopic hematuria 05/31/2017     Following with urology now      Rupture of distal biceps tendon 05/08/2017    Osteopenia     History of lymphoma     Chronic pain of right knee 01/20/2017    Bilateral edema of lower extremity 06/23/2016    Enlarged thoracic aorta ([address]      Upper limits of normal size on CT chest with contrast 2016      Ventral hernia without obstruction or gangrene     Pulmonary nodules 09/15/2015     Being monitored by oncology      Cervical radiculopathy 09/15/2015    CLIFF (generalized anxiety disorder) 08/07/2015    Failed back syndrome 06/08/2015    Allergic rhinitis 06/08/2015    Chronic pain syndrome 04/05/2015    Insomnia 02/20/2015    History of chemotherapy 02/09/2015    Diffuse large B cell lymphoma ([address] follows with Dr Ahsan Gotti 12/12/2014    Chronic combined systolic and diastolic heart failure ([address] 12/12/2014     EF 45-50% FEB 2015/JUNE 2016  However, AUG 2017:  Summary   Left Ventricular size is Normal .   Normal left ventricular wall thickness.   Ejection fraction is visually estimated 55 %.    Mildly enlarged right atrium size.  Mary Anne Sgeura frequency, Urgency  Skin:  Color change, Rash, Itching, Wound  Musculoskeletal:  Joint pain, Back pain, Gait problems, Joint swelling, Myalgias  Neurological:  Dizziness, Headaches, Presyncope, Numbness, Seizures, Tremors  Endocrine:  Heat Intolerance, Cold Intolerance, Polydipsia, Polyphagia, Polyuria      PHYSICAL EXAM:  Vitals:    02/26/18 1032   BP: 124/78   Pulse: 85   Resp: 12   Temp: 98.5 °F (36.9 °C)   TempSrc: Oral   Weight: 167 lb 12.8 oz (76.1 kg)   Height: 5' 4\" (1.626 m)     Body mass index is 28.8 kg/m². Pain Score:   4 (back/shoulder)    VS Reviewed  General Appearance: A&O x 3, No acute distress,well developed and well- nourished  Head: normocephalic and atraumatic  Eyes: pupils equal, round, and reactive to light, extraocular eye movements intact, conjunctivae and eye lids without erythema  ENT: external ear and ear canal clear bilaterally, TMs intact and regular, nose without deformity, nasal mucosa and turbinates normal without polyps, oropharynx normal, dentition is normal for age  Neck: supple and non-tender without mass, no thyromegaly or thyroid nodules, no cervical lymphadenopathy  Pulmonary/Chest: distant with good air movement bilaterally- no wheezing throughout, no rales or rhonchi, no respiratory distress or retractions  Cardiovascular: Distant, S1 and S2 auscultated w/ RRR. No murmurs, rubs, clicks, or gallops, distal pulses intact. Abdomen: soft, non-tender, non-distended, bowl sounds physiologic,  no rebound or guarding, no masses noted or obvious hernia or defect noted. Liver and spleen without enlargement. Extremities: no cyanosis, clubbing  of the lower extremities. +trace bilat ankle edema. +2 PT/DP bilaterally. Neg homans bilat. No calf edema or TTP. Musculoskeletal: No joint swelling or gross deformity   Skin: warm and dry, no rash or erythema. L upper ARM: out of sling. Incision healing well. No drainage.        ASSESSMENT & PLAN  1. DDD (degenerative disc disease), thoracolumbar    Chronic pain con't be an issue between shoulder and failed back  However, overall doing better yet  MS contint at 60mg bid doing better than 30mg bid, will con't this  con't oxy IR at 5mg q6h prn for now. Hoping to q8h prn. Also hoping to dec MS contin over time as well. I don't think I'll ever get him at [de-identified] or lower MEQ, but will con't to try  May call for MS contin when due later this wk. Will see back on 25 MARCH and fill enough MS contin until then so that Oxy and MScontin can be on same schedule. 2. Failed back syndrome    As above    3. Chronic pain syndrome    As above    4. Rupture of left distal biceps tendon, sequela    As above    5. S/P shoulder surgery    As above    6. Dyslipidemia    con't lipitor  Labs ordered    - Lipid Panel; Future    7. Enlarged thoracic aorta ([address]    Overdue for f/u CT chest to ensure area has not enlarged and become aneurysmal. He is at high risk  Needs to quit smoking, declines    - CTA CHEST W WO CONTRAST; Future    8. Cigarette nicotine dependence without complication    In precontemplation stage and not ready to quit. Declines cessation, aware of risks of continued smoking as well as resources available to help quit. These include tobacco cessation classes as well as 1-800-QUIT NOW. No barriers other than lack of desire. 3+ min spent counseling. 9. Special screening for malignant neoplasm of prostate    Discussed prostate screening guidelines and with shared decision making, he elects to proceed with psa    - Psa screening; Future      DISPOSITION    Return in 3 weeks (on 3/22/2018) for f/u chronic pain, sooner as needed. Nani Ya released without restrictions.     Future Appointments  Date Time Provider Sara Almonte   3/21/2018 1:20 PM Yasmeen Slade DO [facility] [place]   8/23/2018 11:15 AM Ene Norwood MD Oncology [facility] - [address] [facility] received counseling on the following healthy behaviors:

## 2018-02-28 DIAGNOSIS — M51.35 DDD (DEGENERATIVE DISC DISEASE), THORACOLUMBAR: Chronic | ICD-10-CM

## 2018-02-28 DIAGNOSIS — Z87.39 HISTORY OF SPINAL STENOSIS: Primary | Chronic | ICD-10-CM

## 2018-02-28 DIAGNOSIS — M96.1 FAILED BACK SYNDROME: Chronic | ICD-10-CM

## 2018-02-28 DIAGNOSIS — G89.4 CHRONIC PAIN SYNDROME: Chronic | ICD-10-CM

## 2018-02-28 RX ORDER — MORPHINE SULFATE 60 MG/1
60 TABLET, FILM COATED, EXTENDED RELEASE ORAL 2 TIMES DAILY
Qty: 42 TABLET | Refills: 0 | Status: SHIPPED | OUTPATIENT
Start: 2018-02-28 | End: 2018-03-21 | Stop reason: SDUPTHER

## 2018-02-28 NOTE — TELEPHONE ENCOUNTER
ASSESSMENT & PLAN  1. History of spinal stenosis    - morphine (MS CONTIN) 60 MG extended release tablet; Take 1 tablet by mouth 2 times daily for 21 days. Dispense: 42 tablet; Refill: 0    2. DDD (degenerative disc disease), thoracolumbar    - morphine (MS CONTIN) 60 MG extended release tablet; Take 1 tablet by mouth 2 times daily for 21 days. Dispense: 42 tablet; Refill: 0    3. Chronic pain syndrome    - morphine (MS CONTIN) 60 MG extended release tablet; Take 1 tablet by mouth 2 times daily for 21 days. Dispense: 42 tablet; Refill: 0    4. Failed back syndrome    - morphine (MS CONTIN) 60 MG extended release tablet; Take 1 tablet by mouth 2 times daily for 21 days. Dispense: 42 tablet; Refill: 0      OAARS reviewed and appropriate. Controlled Substances Monitoring: Attestation: The Prescription Monitoring Report for this patient was reviewed today. Wing Jaye DO)  Documentation: Possible medication side effects, risk of tolerance/dependence & alternative treatments discussed., No signs of potential drug abuse or diversion identified. Wing Jaye DO)  Medication Contracts: Existing medication contract.  Wing Jaye DO)      Future Appointments  Date Time Provider Sara Almonte   3/7/2018 3:20 PM STR CT IMAGING RM1  [facility] EXP STR Radiolog   3/21/2018 1:20 PM Wing Jaye DO [facility] [place]   8/23/2018 11:15 AM Lauren Lay MD Oncology [facility] - [place]

## 2018-03-01 DIAGNOSIS — K59.03 DRUG-INDUCED CONSTIPATION: ICD-10-CM

## 2018-03-01 RX ORDER — POLYETHYLENE GLYCOL 3350 17 G/17G
POWDER, FOR SOLUTION ORAL
Qty: 527 G | Refills: 0 | Status: SHIPPED | OUTPATIENT
Start: 2018-03-01 | End: 2019-01-02 | Stop reason: SDUPTHER

## 2018-03-07 ENCOUNTER — HOSPITAL ENCOUNTER (OUTPATIENT)
Dept: CT IMAGING | Age: 58
Discharge: HOME | End: 2018-03-07
Payer: MEDICARE

## 2018-03-07 DIAGNOSIS — I77.89 ENLARGED THORACIC AORTA (HCC): ICD-10-CM

## 2018-03-07 PROCEDURE — 6360000004 HC RX CONTRAST MEDICATION: Performed by: FAMILY MEDICINE

## 2018-03-07 PROCEDURE — 71275 CT ANGIOGRAPHY CHEST: CPT

## 2018-03-07 RX ADMIN — IOPAMIDOL 90 ML: 755 INJECTION, SOLUTION INTRAVENOUS at 15:36

## 2018-03-09 ENCOUNTER — TELEPHONE (OUTPATIENT)
Dept: FAMILY MEDICINE CLINIC | Age: 58
End: 2018-03-09

## 2018-03-21 ENCOUNTER — TELEPHONE (OUTPATIENT)
Dept: FAMILY MEDICINE CLINIC | Age: 58
End: 2018-03-21

## 2018-03-21 ENCOUNTER — OFFICE VISIT (OUTPATIENT)
Dept: FAMILY MEDICINE CLINIC | Age: 58
End: 2018-03-21
Payer: MEDICARE

## 2018-03-21 VITALS
DIASTOLIC BLOOD PRESSURE: 68 MMHG | WEIGHT: 174 LBS | TEMPERATURE: 98.3 F | HEART RATE: 74 BPM | HEIGHT: 64 IN | RESPIRATION RATE: 16 BRPM | SYSTOLIC BLOOD PRESSURE: 118 MMHG | BODY MASS INDEX: 29.71 KG/M2

## 2018-03-21 DIAGNOSIS — Z98.890 S/P SHOULDER SURGERY: ICD-10-CM

## 2018-03-21 DIAGNOSIS — Z87.39 HISTORY OF SPINAL STENOSIS: Chronic | ICD-10-CM

## 2018-03-21 DIAGNOSIS — G89.4 CHRONIC PAIN SYNDROME: Chronic | ICD-10-CM

## 2018-03-21 DIAGNOSIS — M51.35 DDD (DEGENERATIVE DISC DISEASE), THORACOLUMBAR: Primary | Chronic | ICD-10-CM

## 2018-03-21 DIAGNOSIS — M75.101 TEAR OF RIGHT ROTATOR CUFF, UNSPECIFIED TEAR EXTENT: ICD-10-CM

## 2018-03-21 DIAGNOSIS — S46.212S BICEPS TENDON RUPTURE, LEFT, SEQUELA: ICD-10-CM

## 2018-03-21 DIAGNOSIS — F17.210 CIGARETTE NICOTINE DEPENDENCE WITHOUT COMPLICATION: Chronic | ICD-10-CM

## 2018-03-21 DIAGNOSIS — M96.1 FAILED BACK SYNDROME: Chronic | ICD-10-CM

## 2018-03-21 DIAGNOSIS — I77.89 ENLARGED THORACIC AORTA (HCC): Chronic | ICD-10-CM

## 2018-03-21 LAB
CHOLESTEROL, TOTAL: 159 MG/DL
CHOLESTEROL/HDL RATIO: NORMAL
HDLC SERPL-MCNC: 69 MG/DL (ref 35–70)
LDL CHOLESTEROL CALCULATED: 77 MG/DL (ref 0–160)
TRIGL SERPL-MCNC: 65 MG/DL
VLDLC SERPL CALC-MCNC: 13 MG/DL

## 2018-03-21 PROCEDURE — G8427 DOCREV CUR MEDS BY ELIG CLIN: HCPCS | Performed by: FAMILY MEDICINE

## 2018-03-21 PROCEDURE — 3017F COLORECTAL CA SCREEN DOC REV: CPT | Performed by: FAMILY MEDICINE

## 2018-03-21 PROCEDURE — G8417 CALC BMI ABV UP PARAM F/U: HCPCS | Performed by: FAMILY MEDICINE

## 2018-03-21 PROCEDURE — 99214 OFFICE O/P EST MOD 30 MIN: CPT | Performed by: FAMILY MEDICINE

## 2018-03-21 PROCEDURE — G8484 FLU IMMUNIZE NO ADMIN: HCPCS | Performed by: FAMILY MEDICINE

## 2018-03-21 PROCEDURE — 4004F PT TOBACCO SCREEN RCVD TLK: CPT | Performed by: FAMILY MEDICINE

## 2018-03-21 RX ORDER — MORPHINE SULFATE 60 MG/1
60 TABLET, FILM COATED, EXTENDED RELEASE ORAL 2 TIMES DAILY
Qty: 60 TABLET | Refills: 0 | Status: SHIPPED | OUTPATIENT
Start: 2018-03-21 | End: 2018-04-18 | Stop reason: SDUPTHER

## 2018-03-21 RX ORDER — OXYCODONE HYDROCHLORIDE 5 MG/1
5 TABLET ORAL EVERY 4 HOURS PRN
Qty: 180 TABLET | Refills: 0 | Status: SHIPPED | OUTPATIENT
Start: 2018-03-21 | End: 2018-04-18 | Stop reason: SDUPTHER

## 2018-03-21 NOTE — PROGRESS NOTES
TRIG 93 01/09/2016    TRIG 142 01/29/2015     Lab Results   Component Value Date    HDL 40 01/30/2017    HDL 77 01/09/2016    HDL 66 01/29/2015     Lab Results   Component Value Date    LDLCALC 48 01/30/2017    LDLCALC 83 01/09/2016    LDLCALC 57 01/29/2015     No results found for: LABVLDL  No results found for: CHOLHDLRATIO      DM Screen - 96 (JAN 2016)    Lab Results   Component Value Date    GLUCOSE 88 11/13/2017    GLUCOSE 134 12/12/2011         Colon Cancer Screening - NEG 3/13, repeat 5 years d/t questional fam hx  Tetanus - UTD 2007  Influenza Vaccine - Declines DEC 2017  Pneumonia Vaccine - UTD DEC 2014 PPV 23/wants to wait until next visit (Jerome Mosley 2016)/MARCH 2016 as well/and July 2016/AND NOV 2016/MARCH 2017  Zostavax - UTD FALL 2014     PSA testing discussion - 0.3 MAY 2016  AAA Screening - age 70    Falls screening - N/A      Specialist List    Oncology: Cisco Hairston  GI: Lue Riser: [facility]  ID: Brea Francisco      Current Outpatient Prescriptions   Medication Sig Dispense Refill    morphine (MS CONTIN) 60 MG extended release tablet Take 1 tablet by mouth 2 times daily for 30 days. 60 tablet 0    oxyCODONE (ROXICODONE) 5 MG immediate release tablet Take 1 tablet by mouth every 4 hours as needed for Pain for up to 30 days. 180 tablet 0    polyethylene glycol (GLYCOLAX) powder MIX 17 GRAMS IN 8 OUNCES OF LIQUID AND DRINK DAILY AS NEEDED FOR CONSTIPATION 527 g 0    fluticasone (FLONASE) 50 MCG/ACT nasal spray USE 1 SPRAY IN EACH NOSTRIL ONCE DAILY 3 Bottle 3    lidocaine (XYLOCAINE) 2 % jelly APPLY TOPICALLY AS NEEDED. 150 g 0    naproxen (NAPROSYN) 500 MG tablet TAKE 1 TABLET BY MOUTH TWICE DAILY AS NEEDED FOR PAIN 60 tablet 2    butalbital-acetaminophen-caffeine (FIORICET, ESGIC) -40 MG per tablet TAKE 1 TABLET BY MOUTH EVERY 6 HOURS AS NEEDED FOR HEADACHE 60 tablet 0    Nutritional Supplements (ENSURE) LIQD Low calorie Ensure (200 ronald).  Drink 2 cans per day 60 Can 5    Compression Stockings CANE) MISC Adjustable narrow base quad cane 1 each 0    triamcinolone (KENALOG) 0.1 % cream Apply topically 2 times daily for up to 4 weeks, then weekends only as needed. 60 g 0    HARVONI  MG per tablet Take 1 tablet by mouth daily       acetaminophen 650 MG TABS Take 650 mg by mouth every 4 hours as needed. 120 tablet 3    Multiple Vitamin (MULTIVITAMINS PO) Take 1 tablet by mouth daily. No current facility-administered medications for this visit. Facility-Administered Medications Ordered in Other Visits   Medication Dose Route Frequency Provider Last Rate Last Dose    Absorbable Collagen Hemostat MISC 0.5 g  0.5 g Intra-Lesional Once Alis Redmond MD         Orders Placed This Encounter   Medications    morphine (MS CONTIN) 60 MG extended release tablet     Sig: Take 1 tablet by mouth 2 times daily for 30 days. Dispense:  60 tablet     Refill:  0    oxyCODONE (ROXICODONE) 5 MG immediate release tablet     Sig: Take 1 tablet by mouth every 4 hours as needed for Pain for up to 30 days. Dispense:  180 tablet     Refill:  0         All medications reviewed and reconciled, including OTC and herbal medications. Updated list given to patient.        Patient Active Problem List    Diagnosis Date Noted    Dyslipidemia     S/P shoulder surgery 12/07/2017    Biceps tendon rupture, left, sequela 11/02/2017    Tear of right rotator cuff 09/25/2017    Chronic venous insufficiency     Allergic conjunctivitis 06/20/2017    Microscopic hematuria 05/31/2017     Following with urology now      Rupture of distal biceps tendon 05/08/2017    Osteopenia     History of lymphoma     Chronic pain of right knee 01/20/2017    Bilateral edema of lower extremity 06/23/2016    Enlarged thoracic aorta (H[facility])      Upper limits of normal size on CT chest with contrast 2016      Ventral hernia without obstruction or gangrene     Pulmonary nodules 09/15/2015     Being monitored by oncology      Cervical radiculopathy 09/15/2015    CLIFF (generalized anxiety disorder) 08/07/2015    Failed back syndrome 06/08/2015    Allergic rhinitis 06/08/2015    Chronic pain syndrome 04/05/2015    Insomnia 02/20/2015    History of chemotherapy 02/09/2015    Diffuse large B cell lymphoma ([address] follows with Dr Claire Huang 12/12/2014    Chronic combined systolic and diastolic heart failure ([address] 12/12/2014     EF 45-50% FEB 20152015/JUNE 2016  However, AUG 2017:  Summary   Left Ventricular size is Normal .   Normal left ventricular wall thickness.   Ejection fraction is visually estimated 55 %.  Mildly enlarged right atrium size.   Mildly enlarged right ventricle cavity.   Mild tricuspid regurgitation visualized.       DDD (degenerative disc disease), thoracolumbar 12/01/2014    Arthritis     Sleep apnea      no CPAP      GERD (gastroesophageal reflux disease)     Essential hypertension 10/20/2014    Nicotine dependence 07/16/2014    History of spinal stenosis 07/16/2014    Bilateral inguinal hernia (BIH) 09/26/2013    COPD (chronic obstructive pulmonary disease) ([address] 09/26/2013    Chronic hepatitis C without hepatic coma ([address] 09/26/2013          Past Medical History:   Diagnosis Date    Allergic conjunctivitis 6/20/2017    Allergic rhinitis 6/8/2015    Arthritis     Cervical radiculopathy 9/15/2015    Chronic combined systolic and diastolic heart failure ([address] 12/12/2014    EF 45-50% FEB 20152015/JUNE 2016    Chronic hepatitis C without hepatic coma ([address] 9/26/2013    Chronic pain syndrome 4/5/2015    Chronic venous insufficiency     COPD (chronic obstructive pulmonary disease) (HCC)     DDD (degenerative disc disease), thoracolumbar 12/1/2014    Diffuse large B cell lymphoma ([address] follows with Dr Claire Huang 12/12/2014    Dyslipidemia     Enlarged thoracic aorta ([address]     Upper limits of normal size on CT chest with contrast 2016    Essential hypertension     Failed back syndrome 6/8/2015    CLIFF murmurs, rubs, clicks, or gallops, distal pulses intact. Abdomen: soft, non-tender, non-distended, bowl sounds physiologic,  no rebound or guarding, no masses noted or obvious hernia or defect noted. Liver and spleen without enlargement. Extremities: no cyanosis, clubbing  of the lower extremities. +trace bilat ankle edema. +2 PT/DP bilaterally. Neg homans bilat. No calf edema or TTP. Musculoskeletal: No joint swelling or gross deformity   Skin: warm and dry, no rash or erythema. Narrative   PROCEDURE: CTA THORACIC AORTA       CLINICAL INFORMATION: Enlarged thoracic aorta ([address]       COMPARISON; 8/9/2016       TECHNIQUE: 1.5 mm axial images were obtained through the chest before and after the administration of IV contrast.  A non-contrast localizer was obtained.  3D reconstructions were performed on the scanner to include sagittal and coronal MIP images    through the thoracic aorta. .       FINDINGS:        The central airways appear patent. Mild upper zone predominant centrilobular emphysema is demonstrated. There is a 4 mm pulmonary nodule within the medial right lower lobe on axial image 68 which appears unchanged from prior examination. There is an    additional smaller 3 mm pulmonary nodule within the medial right lower lobe on axial image 53 which also appears unchanged from prior examination. No pleural effusion or pneumothorax is seen.       There are 2 small 2 mm pulmonary nodules within the anterior left mid lung on axial image 43 which appear unchanged from prior CT examination from August 2016. There are small mediastinal lymph nodes demonstrated.       Limited evaluation of the upper abdomen appears grossly unremarkable. There are small areas of gas attenuation along the biliary tree which may be related to prior sphincterotomy. This was noted on prior CT examination from August 2016.       No acute osseous findings are demonstrated.  Posterior spinal fusion hardware is demonstrated at T10 and appropriate. Controlled Substances Monitoring:     Attestation: The Prescription Monitoring Report for this patient was reviewed today. BHUPINDER Shrestha  Documentation: No signs of potential drug abuse or diversion identified. Viridiana Fleming DO)  Chronic Pain: Reestablished informed consent., Reviewed the patient's functional status and documentation. , Dose reduction has been attempted. Viridiana Fleming DO)  Medication Contracts: Existing medication contract. Viridiana Fleming DO)    - morphine (MS CONTIN) 60 MG extended release tablet; Take 1 tablet by mouth 2 times daily for 30 days. Dispense: 60 tablet; Refill: 0  - oxyCODONE (ROXICODONE) 5 MG immediate release tablet; Take 1 tablet by mouth every 4 hours as needed for Pain for up to 30 days. Dispense: 180 tablet; Refill: 0    2. Failed back syndrome    - morphine (MS CONTIN) 60 MG extended release tablet; Take 1 tablet by mouth 2 times daily for 30 days. Dispense: 60 tablet; Refill: 0  - oxyCODONE (ROXICODONE) 5 MG immediate release tablet; Take 1 tablet by mouth every 4 hours as needed for Pain for up to 30 days. Dispense: 180 tablet; Refill: 0    3. Chronic pain syndrome    - morphine (MS CONTIN) 60 MG extended release tablet; Take 1 tablet by mouth 2 times daily for 30 days. Dispense: 60 tablet; Refill: 0  - oxyCODONE (ROXICODONE) 5 MG immediate release tablet; Take 1 tablet by mouth every 4 hours as needed for Pain for up to 30 days. Dispense: 180 tablet; Refill: 0    4. History of spinal stenosis    - morphine (MS CONTIN) 60 MG extended release tablet; Take 1 tablet by mouth 2 times daily for 30 days. Dispense: 60 tablet; Refill: 0  - oxyCODONE (ROXICODONE) 5 MG immediate release tablet; Take 1 tablet by mouth every 4 hours as needed for Pain for up to 30 days. Dispense: 180 tablet; Refill: 0    5. Biceps tendon rupture, left, sequela    - morphine (MS CONTIN) 60 MG extended release tablet;  Take 1 tablet by mouth 2 times materials on: See Attached    I have instructed Jasson Kimbrough to complete a self tracking handout on Smoking and instructed them to bring it with them to his next appointment. Barriers to learning and self management: none    Discussed use, benefit, and side effects of prescribed medications. Barriers to medication compliance addressed. All patient questions answered. Pt voiced understanding.        Electronically signed by Manda Osgood, DO on 3/21/2018 at 1:31 PM

## 2018-03-21 NOTE — PATIENT INSTRUCTIONS
vegetables, beans, and whole grains in your diet each day. These foods are high in fiber. · Drink plenty of fluids, enough so that your urine is light yellow or clear like water. If you have kidney, heart, or liver disease and have to limit fluids, talk with your doctor before you increase the amount of fluids you drink. · If your doctor recommends it, get more exercise. Walking is a good choice. Bit by bit, increase the amount you walk every day. Try for at least 30 minutes on most days of the week. · Schedule time each day for a bowel movement. A daily routine may help. Take your time and do not strain when having a bowel movement. When should you call for help? Call your doctor now or seek immediate medical care if:  ? · Your pain gets worse or is out of control. ? · You feel down or blue, or you do not enjoy things like you once did. You may be depressed, which is common in people with chronic pain. Depression can be treated. ? · You have vomiting or cramps for more than 2 hours. ? Watch closely for changes in your health, and be sure to contact your doctor if:  ? · You cannot sleep because of pain. ? · You are very worried or anxious about your pain. ? · You have trouble taking your pain medicine. ? · You have any concerns about your pain medicine. ? · You have trouble with bowel movements, such as:  ¨ No bowel movement in 3 days. ¨ Blood in the anal area, in your stool, or on the toilet paper. ¨ Diarrhea for more than 24 hours. Where can you learn more? Go to https://qunbsilviaEase My Sell.SunModular. org and sign in to your Compliance 360 account. Enter N004 in the [facility] box to learn more about \"Chronic Pain: Care Instructions. \"     If you do not have an account, please click on the \"Sign Up Now\" link. Current as of: October 14, 2016  Content Version: 11.5  © 7083-8660 Healthwise, Incorporated. Care instructions adapted under license by [facility] ([facility]).  If you have questions about a medical condition or this instruction, always ask your healthcare professional. [place] [address] any warranty or liability for your use of this information. Patient Education        Learning About Safe Use of Long-Acting Opioids  Introduction    Long-acting opioids relieve moderate to severe pain. They are also called extended-release opioids. Opioids relieve pain by changing the way your body feels pain. They don't cure a health problem. They help you manage the pain. If you take a lot of short-acting pain medicine, your doctor may give you long-acting opioids. They help you avoid the ups and downs in pain relief that you may have with short-acting medicine. Opioids are strong medicines. They can help you manage pain when you use them the right way. But if you misuse them, they can cause serious harm and even death. If you decide to take opioids, here are some things to remember. · Keep your doctor informed. You can get addicted to opioids. The risk is higher if you have a history of substance use. Your doctor will monitor you closely for signs of misuse and addiction and to figure out when you no longer need to take opioids. · Make a treatment plan. The goal of your plan is to be able to function and do the things you need to do, even if you still have some pain. You might be able to manage your pain with other non-opioid options like physical therapy, relaxation, or over-the-counter pain medicines. · Be aware of the side effects. Opioids can cause serious side effects, such as constipation, dry mouth, and nausea. And over time, you may need a higher dose to get pain relief. This is called tolerance. Your body also gets used to opioids. This is called physical dependence. If you suddenly stop taking them, you may have withdrawal symptoms.   Examples  · Fentanyl patch (Duragesic)  · Methadone (Dolophine)  · Morphine (Kaelyn)  · Oxycodone controlled-release (OxyContin)  Safety tips  If states have a program that tracks prescriptions. If your state does, your doctor will check it to see if you've had prescriptions for opioids before. You may have a urine test to check for opioids in your system. If you're a woman, you may have a pregnancy test. Opioids are not safe to take if you are pregnant. When your doctor prescribes the medicine, he or she will discuss your treatment plan with you. That includes the risks and benefits of opioid medicines. Your doctor will tell you how much pain relief and improved ability to function you can expect. The goal is to take the lowest dose that improves your pain for the shortest amount of time. Your doctor will also talk with you about other things you can do to help relieve pain. Your doctor may suggest different medicines or other options, such as steroid injections, ice or heat, physical therapy, or ways to reduce stress. It's always a good idea to ask questions before you get a new prescription. This is especially true when you get a prescription for an opioid. Some questions to ask your doctor include:  · Why do I need this medicine? Is it right for me? · How long should I take this medicine? · What can I do to help with side effects, like constipation? · How should I store this medicine? What should I do with leftover or unused opioid medicine? · Do I need a prescription for naloxone? You and your doctor will talk about your responsibilities. You'll both sign a written agreement. You will agree to take your medicine exactly as your doctor tells you to. Melquiades Jimenez also agree to be careful with it and not to share it with others. What happens after you start to use your medicine? Regular follow-up visits to your doctor will help you and your doctor make sure that the medicine is the right treatment for your pain. At every visit, your doctor will check these things:  · Is your pain being controlled?   · Are you better able to function and be

## 2018-03-21 NOTE — TELEPHONE ENCOUNTER
----- Message from Yasmeen Slade DO sent at 3/21/2018  2:17 PM EDT -----  Please let pt know that lipids and PSA are WNL. Let me know if questions, thanks!

## 2018-03-26 ENCOUNTER — TELEPHONE (OUTPATIENT)
Dept: FAMILY MEDICINE CLINIC | Age: 58
End: 2018-03-26

## 2018-03-26 DIAGNOSIS — J01.90 ACUTE RHINOSINUSITIS: Primary | ICD-10-CM

## 2018-03-26 RX ORDER — DOXYCYCLINE HYCLATE 100 MG/1
100 CAPSULE ORAL 2 TIMES DAILY
Qty: 20 CAPSULE | Refills: 0 | Status: SHIPPED | OUTPATIENT
Start: 2018-03-26 | End: 2018-04-05

## 2018-03-28 DIAGNOSIS — M51.35 DDD (DEGENERATIVE DISC DISEASE), THORACOLUMBAR: Chronic | ICD-10-CM

## 2018-03-28 DIAGNOSIS — M96.1 FAILED BACK SYNDROME: Chronic | ICD-10-CM

## 2018-04-04 DIAGNOSIS — K21.9 GASTROESOPHAGEAL REFLUX DISEASE, ESOPHAGITIS PRESENCE NOT SPECIFIED: Chronic | ICD-10-CM

## 2018-04-04 RX ORDER — OMEPRAZOLE 20 MG/1
CAPSULE, DELAYED RELEASE ORAL
Qty: 90 CAPSULE | Refills: 3 | Status: SHIPPED | OUTPATIENT
Start: 2018-04-04 | End: 2019-01-01 | Stop reason: SDUPTHER

## 2018-04-18 ENCOUNTER — OFFICE VISIT (OUTPATIENT)
Dept: FAMILY MEDICINE CLINIC | Age: 58
End: 2018-04-18
Payer: MEDICARE

## 2018-04-18 VITALS
BODY MASS INDEX: 28.49 KG/M2 | HEIGHT: 65 IN | HEART RATE: 80 BPM | DIASTOLIC BLOOD PRESSURE: 78 MMHG | TEMPERATURE: 98.7 F | WEIGHT: 171 LBS | SYSTOLIC BLOOD PRESSURE: 136 MMHG | RESPIRATION RATE: 14 BRPM

## 2018-04-18 DIAGNOSIS — B18.2 CHRONIC HEPATITIS C WITHOUT HEPATIC COMA (HCC): Chronic | ICD-10-CM

## 2018-04-18 DIAGNOSIS — Z87.39 HISTORY OF SPINAL STENOSIS: Chronic | ICD-10-CM

## 2018-04-18 DIAGNOSIS — F17.210 CIGARETTE NICOTINE DEPENDENCE WITHOUT COMPLICATION: Chronic | ICD-10-CM

## 2018-04-18 DIAGNOSIS — G89.4 CHRONIC PAIN SYNDROME: Chronic | ICD-10-CM

## 2018-04-18 DIAGNOSIS — S46.212S BICEPS TENDON RUPTURE, LEFT, SEQUELA: ICD-10-CM

## 2018-04-18 DIAGNOSIS — Z98.890 S/P SHOULDER SURGERY: ICD-10-CM

## 2018-04-18 DIAGNOSIS — M51.35 DDD (DEGENERATIVE DISC DISEASE), THORACOLUMBAR: Primary | Chronic | ICD-10-CM

## 2018-04-18 DIAGNOSIS — M75.101 TEAR OF RIGHT ROTATOR CUFF, UNSPECIFIED TEAR EXTENT: ICD-10-CM

## 2018-04-18 DIAGNOSIS — M96.1 FAILED BACK SYNDROME: Chronic | ICD-10-CM

## 2018-04-18 DIAGNOSIS — I87.2 CHRONIC VENOUS INSUFFICIENCY: Chronic | ICD-10-CM

## 2018-04-18 DIAGNOSIS — F41.1 GAD (GENERALIZED ANXIETY DISORDER): Primary | ICD-10-CM

## 2018-04-18 PROCEDURE — 3017F COLORECTAL CA SCREEN DOC REV: CPT | Performed by: FAMILY MEDICINE

## 2018-04-18 PROCEDURE — G8417 CALC BMI ABV UP PARAM F/U: HCPCS | Performed by: FAMILY MEDICINE

## 2018-04-18 PROCEDURE — 4004F PT TOBACCO SCREEN RCVD TLK: CPT | Performed by: FAMILY MEDICINE

## 2018-04-18 PROCEDURE — 99214 OFFICE O/P EST MOD 30 MIN: CPT | Performed by: FAMILY MEDICINE

## 2018-04-18 PROCEDURE — G8427 DOCREV CUR MEDS BY ELIG CLIN: HCPCS | Performed by: FAMILY MEDICINE

## 2018-04-18 RX ORDER — MORPHINE SULFATE 60 MG/1
60 TABLET, FILM COATED, EXTENDED RELEASE ORAL 2 TIMES DAILY
Qty: 60 TABLET | Refills: 0 | Status: SHIPPED | OUTPATIENT
Start: 2018-04-18 | End: 2018-05-23 | Stop reason: SDUPTHER

## 2018-04-18 RX ORDER — OXYCODONE HYDROCHLORIDE 5 MG/1
5 TABLET ORAL EVERY 4 HOURS PRN
Qty: 180 TABLET | Refills: 0 | Status: SHIPPED | OUTPATIENT
Start: 2018-04-18 | End: 2018-05-16 | Stop reason: SDUPTHER

## 2018-04-18 RX ORDER — BUSPIRONE HYDROCHLORIDE 15 MG/1
TABLET ORAL
Qty: 360 TABLET | Refills: 3 | Status: SHIPPED | OUTPATIENT
Start: 2018-04-18

## 2018-04-18 RX ORDER — BUSPIRONE HYDROCHLORIDE 15 MG/1
TABLET ORAL
Qty: 360 TABLET | Refills: 3 | Status: CANCELLED | OUTPATIENT
Start: 2018-04-18

## 2018-05-06 DIAGNOSIS — J43.9 PULMONARY EMPHYSEMA, UNSPECIFIED EMPHYSEMA TYPE (HCC): Primary | ICD-10-CM

## 2018-05-12 DIAGNOSIS — M19.90 ARTHRITIS: Chronic | ICD-10-CM

## 2018-05-14 RX ORDER — NAPROXEN 500 MG/1
TABLET ORAL
Qty: 60 TABLET | Refills: 0 | Status: SHIPPED | OUTPATIENT
Start: 2018-05-14 | End: 2018-06-09 | Stop reason: SDUPTHER

## 2018-05-16 DIAGNOSIS — M75.101 TEAR OF RIGHT ROTATOR CUFF, UNSPECIFIED TEAR EXTENT: ICD-10-CM

## 2018-05-16 DIAGNOSIS — M51.35 DDD (DEGENERATIVE DISC DISEASE), THORACOLUMBAR: Chronic | ICD-10-CM

## 2018-05-16 DIAGNOSIS — G89.4 CHRONIC PAIN SYNDROME: Chronic | ICD-10-CM

## 2018-05-16 DIAGNOSIS — M96.1 FAILED BACK SYNDROME: Chronic | ICD-10-CM

## 2018-05-16 DIAGNOSIS — Z87.39 HISTORY OF SPINAL STENOSIS: Chronic | ICD-10-CM

## 2018-05-16 DIAGNOSIS — S46.212S BICEPS TENDON RUPTURE, LEFT, SEQUELA: ICD-10-CM

## 2018-05-16 DIAGNOSIS — Z98.890 S/P SHOULDER SURGERY: ICD-10-CM

## 2018-05-16 RX ORDER — OXYCODONE HYDROCHLORIDE 5 MG/1
5 TABLET ORAL EVERY 4 HOURS PRN
Qty: 180 TABLET | Refills: 0 | Status: SHIPPED | OUTPATIENT
Start: 2018-05-16 | End: 2018-06-13 | Stop reason: SDUPTHER

## 2018-05-18 ENCOUNTER — OFFICE VISIT (OUTPATIENT)
Dept: FAMILY MEDICINE CLINIC | Age: 58
End: 2018-05-18
Payer: MEDICARE

## 2018-05-18 VITALS
HEART RATE: 76 BPM | WEIGHT: 175 LBS | BODY MASS INDEX: 29.51 KG/M2 | RESPIRATION RATE: 16 BRPM | TEMPERATURE: 98.2 F | SYSTOLIC BLOOD PRESSURE: 118 MMHG | DIASTOLIC BLOOD PRESSURE: 70 MMHG

## 2018-05-18 DIAGNOSIS — I87.2 CHRONIC VENOUS INSUFFICIENCY: Chronic | ICD-10-CM

## 2018-05-18 DIAGNOSIS — R60.9 DEPENDENT EDEMA: ICD-10-CM

## 2018-05-18 DIAGNOSIS — L03.116 CELLULITIS OF LEFT LOWER EXTREMITY: Primary | ICD-10-CM

## 2018-05-18 PROCEDURE — 99214 OFFICE O/P EST MOD 30 MIN: CPT | Performed by: FAMILY MEDICINE

## 2018-05-18 PROCEDURE — 3017F COLORECTAL CA SCREEN DOC REV: CPT | Performed by: FAMILY MEDICINE

## 2018-05-18 PROCEDURE — 4004F PT TOBACCO SCREEN RCVD TLK: CPT | Performed by: FAMILY MEDICINE

## 2018-05-18 PROCEDURE — G8417 CALC BMI ABV UP PARAM F/U: HCPCS | Performed by: FAMILY MEDICINE

## 2018-05-18 PROCEDURE — G8427 DOCREV CUR MEDS BY ELIG CLIN: HCPCS | Performed by: FAMILY MEDICINE

## 2018-05-18 RX ORDER — FUROSEMIDE 20 MG/1
TABLET ORAL
Qty: 90 TABLET | Refills: 3 | Status: SHIPPED | OUTPATIENT
Start: 2018-05-18

## 2018-05-18 RX ORDER — CEPHALEXIN 500 MG/1
1000 CAPSULE ORAL 2 TIMES DAILY
Qty: 40 CAPSULE | Refills: 0 | Status: SHIPPED | OUTPATIENT
Start: 2018-05-18 | End: 2018-05-31

## 2018-05-21 ENCOUNTER — TELEPHONE (OUTPATIENT)
Dept: FAMILY MEDICINE CLINIC | Age: 58
End: 2018-05-21

## 2018-05-23 ENCOUNTER — OFFICE VISIT (OUTPATIENT)
Dept: FAMILY MEDICINE CLINIC | Age: 58
End: 2018-05-23
Payer: MEDICARE

## 2018-05-23 VITALS
DIASTOLIC BLOOD PRESSURE: 68 MMHG | RESPIRATION RATE: 16 BRPM | TEMPERATURE: 98.8 F | HEART RATE: 76 BPM | BODY MASS INDEX: 30.76 KG/M2 | SYSTOLIC BLOOD PRESSURE: 118 MMHG | WEIGHT: 173.6 LBS | HEIGHT: 63 IN

## 2018-05-23 DIAGNOSIS — M51.35 DDD (DEGENERATIVE DISC DISEASE), THORACOLUMBAR: Chronic | ICD-10-CM

## 2018-05-23 DIAGNOSIS — S46.212S BICEPS TENDON RUPTURE, LEFT, SEQUELA: ICD-10-CM

## 2018-05-23 DIAGNOSIS — S99.921A INJURY OF TOE ON RIGHT FOOT, INITIAL ENCOUNTER: ICD-10-CM

## 2018-05-23 DIAGNOSIS — L03.115 BILATERAL LOWER LEG CELLULITIS: ICD-10-CM

## 2018-05-23 DIAGNOSIS — S91.209A AVULSION OF TOENAIL, INITIAL ENCOUNTER: ICD-10-CM

## 2018-05-23 DIAGNOSIS — G89.4 CHRONIC PAIN SYNDROME: Chronic | ICD-10-CM

## 2018-05-23 DIAGNOSIS — M75.101 TEAR OF RIGHT ROTATOR CUFF, UNSPECIFIED TEAR EXTENT: ICD-10-CM

## 2018-05-23 DIAGNOSIS — Z87.39 HISTORY OF SPINAL STENOSIS: Chronic | ICD-10-CM

## 2018-05-23 DIAGNOSIS — L03.116 BILATERAL LOWER LEG CELLULITIS: ICD-10-CM

## 2018-05-23 DIAGNOSIS — M96.1 FAILED BACK SYNDROME: Chronic | ICD-10-CM

## 2018-05-23 DIAGNOSIS — F17.210 CIGARETTE NICOTINE DEPENDENCE WITHOUT COMPLICATION: Chronic | ICD-10-CM

## 2018-05-23 DIAGNOSIS — Z98.890 S/P SHOULDER SURGERY: ICD-10-CM

## 2018-05-23 DIAGNOSIS — I87.2 CHRONIC VENOUS INSUFFICIENCY: Primary | Chronic | ICD-10-CM

## 2018-05-23 PROCEDURE — G8417 CALC BMI ABV UP PARAM F/U: HCPCS | Performed by: FAMILY MEDICINE

## 2018-05-23 PROCEDURE — 4004F PT TOBACCO SCREEN RCVD TLK: CPT | Performed by: FAMILY MEDICINE

## 2018-05-23 PROCEDURE — 99214 OFFICE O/P EST MOD 30 MIN: CPT | Performed by: FAMILY MEDICINE

## 2018-05-23 PROCEDURE — 3017F COLORECTAL CA SCREEN DOC REV: CPT | Performed by: FAMILY MEDICINE

## 2018-05-23 PROCEDURE — G8427 DOCREV CUR MEDS BY ELIG CLIN: HCPCS | Performed by: FAMILY MEDICINE

## 2018-05-23 RX ORDER — DOXYCYCLINE HYCLATE 100 MG/1
100 CAPSULE ORAL 2 TIMES DAILY
Qty: 20 CAPSULE | Refills: 0 | Status: SHIPPED | OUTPATIENT
Start: 2018-05-23 | End: 2018-06-02

## 2018-05-23 RX ORDER — MORPHINE SULFATE 60 MG/1
60 TABLET, FILM COATED, EXTENDED RELEASE ORAL 2 TIMES DAILY
Qty: 60 TABLET | Refills: 0 | Status: SHIPPED | OUTPATIENT
Start: 2018-05-23 | End: 2018-06-21 | Stop reason: SDUPTHER

## 2018-05-24 ENCOUNTER — HOSPITAL ENCOUNTER (OUTPATIENT)
Dept: GENERAL RADIOLOGY | Age: 58
Discharge: HOME | End: 2018-05-24
Payer: MEDICARE

## 2018-05-24 ENCOUNTER — HOSPITAL ENCOUNTER (OUTPATIENT)
Age: 58
Discharge: HOME | End: 2018-05-24
Payer: MEDICARE

## 2018-05-24 DIAGNOSIS — S91.209A AVULSION OF TOENAIL, INITIAL ENCOUNTER: ICD-10-CM

## 2018-05-24 DIAGNOSIS — S99.921A INJURY OF TOE ON RIGHT FOOT, INITIAL ENCOUNTER: ICD-10-CM

## 2018-05-24 PROCEDURE — 73660 X-RAY EXAM OF TOE(S): CPT

## 2018-05-25 ENCOUNTER — TELEPHONE (OUTPATIENT)
Dept: FAMILY MEDICINE CLINIC | Age: 58
End: 2018-05-25

## 2018-05-31 ENCOUNTER — OFFICE VISIT (OUTPATIENT)
Dept: FAMILY MEDICINE CLINIC | Age: 58
End: 2018-05-31
Payer: MEDICARE

## 2018-05-31 VITALS
RESPIRATION RATE: 12 BRPM | DIASTOLIC BLOOD PRESSURE: 70 MMHG | HEART RATE: 86 BPM | TEMPERATURE: 98.4 F | HEIGHT: 63 IN | SYSTOLIC BLOOD PRESSURE: 132 MMHG | WEIGHT: 175 LBS | BODY MASS INDEX: 31.01 KG/M2

## 2018-05-31 DIAGNOSIS — S99.921D INJURY OF TOE ON RIGHT FOOT, SUBSEQUENT ENCOUNTER: ICD-10-CM

## 2018-05-31 DIAGNOSIS — L03.116 BILATERAL LOWER LEG CELLULITIS: Primary | ICD-10-CM

## 2018-05-31 DIAGNOSIS — I87.2 CHRONIC VENOUS INSUFFICIENCY: ICD-10-CM

## 2018-05-31 DIAGNOSIS — L03.115 BILATERAL LOWER LEG CELLULITIS: Primary | ICD-10-CM

## 2018-05-31 DIAGNOSIS — F17.210 CIGARETTE NICOTINE DEPENDENCE WITHOUT COMPLICATION: Chronic | ICD-10-CM

## 2018-05-31 DIAGNOSIS — S91.209A AVULSION OF TOENAIL OF RIGHT FOOT: ICD-10-CM

## 2018-05-31 PROCEDURE — 99213 OFFICE O/P EST LOW 20 MIN: CPT | Performed by: FAMILY MEDICINE

## 2018-05-31 PROCEDURE — 3017F COLORECTAL CA SCREEN DOC REV: CPT | Performed by: FAMILY MEDICINE

## 2018-05-31 PROCEDURE — G8417 CALC BMI ABV UP PARAM F/U: HCPCS | Performed by: FAMILY MEDICINE

## 2018-05-31 PROCEDURE — G8427 DOCREV CUR MEDS BY ELIG CLIN: HCPCS | Performed by: FAMILY MEDICINE

## 2018-05-31 PROCEDURE — 4004F PT TOBACCO SCREEN RCVD TLK: CPT | Performed by: FAMILY MEDICINE

## 2018-06-01 DIAGNOSIS — M96.1 FAILED BACK SYNDROME: Chronic | ICD-10-CM

## 2018-06-01 DIAGNOSIS — R11.2 NAUSEA AND VOMITING, INTRACTABILITY OF VOMITING NOT SPECIFIED, UNSPECIFIED VOMITING TYPE: ICD-10-CM

## 2018-06-01 DIAGNOSIS — M51.35 DDD (DEGENERATIVE DISC DISEASE), THORACOLUMBAR: Chronic | ICD-10-CM

## 2018-06-01 RX ORDER — ONDANSETRON 4 MG/1
TABLET, FILM COATED ORAL
Qty: 60 TABLET | Refills: 0 | Status: CANCELLED | OUTPATIENT
Start: 2018-06-01

## 2018-06-01 RX ORDER — ONDANSETRON 4 MG/1
TABLET, FILM COATED ORAL
Qty: 60 TABLET | Refills: 0 | Status: SHIPPED | OUTPATIENT
Start: 2018-06-01 | End: 2018-07-24 | Stop reason: SDUPTHER

## 2018-06-06 ENCOUNTER — TELEPHONE (OUTPATIENT)
Dept: FAMILY MEDICINE CLINIC | Age: 58
End: 2018-06-06

## 2018-06-06 DIAGNOSIS — M96.1 FAILED BACK SYNDROME: Chronic | ICD-10-CM

## 2018-06-06 DIAGNOSIS — G89.4 CHRONIC PAIN SYNDROME: Chronic | ICD-10-CM

## 2018-06-06 DIAGNOSIS — M51.35 DDD (DEGENERATIVE DISC DISEASE), THORACOLUMBAR: Chronic | ICD-10-CM

## 2018-06-06 DIAGNOSIS — J43.9 PULMONARY EMPHYSEMA, UNSPECIFIED EMPHYSEMA TYPE (HCC): Primary | Chronic | ICD-10-CM

## 2018-06-09 DIAGNOSIS — M19.90 ARTHRITIS: Chronic | ICD-10-CM

## 2018-06-09 RX ORDER — NAPROXEN 500 MG/1
500 TABLET ORAL 2 TIMES DAILY PRN
Qty: 180 TABLET | Refills: 0 | Status: SHIPPED | OUTPATIENT
Start: 2018-06-09 | End: 2018-09-11 | Stop reason: SDUPTHER

## 2018-06-13 DIAGNOSIS — Z87.39 HISTORY OF SPINAL STENOSIS: Chronic | ICD-10-CM

## 2018-06-13 DIAGNOSIS — Z98.890 S/P SHOULDER SURGERY: ICD-10-CM

## 2018-06-13 DIAGNOSIS — S46.212S BICEPS TENDON RUPTURE, LEFT, SEQUELA: ICD-10-CM

## 2018-06-13 DIAGNOSIS — G89.4 CHRONIC PAIN SYNDROME: Chronic | ICD-10-CM

## 2018-06-13 DIAGNOSIS — M75.101 TEAR OF RIGHT ROTATOR CUFF, UNSPECIFIED TEAR EXTENT: ICD-10-CM

## 2018-06-13 DIAGNOSIS — M51.35 DDD (DEGENERATIVE DISC DISEASE), THORACOLUMBAR: Chronic | ICD-10-CM

## 2018-06-13 DIAGNOSIS — M96.1 FAILED BACK SYNDROME: Chronic | ICD-10-CM

## 2018-06-13 RX ORDER — OXYCODONE HYDROCHLORIDE 5 MG/1
5 TABLET ORAL EVERY 4 HOURS PRN
Qty: 180 TABLET | Refills: 0 | Status: SHIPPED | OUTPATIENT
Start: 2018-06-13 | End: 2018-07-11 | Stop reason: SDUPTHER

## 2018-06-21 DIAGNOSIS — M96.1 FAILED BACK SYNDROME: Chronic | ICD-10-CM

## 2018-06-21 DIAGNOSIS — Z87.39 HISTORY OF SPINAL STENOSIS: Chronic | ICD-10-CM

## 2018-06-21 DIAGNOSIS — M51.35 DDD (DEGENERATIVE DISC DISEASE), THORACOLUMBAR: Chronic | ICD-10-CM

## 2018-06-21 DIAGNOSIS — G89.4 CHRONIC PAIN SYNDROME: Chronic | ICD-10-CM

## 2018-06-21 DIAGNOSIS — M75.101 TEAR OF RIGHT ROTATOR CUFF, UNSPECIFIED TEAR EXTENT: ICD-10-CM

## 2018-06-21 DIAGNOSIS — Z98.890 S/P SHOULDER SURGERY: ICD-10-CM

## 2018-06-21 DIAGNOSIS — S46.212S BICEPS TENDON RUPTURE, LEFT, SEQUELA: ICD-10-CM

## 2018-06-21 RX ORDER — MORPHINE SULFATE 60 MG/1
60 TABLET, FILM COATED, EXTENDED RELEASE ORAL 2 TIMES DAILY
Qty: 60 TABLET | Refills: 0 | Status: SHIPPED | OUTPATIENT
Start: 2018-06-21 | End: 2018-07-19 | Stop reason: SDUPTHER

## 2018-07-11 DIAGNOSIS — G89.4 CHRONIC PAIN SYNDROME: Chronic | ICD-10-CM

## 2018-07-11 DIAGNOSIS — M96.1 FAILED BACK SYNDROME: Chronic | ICD-10-CM

## 2018-07-11 DIAGNOSIS — S46.212S BICEPS TENDON RUPTURE, LEFT, SEQUELA: ICD-10-CM

## 2018-07-11 DIAGNOSIS — M51.35 DDD (DEGENERATIVE DISC DISEASE), THORACOLUMBAR: Chronic | ICD-10-CM

## 2018-07-11 DIAGNOSIS — Z98.890 S/P SHOULDER SURGERY: ICD-10-CM

## 2018-07-11 DIAGNOSIS — M75.101 TEAR OF RIGHT ROTATOR CUFF, UNSPECIFIED TEAR EXTENT: ICD-10-CM

## 2018-07-11 DIAGNOSIS — Z87.39 HISTORY OF SPINAL STENOSIS: Chronic | ICD-10-CM

## 2018-07-11 RX ORDER — OXYCODONE HYDROCHLORIDE 5 MG/1
5 TABLET ORAL EVERY 4 HOURS PRN
Qty: 180 TABLET | Refills: 0 | Status: SHIPPED | OUTPATIENT
Start: 2018-07-11 | End: 2018-08-08 | Stop reason: SDUPTHER

## 2018-07-14 DIAGNOSIS — M96.1 FAILED BACK SYNDROME: Chronic | ICD-10-CM

## 2018-07-14 DIAGNOSIS — M51.35 DDD (DEGENERATIVE DISC DISEASE), THORACOLUMBAR: Chronic | ICD-10-CM

## 2018-07-18 ENCOUNTER — TELEPHONE (OUTPATIENT)
Dept: FAMILY MEDICINE CLINIC | Age: 58
End: 2018-07-18

## 2018-07-18 ENCOUNTER — OFFICE VISIT (OUTPATIENT)
Dept: FAMILY MEDICINE CLINIC | Age: 58
End: 2018-07-18
Payer: MEDICARE

## 2018-07-18 VITALS
BODY MASS INDEX: 31.79 KG/M2 | HEIGHT: 63 IN | WEIGHT: 179.4 LBS | DIASTOLIC BLOOD PRESSURE: 76 MMHG | TEMPERATURE: 98.9 F | RESPIRATION RATE: 20 BRPM | HEART RATE: 94 BPM | SYSTOLIC BLOOD PRESSURE: 118 MMHG

## 2018-07-18 DIAGNOSIS — Z98.890 S/P SHOULDER SURGERY: ICD-10-CM

## 2018-07-18 DIAGNOSIS — S46.212S BICEPS TENDON RUPTURE, LEFT, SEQUELA: ICD-10-CM

## 2018-07-18 DIAGNOSIS — G89.4 CHRONIC PAIN SYNDROME: Chronic | ICD-10-CM

## 2018-07-18 DIAGNOSIS — J40 BRONCHITIS: ICD-10-CM

## 2018-07-18 DIAGNOSIS — Z87.39 HISTORY OF SPINAL STENOSIS: Chronic | ICD-10-CM

## 2018-07-18 DIAGNOSIS — M75.101 TEAR OF RIGHT ROTATOR CUFF, UNSPECIFIED TEAR EXTENT: ICD-10-CM

## 2018-07-18 DIAGNOSIS — M96.1 FAILED BACK SYNDROME: Chronic | ICD-10-CM

## 2018-07-18 DIAGNOSIS — J43.9 PULMONARY EMPHYSEMA, UNSPECIFIED EMPHYSEMA TYPE (HCC): ICD-10-CM

## 2018-07-18 DIAGNOSIS — M51.35 DDD (DEGENERATIVE DISC DISEASE), THORACOLUMBAR: Primary | Chronic | ICD-10-CM

## 2018-07-18 DIAGNOSIS — R31.29 MICROSCOPIC HEMATURIA: ICD-10-CM

## 2018-07-18 DIAGNOSIS — F17.210 CIGARETTE NICOTINE DEPENDENCE WITHOUT COMPLICATION: Chronic | ICD-10-CM

## 2018-07-18 PROCEDURE — 3023F SPIROM DOC REV: CPT | Performed by: FAMILY MEDICINE

## 2018-07-18 PROCEDURE — G8926 SPIRO NO PERF OR DOC: HCPCS | Performed by: FAMILY MEDICINE

## 2018-07-18 PROCEDURE — G8427 DOCREV CUR MEDS BY ELIG CLIN: HCPCS | Performed by: FAMILY MEDICINE

## 2018-07-18 PROCEDURE — 4004F PT TOBACCO SCREEN RCVD TLK: CPT | Performed by: FAMILY MEDICINE

## 2018-07-18 PROCEDURE — G8417 CALC BMI ABV UP PARAM F/U: HCPCS | Performed by: FAMILY MEDICINE

## 2018-07-18 PROCEDURE — 99214 OFFICE O/P EST MOD 30 MIN: CPT | Performed by: FAMILY MEDICINE

## 2018-07-18 PROCEDURE — 3017F COLORECTAL CA SCREEN DOC REV: CPT | Performed by: FAMILY MEDICINE

## 2018-07-18 RX ORDER — AZITHROMYCIN 250 MG/1
TABLET, FILM COATED ORAL
Qty: 1 PACKET | Refills: 0 | Status: SHIPPED | OUTPATIENT
Start: 2018-07-18 | End: 2018-07-23

## 2018-07-18 NOTE — PROGRESS NOTES
in L shoulder getting better. MS contin working well at 60mg bid and we dec his ox to 10mg q6h prn from q4h prn 2 wks ago and has done fine with this. Due for refill of ox. Has 1 wk of morphine left. Overall stable if a bit better. No bowel issues.      UPDATE PRIOR VISIT: doing well with weaning down on pain meds. On 60mg bid of MS contin and oxy IR 10mg q6h prn. Willing to drop to 5mg q6h prn. Feels really good pain wise.       UPDATE PRIOR VISIT: pain stable. MS contin working fine at present dose. 30mg not nearly as effective. Doing ok with dropping oxy IR to 5m q6h prn from 10. In more pain than prior, but is tolerable. Doesn't want to adjust further at this time.       UPDATE PRIOR VISIT: MS contin at 60mg bid con't to work well. We had worked him down to oxy IR 5mg q6h, but this isn't holding him. Asking if can go do q4h prn. Feels this will get him to where he needs for his pain. We discussed his MEQ level today.       UPDATE PRIOR VISIT: pain stable on current regimen. We'd changed his oxy IR to 5mg q4h prn from q6h prn last visit. MS contin the same. Working well.       UPDATE LAST VISIT: regimen unchanged, oxy IR 5mg q6h prn and MS contin 60mg bid. Tolerating well. Pain controlled. Pain not controlled on lower MEQ at this time. UPDATE TODAY: mistake in last visit for this. He is on oxy IR 5mg q4h prn and MS contin 60mg bid. Have weaned him down quite a bit, but is still on about 165 MEQ . Have tried to get him lower, but have been unable to so far. Has been stable on this regimen for a few months now. Pain in L arm and R shoulder better. Never had surgery for R rotator cuff tear. States doing better, so plans to monitor.       -02. COPD: breathing close baseline. Taking Symbicort and prn alb. Typically uses alb once to twice per day. Worse the last wk d/t inc cough and mild wheezing. No SOB he states. No fevers.       -03. Microscopic hematuria: seen by uro with neg w/u in AUG 2017.  He was to have with them in the spring of this year, but has not. Higher risk for bladder CA d/t smoking. Was lost to f/u. Denies flank pain, hematuria or fevers.       -04. Smoking: up to 1.5 PPD. States can't take chantix or wellbutrin d/t side effects. Cannot afford patches or gum. Asking if med assistance can help. Age/Gender Health Maintenance    Lipid - ;  LDL 71; HDL 65; TG 83 (JAN 2017)    Lab Results   Component Value Date    CHOL 159 03/21/2018    CHOL 108 01/30/2017    CHOL 179 01/09/2016     Lab Results   Component Value Date    TRIG 65 03/21/2018    TRIG 101 01/30/2017    TRIG 93 01/09/2016     Lab Results   Component Value Date    HDL 69 03/21/2018    HDL 40 01/30/2017    HDL 77 01/09/2016     Lab Results   Component Value Date    LDLCALC 77 03/21/2018    LDLCALC 48 01/30/2017    LDLCALC 83 01/09/2016     No results found for: LABVLDL  No results found for: CHOLHDLRATIO      DM Screen - 96 (JAN 2016)    Lab Results   Component Value Date    GLUCOSE 88 11/13/2017    GLUCOSE 134 12/12/2011       Lung CA: NEG CT MARCH 2018  Colon Cancer Screening - NEG 3/13, repeat 5 years d/t questional fam hx (has upcoming apt with GI per pt APR 2018) had to reschedule July 2018  Tetanus - UTD 2007  Influenza Vaccine - Declines DEC 2017  Pneumonia Vaccine - UTD DEC 2014 PPV 23/wants to wait until next visit (Sabina Trevino 2016)/MARCH 2016 as well/and July 2016/AND NOV 2016/MARCH 2017/declines July 2018 (States will get with flu FALL 2018)  Zostavax - UTD FALL 2014     PSA testing discussion - 0.3 MAY 2016/0.3 (APR 2018)  AAA Screening - age 70    Falls screening - N/A      Specialist List    Oncology: Roc Nicolas  GI: Mary Iha: [facility]  ID: Iman Dez       Current Outpatient Prescriptions   Medication Sig Dispense Refill    azithromycin (ZITHROMAX) 250 MG tablet Take 2 tabs (500 mg) on Day 1, and take 1 tab (250 mg) on days 2 through 5. 1 packet 0    lidocaine (XYLOCAINE) 2 % jelly APPLY TOPICALLY AS NEEDED 150 g 0    tiZANidine (ZANAFLEX) 4 MG tablet Take 1 tablet by mouth every 8 hours as needed (back pain) 90 tablet 1    albuterol (PROVENTIL) (2.5 MG/3ML) 0.083% nebulizer solution USE 1 VIAL VIA NEBULIZER EVERY 4 HOURS AS NEEDED FOR WHEEZING OR SHORTNESS OF BREATH 360 mL 3    guaiFENesin (MUCINEX) 600 MG extended release tablet Take 1 tablet by mouth 2 times daily as needed for Congestion 60 tablet 1    metoprolol tartrate (LOPRESSOR) 25 MG tablet TAKE 1 TABLET BY MOUTH TWICE DAILY 180 tablet 3    atorvastatin (LIPITOR) 10 MG tablet TAKE 1 TABLET BY MOUTH ONCE DAILY 90 tablet 3    calcium-cholecalciferol 500-200 MG-UNIT per tablet Take 1 tablet by mouth 2 times daily 180 tablet 3    nystatin (MYCOSTATIN) 819457 UNIT/ML suspension Take 5 mLs by mouth 4 times daily 400 mL 5    aspirin 81 MG EC tablet Take 1 tablet by mouth daily 30 tablet 3    Misc. Devices MISC TENS UNIT ELECTRODE PADS    Use daily as needed for back pain. 4 each 3    Misc. Devices (QUAD CANE) MISC Adjustable narrow base quad cane 1 each 0    triamcinolone (KENALOG) 0.1 % cream Apply topically 2 times daily for up to 4 weeks, then weekends only as needed. 60 g 0    Multiple Vitamin (MULTIVITAMINS PO) Take 1 tablet by mouth daily. No current facility-administered medications for this visit. Facility-Administered Medications Ordered in Other Visits   Medication Dose Route Frequency Provider Last Rate Last Dose    Absorbable Collagen Hemostat MISC 0.5 g  0.5 g Intra-Lesional Once Sussy Figueroa MD         Orders Placed This Encounter   Medications    azithromycin (ZITHROMAX) 250 MG tablet     Sig: Take 2 tabs (500 mg) on Day 1, and take 1 tab (250 mg) on days 2 through 5. Dispense:  1 packet     Refill:  0         All medications reviewed and reconciled, including OTC and herbal medications. Updated list given to patient.        Patient Active Problem List    Diagnosis Date Noted    Dyslipidemia     S/P shoulder surgery 12/07/2017 radiculopathy 9/15/2015    Chronic combined systolic and diastolic heart failure ([address] 12/12/2014    EF 45-50% FEB 20152015/JUNE 2016    Chronic hepatitis C without hepatic coma (HCC) 9/26/2013    Chronic pain syndrome 4/5/2015    Chronic venous insufficiency     COPD (chronic obstructive pulmonary disease) (HCC)     DDD (degenerative disc disease), thoracolumbar 12/1/2014    Diffuse large B cell lymphoma ([address] follows with Dr Angelika Joy 12/12/2014    Dyslipidemia     Enlarged thoracic aorta ([address]     Upper limits of normal size on CT chest with contrast 2016    Essential hypertension     Failed back syndrome 6/8/2015    CLIFF (generalized anxiety disorder) 8/7/2015    GERD (gastroesophageal reflux disease)     History of lymphoma     Insomnia 2/20/2015    Microscopic hematuria 5/31/2017    Nicotine dependence 7/16/2014    Osteopenia     Personal history of colonic polyps 10/24/2014    Pulmonary nodules 9/15/2015    Being monitored by oncology     Sleep apnea     no CPAP         Past Surgical History:   Procedure Laterality Date    APPENDECTOMY  age 13    [facility] BACK SURGERY  3/31/2014    Lumbar Laminectomy L3-5    BICEPS TENDON REPAIR Left 11/01/2017    Dr. Montesinos Patch  2013    Dr. Luisana Chairez 2014    Billiary Stent    UMBILICAL HERNIA REPAIR  age 13    [facility] VENTRAL HERNIA REPAIR  10/5/2015    w mesh         Allergies   Allergen Reactions    Chantix [Varenicline]      Makes him feel terrible    Keflex [Cephalexin] Itching    Neurontin [Gabapentin] Other (See Comments)     HALLUCINATIONS    Tramadol Nausea Only and Nausea And Vomiting         Social History     Social History    Marital status:      Spouse name: Sierra  Number of children: 2    Years of education: 6     Occupational History     [employer]     Social History Main Topics    Smoking status: Current Every Day Smoker     Packs/day: 1.00     Years: 30.00 Types: Cigarettes    Smokeless tobacco: Never Used      Comment: smoking cessation information given at past appt    Alcohol use No    Drug use: No    Sexual activity: Yes     Partners: Female     Other Topics Concern    Not on file     Social History Narrative    No narrative on file         Family History   Problem Relation Age of Onset    Heart Disease Mother     Diabetes Mother     High Blood Pressure Mother     High Cholesterol Mother     High Blood Pressure Father     High Cholesterol Father     Cancer Paternal Grandfather         colon    Diabetes Maternal Grandmother          I have reviewed the patient's past medical history, past surgical history, allergies, medications, social and family history and I have made updates where appropriate. Review of Systems  Positive responses are highlighted in bold    Constitutional:  Fever, Chills, Night Sweats, Fatigue, Unexpected changes in weight  HENT:  Ear pain, Tinnitus, Nosebleeds, Trouble swallowing, Hearing loss  Cardiovascular:  Chest Pain, Palpitations, Orthopnea, Paroxysmal Nocturnal Dyspnea  Respiratory:  Cough, Wheezing, Shortness of breath, Chest tightness, Apnea  Gastrointestinal:  Nausea, Vomiting, Diarrhea, Constipation, Heartburn, Blood in stool  Genitourinary:  Difficulty or painful urination, Flank pain, Change in frequency, Urgency  Skin:  Color change, Rash, Itching, Wound  Musculoskeletal:  Joint pain, Back pain, Gait problems, Joint swelling, Myalgias  Neurological:  Dizziness, Headaches, Presyncope, Numbness, Seizures, Tremors  Endocrine:  Heat Intolerance, Cold Intolerance, Polydipsia, Polyphagia, Polyuria      PHYSICAL EXAM:  Vitals:    07/18/18 1241   BP: 118/76   Pulse: 94   Resp: 20   Temp: 98.9 °F (37.2 °C)   Weight: 179 lb 6.4 oz (81.4 kg)   Height: 5' 3.3\" (1.608 m)     Body mass index is 31.48 kg/m².   Pain Score:   4 (BACK)    VS Reviewed  General Appearance: A&O x 3, No acute distress,well developed and well- present for longer than 90 days. Non opioid medications have been tried and have not sufficiently improved his pain or are contraindicated. 2. Chronic pain syndrome    As per # 1    3. Failed back syndrome    As per # 1    4. History of spinal stenosis    As per # 1    5. Biceps tendon rupture, left, sequela    improved    6. S/P shoulder surgery      7. Tear of right rotator cuff, unspecified tear extent    improved    8. Pulmonary emphysema, unspecified emphysema type ([address]    Stable  con't symbicort and prn alb  Needs baseline PFTS, ordered  Needs to quit smoking, needs financial help to do this  Will touch base with med assistance    - FULL PFT STUDY WITH PRE AND POST; Future    9. Bronchitis    F/u if no better  Reviewed ER precautions, pt understands. - azithromycin (ZITHROMAX) 250 MG tablet; Take 2 tabs (500 mg) on Day 1, and take 1 tab (250 mg) on days 2 through 5. Dispense: 1 packet; Refill: 0    10. Microscopic hematuria    Needs to f/u with uro  Office helping make that apt today  Discussed importance of this f/u    11. Cigarette nicotine dependence without complication    As per # 9    - FULL PFT STUDY WITH PRE AND POST; Future      DISPOSITION    Return in about 3 months (around 10/18/2018) for follow-up on chronic medical conditions, sooner as needed. Keshav Velazquez released without restrictions. Future Appointments  Date Time Provider Sara Almonte   8/20/2018 2:30 PM Jason Brooks Urology [facility] [place] [facility]   8/30/2018 9:00 AM Attila Steele MD Oncology [facility]   10/22/2018 1:20 PM DO Chance Johnston Med [address] [facility] [place] received counseling on the following healthy behaviors: nutrition, exercise, medication adherence and tobacco cessation    Patient given educational materials on: See Attached    I have instructed Keshav Velazquez to complete a self tracking handout on Smoking and instructed them to bring it with them to his next appointment.

## 2018-07-18 NOTE — TELEPHONE ENCOUNTER
Ok  Please let pt know  He ronald call [phone] NOW to see if they can provide any help  Otherwise, he'll have to pay out of pocket. Thanks!     Future Appointments  Date Time Provider Sara Almonte   8/20/2018 2:30 PM Jason Chambers Urology [facility]   8/30/2018 9:00 AM Raquel Burrell MD Oncology [facility] [place]   10/22/2018 1:20 PM Boby Murguia, [address]

## 2018-07-18 NOTE — PATIENT INSTRUCTIONS
home?  · Ask your family, friends, and coworkers for support. You have a better chance of quitting if you have help and support. · Join a support group, such as Nicotine Anonymous, for people who are trying to quit smoking. · Consider signing up for a smoking cessation program, such as the American Lung Association's Freedom from Smoking program.  · Get text messaging support. Go to the website at www.smokefree. gov to sign up for the [facility] program.  · Set a quit date. Pick your date carefully so that it is not right in the middle of a big deadline or stressful time. Once you quit, do not even take a puff. Get rid of all ashtrays and lighters after your last cigarette. Clean your house and your clothes so that they do not smell of smoke. · Learn how to be a nonsmoker. Think about ways you can avoid those things that make you reach for a cigarette. ¨ Avoid situations that put you at greatest risk for smoking. For some people, it is hard to have a drink with friends without smoking. For others, they might skip a coffee break with coworkers who smoke. ¨ Change your daily routine. Take a different route to work or eat a meal in a different place. · Cut down on stress. Calm yourself or release tension by doing an activity you enjoy, such as reading a book, taking a hot bath, or gardening. · Talk to your doctor or pharmacist about nicotine replacement therapy, which replaces the nicotine in your body. You still get nicotine but you do not use tobacco. Nicotine replacement products help you slowly reduce the amount of nicotine you need. These products come in several forms, many of them available over-the-counter:  ¨ Nicotine patches  ¨ Nicotine gum and lozenges  ¨ Nicotine inhaler  · Ask your doctor about bupropion (Wellbutrin) or varenicline (Chantix), which are prescription medicines. They do not contain nicotine. They help you by reducing withdrawal symptoms, such as stress and anxiety.   · Some people find list of the medicines you take. How can you care for yourself at home?   Staying healthy    · Do not smoke. This is the most important step you can take to prevent more damage to your lungs. If you need help quitting, talk to your doctor about stop-smoking programs and medicines. These can increase your chances of quitting for good.     · Avoid colds and flu. Get a pneumococcal vaccine shot. If you have had one before, ask your doctor whether you need a second dose. Get the flu vaccine every fall. If you must be around people with colds or the flu, wash your hands often.     · Avoid secondhand smoke, air pollution, and high altitudes. Also avoid cold, dry air and hot, humid air. Stay at home with your windows closed when air pollution is bad.    Medicines and oxygen therapy    · Take your medicines exactly as prescribed. Call your doctor if you think you are having a problem with your medicine.     · You may be taking medicines such as:  ¨ Bronchodilators. These help open your airways and make breathing easier. Bronchodilators are either short-acting (work for 6 to 9 hours) or long-acting (work for 24 hours). You inhale most bronchodilators, so they start to act quickly. Always carry your quick-relief inhaler with you in case you need it while you are away from home. ¨ Corticosteroids (prednisone, budesonide). These reduce airway inflammation. They come in pill or inhaled form. You must take these medicines every day for them to work well.     · A spacer may help you get more inhaled medicine to your lungs. Ask your doctor or pharmacist if a spacer is right for you. If it is, ask how to use it properly.     · Do not take any vitamins, over-the-counter medicine, or herbal products without talking to your doctor first.     · If your doctor prescribed antibiotics, take them as directed. Do not stop taking them just because you feel better.  You need to take the full course of antibiotics.     · Oxygen therapy boosts

## 2018-07-19 DIAGNOSIS — Z98.890 S/P SHOULDER SURGERY: ICD-10-CM

## 2018-07-19 DIAGNOSIS — G89.4 CHRONIC PAIN SYNDROME: Chronic | ICD-10-CM

## 2018-07-19 DIAGNOSIS — M96.1 FAILED BACK SYNDROME: Chronic | ICD-10-CM

## 2018-07-19 DIAGNOSIS — S46.212S BICEPS TENDON RUPTURE, LEFT, SEQUELA: ICD-10-CM

## 2018-07-19 DIAGNOSIS — M51.35 DDD (DEGENERATIVE DISC DISEASE), THORACOLUMBAR: Chronic | ICD-10-CM

## 2018-07-19 DIAGNOSIS — Z87.39 HISTORY OF SPINAL STENOSIS: Primary | Chronic | ICD-10-CM

## 2018-07-19 DIAGNOSIS — M75.101 TEAR OF RIGHT ROTATOR CUFF, UNSPECIFIED TEAR EXTENT: ICD-10-CM

## 2018-07-19 RX ORDER — MORPHINE SULFATE 60 MG/1
60 TABLET, FILM COATED, EXTENDED RELEASE ORAL 2 TIMES DAILY
Qty: 60 TABLET | Refills: 0 | Status: SHIPPED | OUTPATIENT
Start: 2018-07-19 | End: 2018-08-15 | Stop reason: SDUPTHER

## 2018-07-19 NOTE — TELEPHONE ENCOUNTER
Contracts: Existing medication contract.  Nannette Suh DO)      Future Appointments  Date Time Provider Sara Almonte   7/30/2018 4:30 PM STR PULMONARY FUNCTION ROOM 1 [facility] PFT None   8/20/2018 2:30 PM Jason Ashby [facility]   8/30/2018 9:00 AM Sharmila Wilson MD [facility]   10/22/2018 1:20 PM Nannette Suh DO [address] [place] [address]

## 2018-07-24 DIAGNOSIS — R11.2 NAUSEA AND VOMITING, INTRACTABILITY OF VOMITING NOT SPECIFIED, UNSPECIFIED VOMITING TYPE: ICD-10-CM

## 2018-07-24 RX ORDER — ONDANSETRON 4 MG/1
TABLET, FILM COATED ORAL
Qty: 60 TABLET | Refills: 0 | Status: SHIPPED | OUTPATIENT
Start: 2018-07-24 | End: 2018-10-04 | Stop reason: SDUPTHER

## 2018-08-08 DIAGNOSIS — M96.1 FAILED BACK SYNDROME: Chronic | ICD-10-CM

## 2018-08-08 DIAGNOSIS — M75.101 TEAR OF RIGHT ROTATOR CUFF, UNSPECIFIED TEAR EXTENT: ICD-10-CM

## 2018-08-08 DIAGNOSIS — G89.4 CHRONIC PAIN SYNDROME: Chronic | ICD-10-CM

## 2018-08-08 DIAGNOSIS — S46.212S BICEPS TENDON RUPTURE, LEFT, SEQUELA: ICD-10-CM

## 2018-08-08 DIAGNOSIS — Z98.890 S/P SHOULDER SURGERY: ICD-10-CM

## 2018-08-08 DIAGNOSIS — M51.35 DDD (DEGENERATIVE DISC DISEASE), THORACOLUMBAR: Chronic | ICD-10-CM

## 2018-08-08 DIAGNOSIS — Z87.39 HISTORY OF SPINAL STENOSIS: Chronic | ICD-10-CM

## 2018-08-08 RX ORDER — OXYCODONE HYDROCHLORIDE 5 MG/1
5 TABLET ORAL EVERY 4 HOURS PRN
Qty: 180 TABLET | Refills: 0 | Status: SHIPPED | OUTPATIENT
Start: 2018-08-08 | End: 2018-08-15 | Stop reason: SDUPTHER

## 2018-08-08 NOTE — TELEPHONE ENCOUNTER
reduction has been attempted. Amy Alberto DO)  Medication Contracts: Existing medication contract.  Amy Alberto DO)      Future Appointments  Date Time Provider Sara Almonte   8/20/2018 2:30 PM [facility]   8/30/2018 9:00 AM Sulaiman Childs MD Oncology [facility] [place]   10/22/2018 1:20 PM Amy Alberto DO [facility]

## 2018-08-15 ENCOUNTER — TELEPHONE (OUTPATIENT)
Dept: FAMILY MEDICINE CLINIC | Age: 58
End: 2018-08-15

## 2018-08-15 DIAGNOSIS — Z98.890 S/P SHOULDER SURGERY: ICD-10-CM

## 2018-08-15 DIAGNOSIS — G89.4 CHRONIC PAIN SYNDROME: Chronic | ICD-10-CM

## 2018-08-15 DIAGNOSIS — M51.35 DDD (DEGENERATIVE DISC DISEASE), THORACOLUMBAR: Chronic | ICD-10-CM

## 2018-08-15 DIAGNOSIS — Z87.39 HISTORY OF SPINAL STENOSIS: Chronic | ICD-10-CM

## 2018-08-15 DIAGNOSIS — M96.1 FAILED BACK SYNDROME: Chronic | ICD-10-CM

## 2018-08-15 DIAGNOSIS — M75.101 TEAR OF RIGHT ROTATOR CUFF, UNSPECIFIED TEAR EXTENT: ICD-10-CM

## 2018-08-15 DIAGNOSIS — S46.212S BICEPS TENDON RUPTURE, LEFT, SEQUELA: Primary | ICD-10-CM

## 2018-08-15 DIAGNOSIS — S46.212S BICEPS TENDON RUPTURE, LEFT, SEQUELA: ICD-10-CM

## 2018-08-15 RX ORDER — MORPHINE SULFATE 60 MG/1
60 TABLET, FILM COATED, EXTENDED RELEASE ORAL 2 TIMES DAILY
Qty: 60 TABLET | Refills: 0 | Status: SHIPPED | OUTPATIENT
Start: 2018-08-15 | End: 2018-09-13 | Stop reason: SDUPTHER

## 2018-08-15 RX ORDER — OXYCODONE HYDROCHLORIDE 5 MG/1
5 TABLET ORAL EVERY 4 HOURS PRN
Qty: 180 TABLET | Refills: 0 | Status: SHIPPED | OUTPATIENT
Start: 2018-08-15 | End: 2018-09-04 | Stop reason: SDUPTHER

## 2018-08-15 NOTE — TELEPHONE ENCOUNTER
Patient is calling in regarding his refill request from earlier today. He said oxycodone was sent to his pharmacy, but he is needs the morphine. He said the oxycodone was just refilled last week, the current request should be for the morphine. Please verify.

## 2018-08-15 NOTE — TELEPHONE ENCOUNTER
ASSESSMENT & PLAN  1. Biceps tendon rupture, left, sequela    - oxyCODONE (ROXICODONE) 5 MG immediate release tablet; Take 1 tablet by mouth every 4 hours as needed for Pain for up to 30 days. .  Dispense: 180 tablet; Refill: 0    2. S/P shoulder surgery    - oxyCODONE (ROXICODONE) 5 MG immediate release tablet; Take 1 tablet by mouth every 4 hours as needed for Pain for up to 30 days. .  Dispense: 180 tablet; Refill: 0    3. Tear of right rotator cuff, unspecified tear extent    - oxyCODONE (ROXICODONE) 5 MG immediate release tablet; Take 1 tablet by mouth every 4 hours as needed for Pain for up to 30 days. .  Dispense: 180 tablet; Refill: 0    4. DDD (degenerative disc disease), thoracolumbar  - oxyCODONE (ROXICODONE) 5 MG immediate release tablet; Take 1 tablet by mouth every 4 hours as needed for Pain for up to 30 days. .  Dispense: 180 tablet; Refill: 0    5. Failed back syndrome    - oxyCODONE (ROXICODONE) 5 MG immediate release tablet; Take 1 tablet by mouth every 4 hours as needed for Pain for up to 30 days. .  Dispense: 180 tablet; Refill: 0    6. History of spinal stenosis    - oxyCODONE (ROXICODONE) 5 MG immediate release tablet; Take 1 tablet by mouth every 4 hours as needed for Pain for up to 30 days. .  Dispense: 180 tablet; Refill: 0    7. Chronic pain syndrome    - oxyCODONE (ROXICODONE) 5 MG immediate release tablet; Take 1 tablet by mouth every 4 hours as needed for Pain for up to 30 days. .  Dispense: 180 tablet; Refill: 0      OAARS reviewed and appropriate. Controlled Substances Monitoring: Attestation: The Prescription Monitoring Report for this patient was reviewed today. Mello Driscoll DO)  Documentation: Possible medication side effects, risk of tolerance/dependence & alternative treatments discussed., No signs of potential drug abuse or diversion identified.  Mello Driscoll DO)  Chronic Pain: Functional status reviewed - continues with improved or maintaining ADL's. Amberly LOUIS

## 2018-08-23 ENCOUNTER — TELEPHONE (OUTPATIENT)
Dept: FAMILY MEDICINE CLINIC | Age: 58
End: 2018-08-23

## 2018-08-23 ENCOUNTER — OFFICE VISIT (OUTPATIENT)
Dept: FAMILY MEDICINE CLINIC | Age: 58
End: 2018-08-23
Payer: MEDICARE

## 2018-08-23 VITALS
RESPIRATION RATE: 18 BRPM | HEIGHT: 63 IN | BODY MASS INDEX: 32.82 KG/M2 | TEMPERATURE: 98.1 F | SYSTOLIC BLOOD PRESSURE: 126 MMHG | WEIGHT: 185.2 LBS | DIASTOLIC BLOOD PRESSURE: 80 MMHG | HEART RATE: 66 BPM

## 2018-08-23 DIAGNOSIS — Z87.19 HISTORY OF VENTRAL HERNIA REPAIR: ICD-10-CM

## 2018-08-23 DIAGNOSIS — F17.210 CIGARETTE NICOTINE DEPENDENCE WITHOUT COMPLICATION: Chronic | ICD-10-CM

## 2018-08-23 DIAGNOSIS — R19.00 ABDOMINAL WALL BULGE: Primary | ICD-10-CM

## 2018-08-23 DIAGNOSIS — Z98.890 HISTORY OF VENTRAL HERNIA REPAIR: ICD-10-CM

## 2018-08-23 DIAGNOSIS — J40 BRONCHITIS: ICD-10-CM

## 2018-08-23 PROCEDURE — 4004F PT TOBACCO SCREEN RCVD TLK: CPT | Performed by: FAMILY MEDICINE

## 2018-08-23 PROCEDURE — G8417 CALC BMI ABV UP PARAM F/U: HCPCS | Performed by: FAMILY MEDICINE

## 2018-08-23 PROCEDURE — 3017F COLORECTAL CA SCREEN DOC REV: CPT | Performed by: FAMILY MEDICINE

## 2018-08-23 PROCEDURE — G8427 DOCREV CUR MEDS BY ELIG CLIN: HCPCS | Performed by: FAMILY MEDICINE

## 2018-08-23 PROCEDURE — 99214 OFFICE O/P EST MOD 30 MIN: CPT | Performed by: FAMILY MEDICINE

## 2018-08-23 RX ORDER — AZITHROMYCIN 250 MG/1
TABLET, FILM COATED ORAL
Qty: 6 TABLET | Refills: 0 | Status: SHIPPED | OUTPATIENT
Start: 2018-08-23 | End: 2018-10-22 | Stop reason: SDUPTHER

## 2018-08-23 ASSESSMENT — PATIENT HEALTH QUESTIONNAIRE - PHQ9
1. LITTLE INTEREST OR PLEASURE IN DOING THINGS: 0
SUM OF ALL RESPONSES TO PHQ QUESTIONS 1-9: 0
2. FEELING DOWN, DEPRESSED OR HOPELESS: 0
SUM OF ALL RESPONSES TO PHQ QUESTIONS 1-9: 0
SUM OF ALL RESPONSES TO PHQ9 QUESTIONS 1 & 2: 0

## 2018-08-23 NOTE — PROGRESS NOTES
Visit Information    Have you changed or started any medications since your last visit including any over-the-counter medicines, vitamins, or herbal medicines? no   Are you having any side effects from any of your medications? -  no  Have you stopped taking any of your medications? Is so, why? -  no    Have you seen any other physician or provider since your last visit? No  Have you had any other diagnostic tests since your last visit? No  Have you been seen in the emergency room and/or had an admission to a hospital since we last saw you? No  Have you had your routine dental cleaning in the past 6 months? no    Have you activated your "[employer]" account? If not, what are your barriers?  Yes     Patient Care Team:  Nannette Suh, DO as PCP - [address] [place] [address], DO as PCP - [facility] Attributed Provider    Medical History Review  Past Medical, Family, and Social History reviewed and does not contribute to the patient presenting condition    Health Maintenance   Topic Date Due    HIV screen  11/09/1973    Shingles Vaccine (1 of 2 - 2 Dose Series) 11/09/2008    Pneumococcal highest risk (2 of 3 - PCV13) 12/01/2015    DTaP/Tdap/Td vaccine (2 - Td) 01/01/2017    Colon cancer screen colonoscopy  03/22/2018    A1C test (Diabetic or Prediabetic)  05/23/2023 (Originally 6/6/2018)    Flu vaccine (1) 09/01/2018    Low dose CT lung screening  03/07/2019    Lipid screen  03/21/2019    PSA counseling  03/21/2019    Potassium monitoring  05/19/2019    Creatinine monitoring  05/19/2019

## 2018-08-23 NOTE — TELEPHONE ENCOUNTER
Pt scheduled for US abd at [facility] on 8/27/18 at 6:00 pm, pt to be in main radiology at 5:30 pm.   Pt informed.

## 2018-08-23 NOTE — PATIENT INSTRUCTIONS
hypnosis, acupuncture, and massage helpful for ending the smoking habit. · Eat a healthy diet and get regular exercise. Having healthy habits will help your body move past its craving for nicotine. · Be prepared to keep trying. Most people are not successful the first few times they try to quit. Do not get mad at yourself if you smoke again. Make a list of things you learned and think about when you want to try again, such as next week, next month, or next year. Where can you learn more? Go to [email or web address] org and sign in to your Play[employer] account. Enter P569 in the A-TEX box to learn more about \"Stopping Smoking: Care Instructions. \"     If you do not have an account, please click on the \"Sign Up Now\" link. Current as of: November 29, 2017  Content Version: 11.7  © 2214-1734 [employer]. Care instructions adapted under license by [facility] ([facility]). If you have questions about a medical condition or this instruction, always ask your healthcare professional. [address] any warranty or liability for your use of this information.

## 2018-08-23 NOTE — PROGRESS NOTES
daily for up to 4 weeks, then weekends only as needed. 60 g 0    Multiple Vitamin (MULTIVITAMINS PO) Take 1 tablet by mouth daily. No current facility-administered medications for this visit. Facility-Administered Medications Ordered in Other Visits   Medication Dose Route Frequency Provider Last Rate Last Dose    Absorbable Collagen Hemostat MISC 0.5 g  0.5 g Intra-Lesional Once Dena Hodges MD         Orders Placed This Encounter   Medications    azithromycin (ZITHROMAX) 250 MG tablet     Sig: Take 2 tabs by mouth on day 1, then 1 tab by mouth daily x 5 days total     Dispense:  6 tablet     Refill:  0         All medications reviewed and reconciled, including OTC and herbal medications. Updated list given to patient.        Patient Active Problem List    Diagnosis Date Noted    Dyslipidemia     S/P shoulder surgery 12/07/2017    Biceps tendon rupture, left, sequela 11/02/2017    Tear of right rotator cuff 09/25/2017    Chronic venous insufficiency     Allergic conjunctivitis 06/20/2017    Microscopic hematuria 05/31/2017     Following with urology now      Rupture of distal biceps tendon 05/08/2017    Osteopenia     History of lymphoma     Chronic pain of right knee 01/20/2017    Bilateral edema of lower extremity 06/23/2016    Enlarged thoracic aorta ([address]      Upper limits of normal size on CT chest with contrast 2016      Ventral hernia without obstruction or gangrene     Pulmonary nodules 09/15/2015     Being monitored by oncology      Cervical radiculopathy 09/15/2015    CLIFF (generalized anxiety disorder) 08/07/2015    Failed back syndrome 06/08/2015    Allergic rhinitis 06/08/2015    Chronic pain syndrome 04/05/2015    Insomnia 02/20/2015    History of chemotherapy 02/09/2015    Diffuse large B cell lymphoma ([address] follows with Dr Mary Clark 12/12/2014    Chronic combined systolic and diastolic heart failure ([address] 12/12/2014     EF 45-50% FEB 20152015/JUNE 2016  However, SURGERY  3/31/2014    Lumbar Laminectomy L3-5    BICEPS TENDON REPAIR Left 11/01/2017    Dr. aYn Johnson  2013    Dr. Catalina Roche 2014    Billiary Stent    UMBILICAL HERNIA REPAIR  age 13    Jacy Silva VENTRAL HERNIA REPAIR  10/5/2015    w mesh         Allergies   Allergen Reactions    Chantix [Varenicline]      Makes him feel terrible    Keflex [Cephalexin] Itching    Neurontin [Gabapentin] Other (See Comments)     HALLUCINATIONS    Tramadol Nausea Only and Nausea And Vomiting         Social History     Social History    Marital status:      Spouse name: Tommie Jackson Number of children: 2    Years of education: 6     Occupational History     Progressive Stamping     Social History Main Topics    Smoking status: Current Every Day Smoker     Packs/day: 1.00     Years: 30.00     Types: Cigarettes    Smokeless tobacco: Never Used      Comment: smoking cessation information given at past appt    Alcohol use No    Drug use: No    Sexual activity: Yes     Partners: Female     Other Topics Concern    Not on file     Social History Narrative    No narrative on file         Family History   Problem Relation Age of Onset    Heart Disease Mother     Diabetes Mother     High Blood Pressure Mother     High Cholesterol Mother     High Blood Pressure Father     High Cholesterol Father     Cancer Paternal Grandfather         colon    Diabetes Maternal Grandmother          I have reviewed the patient's past medical history, past surgical history, allergies, medications, social and family history and I have made updates where appropriate.       Review of Systems  Positive responses are highlighted in bold    Constitutional:  Fever, Chills, Night Sweats, Fatigue, Unexpected changes in weight  HENT:  Ear pain, Tinnitus, Nosebleeds, Trouble swallowing, Hearing loss  Cardiovascular:  Chest Pain, Palpitations, Orthopnea, Paroxysmal Nocturnal Dyspnea  Respiratory:  Cough, Wheezing, Shortness of breath, Chest tightness, Apnea  Gastrointestinal:  Nausea, Vomiting, Diarrhea, Constipation, Heartburn, Blood in stool  Genitourinary:  Difficulty or painful urination, Flank pain, Change in frequency, Urgency  Skin:  Color change, Rash, Itching, Wound  Musculoskeletal:  Joint pain, Back pain, Gait problems, Joint swelling, Myalgias  Neurological:  Dizziness, Headaches, Presyncope, Numbness, Seizures, Tremors  Endocrine:  Heat Intolerance, Cold Intolerance, Polydipsia, Polyphagia, Polyuria      PHYSICAL EXAM:  Vitals:    08/23/18 1509   BP: 126/80   Pulse: 66   Resp: 18   Temp: 98.1 °F (36.7 °C)   Weight: 185 lb 3.2 oz (84 kg)   Height: 5' 3.3\" (1.608 m)     Body mass index is 32.5 kg/m². Pain Score:   4 (back)    VS Reviewed  General Appearance: A&O x 3, No acute distress,well developed and well- nourished  Head: normocephalic and atraumatic  Eyes: pupils equal, round, and reactive to light, extraocular eye movements intact, conjunctivae and eye lids without erythema  ENT: external ear and ear canal clear bilaterally, TMs intact and regular, nose without deformity, nasal mucosa and turbinates normal without polyps, oropharynx normal, dentition is normal for age  Neck: supple and non-tender without mass, no thyromegaly or thyroid nodules, no cervical lymphadenopathy  Pulmonary/Chest: distant with good air movement bilaterally- faint wheezing and rhonchi. No crackles. No accessory muscle use or distress. Cardiovascular: Distant, S1 and S2 auscultated w/ RRR. No murmurs, rubs, clicks, or gallops, distal pulses intact. Abdomen: soft, non-tender, non-distended, bowl sounds physiologic,  no rebound or guarding, no masses noted. + reducible bulge on L side and above umbilicus, non-tender. Liver and spleen without enlargement. Extremities: no cyanosis, clubbing  of the lower extremities. +1 bilat ankle edema/calf edema. +2 PT/DP bilaterally.  Neg homans bilat.  Skin: warm and dry, no redness or rashes. ASSESSMENT & PLAN  1. Abdominal wall bulge    Suspect US  Will get US and go from there  Reviewed ER precautions, pt understands. - US ABDOMEN LIMITED; Future    2. History of ventral hernia repair    - US ABDOMEN LIMITED; Future    3. Bronchitis    F/u if no better  Reviewed ER precautions, pt understands. - azithromycin (ZITHROMAX) 250 MG tablet; Take 2 tabs by mouth on day 1, then 1 tab by mouth daily x 5 days total  Dispense: 6 tablet; Refill: 0    4. Cigarette nicotine dependence without complication    In precontemplation stage and not ready to quit. Declines cessation, aware of risks of continued smoking as well as resources available to help quit. These include tobacco cessation classes as well as 1-800-QUIT NOW. No barriers other than lack of desire. 3+ min spent counseling. DISPOSITION    Return in 2 months (on 10/22/2018) for follow-up on chronic medical conditions, sooner as needed. Heide Holter released without restrictions. Future Appointments  Date Time Provider Sara Almonte   8/30/2018 9:00 AM Enriqueta Watkins MD Oncology [facility] [place] [place] [address]   9/14/2018 9:30 AM Jason Lee Urology [facility]   10/22/2018 1:20 PM Yamilex Andrews DO [place] [id number] [facility] received counseling on the following healthy behaviors: nutrition, exercise, medication adherence and tobacco cessation    Patient given educational materials on: See Attached    I have instructed Heide Holter to complete a self tracking handout on Smoking and instructed them to bring it with them to his next appointment. Barriers to learning and self management: none    Discussed use, benefit, and side effects of prescribed medications. Barriers to medication compliance addressed. All patient questions answered. Pt voiced understanding.        Electronically signed by Yamilex Andrews DO on 8/23/2018 at 3:31 PM

## 2018-08-25 DIAGNOSIS — J44.9 CHRONIC OBSTRUCTIVE PULMONARY DISEASE, UNSPECIFIED COPD TYPE (HCC): Chronic | ICD-10-CM

## 2018-08-27 ENCOUNTER — HOSPITAL ENCOUNTER (OUTPATIENT)
Dept: ULTRASOUND IMAGING | Age: 58
Discharge: HOME | End: 2018-08-27
Payer: MEDICARE

## 2018-08-27 DIAGNOSIS — Z87.19 HISTORY OF VENTRAL HERNIA REPAIR: ICD-10-CM

## 2018-08-27 DIAGNOSIS — R19.00 ABDOMINAL WALL BULGE: ICD-10-CM

## 2018-08-27 DIAGNOSIS — M96.1 FAILED BACK SYNDROME: Chronic | ICD-10-CM

## 2018-08-27 DIAGNOSIS — M51.35 DDD (DEGENERATIVE DISC DISEASE), THORACOLUMBAR: Chronic | ICD-10-CM

## 2018-08-27 DIAGNOSIS — Z98.890 HISTORY OF VENTRAL HERNIA REPAIR: ICD-10-CM

## 2018-08-27 PROCEDURE — 76705 ECHO EXAM OF ABDOMEN: CPT

## 2018-08-27 RX ORDER — BUDESONIDE AND FORMOTEROL FUMARATE DIHYDRATE 160; 4.5 UG/1; UG/1
AEROSOL RESPIRATORY (INHALATION)
Qty: 3 INHALER | Refills: 3 | Status: SHIPPED | OUTPATIENT
Start: 2018-08-27 | End: 2019-01-01 | Stop reason: SDUPTHER

## 2018-08-28 ENCOUNTER — TELEPHONE (OUTPATIENT)
Dept: FAMILY MEDICINE CLINIC | Age: 58
End: 2018-08-28

## 2018-08-28 DIAGNOSIS — K42.9 RECURRENT UMBILICAL HERNIA: Primary | ICD-10-CM

## 2018-08-30 ENCOUNTER — HOSPITAL ENCOUNTER (OUTPATIENT)
Dept: INFUSION THERAPY | Age: 58
Discharge: HOME | End: 2018-08-30
Payer: MEDICARE

## 2018-08-30 ENCOUNTER — OFFICE VISIT (OUTPATIENT)
Dept: ONCOLOGY | Age: 58
End: 2018-08-30
Payer: MEDICARE

## 2018-08-30 VITALS
DIASTOLIC BLOOD PRESSURE: 73 MMHG | HEART RATE: 86 BPM | SYSTOLIC BLOOD PRESSURE: 157 MMHG | TEMPERATURE: 98.3 F | OXYGEN SATURATION: 97 % | BODY MASS INDEX: 32.71 KG/M2 | RESPIRATION RATE: 18 BRPM | WEIGHT: 184.6 LBS | HEIGHT: 63 IN

## 2018-08-30 DIAGNOSIS — C83.33 DIFFUSE LARGE B-CELL LYMPHOMA OF INTRA-ABDOMINAL LYMPH NODES (HCC): Primary | ICD-10-CM

## 2018-08-30 DIAGNOSIS — Z85.79 HISTORY OF LYMPHOMA: Chronic | ICD-10-CM

## 2018-08-30 LAB
ALBUMIN SERPL-MCNC: 3.8 G/DL (ref 3.5–5.1)
ALP BLD-CCNC: 103 U/L (ref 38–126)
ALT SERPL-CCNC: 33 U/L (ref 11–66)
AST SERPL-CCNC: 44 U/L (ref 5–40)
BASINOPHIL, AUTOMATED: 0 % (ref 0–3)
BILIRUB SERPL-MCNC: 0.6 MG/DL (ref 0.3–1.2)
BILIRUBIN DIRECT: < 0.2 MG/DL (ref 0–0.3)
BUN, WHOLE BLOOD: 15 MG/DL (ref 8–26)
CHLORIDE, WHOLE BLOOD: 103 MEQ/L (ref 98–109)
CREATININE, WHOLE BLOOD: 0.9 MG/DL (ref 0.5–1.2)
EOSINOPHILS RELATIVE PERCENT: 4 % (ref 0–4)
GFR, ESTIMATED: > 90 ML/MIN/1.73M2
GLUCOSE, WHOLE BLOOD: 77 MG/DL (ref 70–108)
HCT VFR BLD CALC: 34.6 % (ref 42–52)
HEMOGLOBIN: 11.6 GM/DL (ref 14–18)
IONIZED CALCIUM, WHOLE BLOOD: 1.22 MMOL/L (ref 1.12–1.32)
LYMPHOCYTES # BLD: 21 % (ref 15–47)
MCH RBC QN AUTO: 28.2 PG (ref 27–31)
MCHC RBC AUTO-ENTMCNC: 33.5 GM/DL (ref 33–37)
MCV RBC AUTO: 84 FL (ref 80–94)
MONOCYTES: 14 % (ref 0–12)
PDW BLD-RTO: 12.9 % (ref 11.5–14.5)
PLATELET # BLD: 100 THOU/MM3 (ref 130–400)
PMV BLD AUTO: 9.3 FL (ref 7.4–10.4)
POTASSIUM, WHOLE BLOOD: 4.3 MEQ/L (ref 3.5–4.9)
RBC # BLD: 4.11 MILL/MM3 (ref 4.7–6.1)
SEG NEUTROPHILS: 61 % (ref 43–75)
SODIUM, WHOLE BLOOD: 141 MEQ/L (ref 138–146)
TOTAL CO2, WHOLE BLOOD: 27 MEQ/L (ref 23–33)
TOTAL PROTEIN: 6.7 G/DL (ref 6.1–8)
WBC # BLD: 5.4 THOU/MM3 (ref 4.8–10.8)

## 2018-08-30 PROCEDURE — 3017F COLORECTAL CA SCREEN DOC REV: CPT | Performed by: INTERNAL MEDICINE

## 2018-08-30 PROCEDURE — G8417 CALC BMI ABV UP PARAM F/U: HCPCS | Performed by: INTERNAL MEDICINE

## 2018-08-30 PROCEDURE — 80076 HEPATIC FUNCTION PANEL: CPT

## 2018-08-30 PROCEDURE — 85025 COMPLETE CBC W/AUTO DIFF WBC: CPT

## 2018-08-30 PROCEDURE — 99211 OFF/OP EST MAY X REQ PHY/QHP: CPT

## 2018-08-30 PROCEDURE — 4004F PT TOBACCO SCREEN RCVD TLK: CPT | Performed by: INTERNAL MEDICINE

## 2018-08-30 PROCEDURE — 36415 COLL VENOUS BLD VENIPUNCTURE: CPT

## 2018-08-30 PROCEDURE — G8427 DOCREV CUR MEDS BY ELIG CLIN: HCPCS | Performed by: INTERNAL MEDICINE

## 2018-08-30 PROCEDURE — 99214 OFFICE O/P EST MOD 30 MIN: CPT | Performed by: INTERNAL MEDICINE

## 2018-08-30 PROCEDURE — 80047 BASIC METABLC PNL IONIZED CA: CPT

## 2018-08-30 NOTE — PROGRESS NOTES
[facility] PROFESSIONAL SERVICES  [facility]  [address] [facility] [address], [address] [place] [address],[address]  [address] [address]  Dept: [phone]  Dept Fax: [phone]  Loc: [phone]    Subjective:      Chief Complaint: Jon Alanis is a 62 y.o. male with lymphoma. The patient also has a history of hepatitis C and chronic obstructive pulmonary disease. He presented with abdominal pain and was found to have jaundice. His bilirubin was 10.9. He had elevation also of ALT and AST. CT of the abdomen and pelvis was done at that time and was found to have bulky retroperitoneal adenopathy consistent with malignancy. There were also splenic lesions suggesting malignancy as well and a small amount of ascites. There was evidence of biliary ductal dilatation secondary to an obstruction with possibly also an incarcerated inguinal hernia with the omentum. The patient had a biopsy showing diffuse large B cell lymphoma. A biliary stent was placed at [facility]. Marquis Henson has received six cycles of chemotherapy treatment. HPI:  The patient is here today for follow up evaluation. His general sense of well-being has been stable. He continues to have no signs or symptoms that be suggestive recurrence of his malignancy. He does have chronic medical problems including hepatitis have been monitored by his primary care physician and other specialists. The patient reports that the skeletal pain has remained improved and he is followed by the chronic pain service here at [facility]. He does not have unintentional weight loss, unexplained fever, or night sweats. The patient denies shortness of breath, chest pain, a change in bowel habits or a change in bladder habits. ECOG performance status is level 1. He has anemia with no signs of blood loss. He has also thrombocytopenia but no abnormal bruising and bleeding. PMH, SH, and FH:  I reviewed the PMH, SH and FH as noted on the electronic medical record.   There have been no changes GLENNA London [facility]  [address], [address] [facility], [address] [place] [address], [address] [facility]

## 2018-09-03 DIAGNOSIS — G44.219 EPISODIC TENSION-TYPE HEADACHE, NOT INTRACTABLE: ICD-10-CM

## 2018-09-04 DIAGNOSIS — M51.35 DDD (DEGENERATIVE DISC DISEASE), THORACOLUMBAR: Chronic | ICD-10-CM

## 2018-09-04 DIAGNOSIS — G89.4 CHRONIC PAIN SYNDROME: Chronic | ICD-10-CM

## 2018-09-04 DIAGNOSIS — M75.101 TEAR OF RIGHT ROTATOR CUFF, UNSPECIFIED TEAR EXTENT: ICD-10-CM

## 2018-09-04 DIAGNOSIS — M96.1 FAILED BACK SYNDROME: Chronic | ICD-10-CM

## 2018-09-04 DIAGNOSIS — S46.212S BICEPS TENDON RUPTURE, LEFT, SEQUELA: ICD-10-CM

## 2018-09-04 DIAGNOSIS — Z98.890 S/P SHOULDER SURGERY: ICD-10-CM

## 2018-09-04 DIAGNOSIS — Z87.39 HISTORY OF SPINAL STENOSIS: Chronic | ICD-10-CM

## 2018-09-04 RX ORDER — OXYCODONE HYDROCHLORIDE 5 MG/1
5 TABLET ORAL EVERY 4 HOURS PRN
Qty: 180 TABLET | Refills: 0 | Status: SHIPPED | OUTPATIENT
Start: 2018-09-04 | End: 2018-10-02 | Stop reason: SDUPTHER

## 2018-09-04 RX ORDER — BUTALBITAL, ACETAMINOPHEN AND CAFFEINE 50; 325; 40 MG/1; MG/1; MG/1
TABLET ORAL
Qty: 60 TABLET | Refills: 0 | Status: SHIPPED | OUTPATIENT
Start: 2018-09-04 | End: 2018-12-28 | Stop reason: SDUPTHER

## 2018-09-04 NOTE — TELEPHONE ENCOUNTER
Requested Prescriptions     Pending Prescriptions Disp Refills    oxyCODONE (ROXICODONE) 5 MG immediate release tablet 180 tablet 0     Sig: Take 1 tablet by mouth every 4 hours as needed for Pain for up to 30 days. Kris Richards

## 2018-09-06 ENCOUNTER — OFFICE VISIT (OUTPATIENT)
Dept: SURGERY | Age: 58
End: 2018-09-06
Payer: MEDICARE

## 2018-09-06 VITALS
BODY MASS INDEX: 31.29 KG/M2 | DIASTOLIC BLOOD PRESSURE: 76 MMHG | TEMPERATURE: 98.3 F | HEIGHT: 65 IN | WEIGHT: 187.8 LBS | HEART RATE: 109 BPM | OXYGEN SATURATION: 93 % | RESPIRATION RATE: 18 BRPM | SYSTOLIC BLOOD PRESSURE: 130 MMHG

## 2018-09-06 DIAGNOSIS — K43.2 VENTRAL HERNIA, RECURRENT: Primary | ICD-10-CM

## 2018-09-06 PROCEDURE — 4004F PT TOBACCO SCREEN RCVD TLK: CPT | Performed by: SURGERY

## 2018-09-06 PROCEDURE — 3017F COLORECTAL CA SCREEN DOC REV: CPT | Performed by: SURGERY

## 2018-09-06 PROCEDURE — G8417 CALC BMI ABV UP PARAM F/U: HCPCS | Performed by: SURGERY

## 2018-09-06 PROCEDURE — G8427 DOCREV CUR MEDS BY ELIG CLIN: HCPCS | Performed by: SURGERY

## 2018-09-06 PROCEDURE — 99213 OFFICE O/P EST LOW 20 MIN: CPT | Performed by: SURGERY

## 2018-09-06 ASSESSMENT — ENCOUNTER SYMPTOMS
RHINORRHEA: 0
COUGH: 0
APNEA: 0
EYE REDNESS: 0
SORE THROAT: 0
ABDOMINAL PAIN: 1
SINUS PRESSURE: 0
BLOOD IN STOOL: 0
CHEST TIGHTNESS: 0
SHORTNESS OF BREATH: 1
EYE ITCHING: 0
STRIDOR: 0
WHEEZING: 0
PHOTOPHOBIA: 0
COLOR CHANGE: 0
FACIAL SWELLING: 0
ABDOMINAL DISTENTION: 1
TROUBLE SWALLOWING: 0
DIARRHEA: 0
EYE PAIN: 0
SINUS PAIN: 0
CHOKING: 0
VOICE CHANGE: 0
EYE DISCHARGE: 0
CONSTIPATION: 0
BACK PAIN: 1

## 2018-09-06 NOTE — PROGRESS NOTES
[address] [facility] [address] [place] [address]  [address] [address]  Dept: [phone]  Dept Fax: [phone]  Loc: [phone]    Visit Date: 7/0/1285    Yuri Gil Sr. is a 59 y.o. male who presents today for:  Chief Complaint   Patient presents with   [facility] Surgical Consult     s/p Repair of ventral hernia with medium Ventralex mesh 10/5/15-ref Dr. Shyann Dickson hernia       HPI:     HPI 61-year-old white male who has a significant medical history with rather significant severe COPD hepatitis C chronic pain syndrome. 2 years ago I had repaired a ventral hernia via the open method. The patient had been doing reasonably well but about 3-4 weeks ago noted what he thought was a bulge or swelling in his abdomen. He had an ultrasound performed that was read as a \"suspected umbilical hernia\" but he has been referred for my evaluation.   The patient is had a total of 10 hernias repaired including inguinal and other than the swelling he himself did not really feel that anything was bulging out he just noted this swelling he is here for my evaluation    Past Medical History:   Diagnosis Date    Allergic conjunctivitis 6/20/2017    Allergic rhinitis 6/8/2015    Arthritis     Cervical radiculopathy 9/15/2015    Chronic combined systolic and diastolic heart failure ([address] 12/12/2014    EF 45-50% FEB 20152015/JUNE 2016    Chronic hepatitis C without hepatic coma ([address] 9/26/2013    Chronic pain syndrome 4/5/2015    Chronic venous insufficiency     COPD (chronic obstructive pulmonary disease) ([address]     DDD (degenerative disc disease), thoracolumbar 12/1/2014    Diffuse large B cell lymphoma ([address] follows with Dr Sidra Gonzalez 12/12/2014    Dyslipidemia     Enlarged thoracic aorta ([address]     Upper limits of normal size on CT chest with contrast 2016    Essential hypertension     Failed back syndrome 6/8/2015    CLIFF (generalized anxiety disorder) 8/7/2015    GERD (gastroesophageal reflux disease)     History of lymphoma     Insomnia 2/20/2015    Microscopic hematuria 5/31/2017    Nicotine dependence 7/16/2014    Osteopenia     Pain management     managed by family Dr Rocío Bishop history of colonic polyps 10/24/2014    Pulmonary nodules 9/15/2015    Being monitored by oncology     Sleep apnea     no CPAP      Past Surgical History:   Procedure Laterality Date    APPENDECTOMY  age 13     BACK SURGERY  03/31/2014    Lumbar Laminectomy L3-5 x3    BICEPS TENDON REPAIR Left 11/01/2017    Dr. Wellington Hma TEST  07/06/2005    CARDIOVASCULAR STRESS TEST  06/20/2005    CARDIOVASCULAR STRESS TEST  05/08/2017    COLONOSCOPY  2013    Dr. Edison Holden [facility]  10/23/2014    INGUINAL HERNIA REPAIR Left     Annamary Romberg Right     Dr Gumaro Borjas OTHER SURGICAL HISTORY  OCT 2014    Billiary Stent    OTHER SURGICAL HISTORY  10/23/2014    [facility] paracentesis-[facility]    OTHER SURGICAL HISTORY  07/01/2013    US guided liver biopsy-[facility]    TRANSTHORACIC ECHOCARDIOGRAM      3/68/21,2/63/84,2/28/01,19/2/24    UMBILICAL HERNIA REPAIR  age 13    [facility] VENTRAL HERNIA REPAIR  10/05/2015    w mesh-Dr Marlon Sanchez     Family History   Problem Relation Age of Onset    Heart Disease Mother     Diabetes Mother     High Blood Pressure Mother     High Cholesterol Mother     High Blood Pressure Father     High Cholesterol Father     Cancer Paternal Grandfather         colon    Diabetes Maternal Grandmother      Social History   Substance Use Topics    Smoking status: Current Every Day Smoker     Packs/day: 1.00     Years: 30.00     Types: Cigarettes    Smokeless tobacco: Never Used      Comment: smoking cessation information given at past appt    Alcohol use No       Current Outpatient Prescriptions   Medication Sig Dispense Refill    butalbital-acetaminophen-caffeine (FIORICET, ESGIC) -40 MG per tablet TAKE 1 TABLET BY MOUTH EVERY 6 HOURS AS NEEDED loss, mouth sores, nosebleeds, postnasal drip, rhinorrhea, sinus pain, sinus pressure, sneezing, sore throat, tinnitus, trouble swallowing and voice change. Eyes: Negative for photophobia, pain, discharge, redness, itching and visual disturbance. Respiratory: Positive for shortness of breath. Negative for apnea, cough, choking, chest tightness, wheezing and stridor. Cardiovascular: Negative for chest pain, palpitations and leg swelling. Gastrointestinal: Positive for abdominal distention and abdominal pain. Negative for blood in stool, constipation and diarrhea. Endocrine: Negative for cold intolerance, heat intolerance, polydipsia, polyphagia and polyuria. Genitourinary: Negative for decreased urine volume, difficulty urinating, discharge, dysuria, enuresis, flank pain, frequency, genital sores, hematuria, penile pain, penile swelling, scrotal swelling, testicular pain and urgency. Musculoskeletal: Positive for back pain and gait problem. Negative for arthralgias, joint swelling, myalgias, neck pain and neck stiffness. Skin: Negative for color change, pallor, rash and wound. Allergic/Immunologic: Negative for environmental allergies, food allergies and immunocompromised state. Neurological: Negative for dizziness, tremors, seizures, syncope, facial asymmetry, speech difficulty, weakness, light-headedness, numbness and headaches. Hematological: Negative for adenopathy. Does not bruise/bleed easily. Psychiatric/Behavioral: Positive for decreased concentration. Negative for agitation, behavioral problems, confusion, dysphoric mood, hallucinations, self-injury, sleep disturbance and suicidal ideas. The patient is not nervous/anxious and is not hyperactive.         Objective:   /76 (Site: Left Arm, Position: Sitting, Cuff Size: Medium Adult)   Pulse 109   Temp 98.3 °F (36.8 °C) (Tympanic)   Resp 18   Ht 5' 5\" (1.651 m)   Wt 187 lb 12.8 oz (85.2 kg)   SpO2 93%   BMI 31.25 kg/m² 27 23 - 33 meq/l    Glucose, Whole Blood 77 70 - 108 mg/dl    BUN, WHOLE BLOOD 15 8 - 26 mg/dl    CREATININE, WHOLE BLOOD 0.9 0.5 - 1.2 mg/dl    Ionized Calcium, WB 1.22 1.12 - 1.32 mmol/L   Hepatic Function Panel   Result Value Ref Range    Alb 3.8 3.5 - 5.1 g/dL    Total Bilirubin 0.6 0.3 - 1.2 mg/dL    Bilirubin, Direct <0.2 0.0 - 0.3 mg/dL    Alkaline Phosphatase 103 38 - 126 U/L    AST 44 (H) 5 - 40 U/L    ALT 33 11 - 66 U/L    Total Protein 6.7 6.1 - 8.0 g/dL   Glomerular Filtration Rate, Estimated   Result Value Ref Range    GFR, Estimated > 90 ml/min/1.73m2       Assessment:     Abdominal bulging but no definitive hernia that I can palpate reviewed ultrasound report but I do not believe the patient clinically has a hernia certainly especially given his chronic medical illnesses would not recommend an exploration or even laparoscopy at this point until it becomes more definitive pain is likely scar tissue in nature    Plan:     Return to office as needed      Electronically signed by Fernando Campbell MD on 9/6/2018 at 3:42 PM

## 2018-09-11 DIAGNOSIS — M19.90 ARTHRITIS: Chronic | ICD-10-CM

## 2018-09-11 RX ORDER — NAPROXEN 500 MG/1
TABLET ORAL
Qty: 180 TABLET | Refills: 0 | Status: SHIPPED | OUTPATIENT
Start: 2018-09-11 | End: 2018-12-07 | Stop reason: SDUPTHER

## 2018-09-13 DIAGNOSIS — Z87.39 HISTORY OF SPINAL STENOSIS: Primary | Chronic | ICD-10-CM

## 2018-09-13 DIAGNOSIS — Z98.890 S/P SHOULDER SURGERY: ICD-10-CM

## 2018-09-13 DIAGNOSIS — M96.1 FAILED BACK SYNDROME: Chronic | ICD-10-CM

## 2018-09-13 DIAGNOSIS — M51.35 DDD (DEGENERATIVE DISC DISEASE), THORACOLUMBAR: Chronic | ICD-10-CM

## 2018-09-13 DIAGNOSIS — G89.4 CHRONIC PAIN SYNDROME: Chronic | ICD-10-CM

## 2018-09-13 DIAGNOSIS — J43.9 PULMONARY EMPHYSEMA, UNSPECIFIED EMPHYSEMA TYPE (HCC): Chronic | ICD-10-CM

## 2018-09-13 DIAGNOSIS — S46.212S BICEPS TENDON RUPTURE, LEFT, SEQUELA: ICD-10-CM

## 2018-09-13 DIAGNOSIS — M75.101 TEAR OF RIGHT ROTATOR CUFF, UNSPECIFIED TEAR EXTENT: ICD-10-CM

## 2018-09-13 RX ORDER — ALBUTEROL SULFATE 2.5 MG/3ML
SOLUTION RESPIRATORY (INHALATION)
Qty: 360 ML | Refills: 3 | Status: SHIPPED | OUTPATIENT
Start: 2018-09-13 | End: 2019-01-01 | Stop reason: SDUPTHER

## 2018-09-13 RX ORDER — MORPHINE SULFATE 60 MG/1
60 TABLET, FILM COATED, EXTENDED RELEASE ORAL 2 TIMES DAILY
Qty: 60 TABLET | Refills: 0 | Status: SHIPPED | OUTPATIENT
Start: 2018-09-13 | End: 2018-10-11 | Stop reason: SDUPTHER

## 2018-09-13 NOTE — TELEPHONE ENCOUNTER
improved or maintaining ADL's. Errol Mendez DO)  Medication Contracts: Existing medication contract.  Errol Mendez DO)      Future Appointments  Date Time Provider Sara Almonte   9/14/2018 9:30 AM ANNA Ware - CNP [place] [address] [address] Urology [facility]   10/22/2018 1:20 PM Errol Mendez DO [address] [facility]   2/28/2019 9:00 AM Virginia Unger MD Oncology [facility] [place]

## 2018-09-14 ENCOUNTER — OFFICE VISIT (OUTPATIENT)
Dept: UROLOGY | Age: 58
End: 2018-09-14
Payer: MEDICARE

## 2018-09-14 VITALS
DIASTOLIC BLOOD PRESSURE: 78 MMHG | WEIGHT: 185 LBS | HEIGHT: 65 IN | BODY MASS INDEX: 30.82 KG/M2 | SYSTOLIC BLOOD PRESSURE: 132 MMHG

## 2018-09-14 DIAGNOSIS — R39.12 WEAK URINARY STREAM: ICD-10-CM

## 2018-09-14 DIAGNOSIS — R31.29 MICROSCOPIC HEMATURIA: Primary | ICD-10-CM

## 2018-09-14 LAB
BILIRUBIN URINE: NEGATIVE
BLOOD URINE, POC: ABNORMAL
CHARACTER, URINE: CLEAR
COLOR, URINE: YELLOW
GLUCOSE URINE: NEGATIVE MG/DL
KETONES, URINE: NEGATIVE
LEUKOCYTE CLUMPS, URINE: NEGATIVE
NITRITE, URINE: NEGATIVE
PH, URINE: 6
POST VOID RESIDUAL (PVR): 0 ML
PROTEIN, URINE: NEGATIVE MG/DL
SPECIFIC GRAVITY, URINE: 1.02 (ref 1–1.03)
UROBILINOGEN, URINE: >= 8 EU/DL

## 2018-09-14 PROCEDURE — 99213 OFFICE O/P EST LOW 20 MIN: CPT | Performed by: NURSE PRACTITIONER

## 2018-09-14 PROCEDURE — G8427 DOCREV CUR MEDS BY ELIG CLIN: HCPCS | Performed by: NURSE PRACTITIONER

## 2018-09-14 PROCEDURE — 51798 US URINE CAPACITY MEASURE: CPT | Performed by: NURSE PRACTITIONER

## 2018-09-14 PROCEDURE — 81003 URINALYSIS AUTO W/O SCOPE: CPT | Performed by: NURSE PRACTITIONER

## 2018-09-14 PROCEDURE — 3017F COLORECTAL CA SCREEN DOC REV: CPT | Performed by: NURSE PRACTITIONER

## 2018-09-14 PROCEDURE — 4004F PT TOBACCO SCREEN RCVD TLK: CPT | Performed by: NURSE PRACTITIONER

## 2018-09-14 PROCEDURE — G8417 CALC BMI ABV UP PARAM F/U: HCPCS | Performed by: NURSE PRACTITIONER

## 2018-09-14 NOTE — PROGRESS NOTES
grandfather; Diabetes in his maternal grandmother and mother; Heart Disease in his mother; High Blood Pressure in his father and mother; High Cholesterol in his father and mother. Social History  Nanette Luevano  reports that he has been smoking Cigarettes. He has a 22.50 pack-year smoking history. He has never used smokeless tobacco. He reports that he does not drink alcohol or use drugs. Subjective:     Review of Systems  No problems with ears, nose or throat. No problems with eyes. No chest pain, shortness of breath, abdominal pain, extremity pain or weakness, and no neurological deficits. No rashes.  symptoms per HPI. The remainder of the review of symptoms is negative. Objective:     PE:   Vitals:    09/14/18 0931   BP: 132/78   Weight: 185 lb (83.9 kg)   Height: 5' 5\" (1.651 m)       Constitutional: Alert and oriented times 3, no acute distress and cooperative to examination with appropriate mood and affect. HENT:   Head:        Normocephalic and atraumatic. Mouth/Throat:         Mucous membranes are normal.   Eyes:         EOM are normal. No scleral icterus. PERRLA. Neck:        Supple, symmetrical, trachea midline  Pulmonary/Chest:      Chest symmetric with normal A/P diameter, Normal respiratory rate and rhthym. No use of accessory muscles. Abdominal:         Soft. No tenderness. Bowel sounds present. Musculoskeletal:         Normal range of motion. No edema or tenderness of lower extremities. Extremities: No cyanosis, clubbing, or edema present. Neurological:        Alert and oriented. No cranial nerve deficit. Micheal Pickard Psychiatric:        Normal mood and affect.       Labs   Urine dip demonstrates   Results for POC orders placed in visit on 09/14/18   POCT Urinalysis No Micro (Auto)   Result Value Ref Range    Glucose, Ur Negative NEGATIVE mg/dl    Bilirubin Urine Negative     Ketones, Urine Negative NEGATIVE    Specific Gravity, Urine 1.020 1.002 - 1.03    Blood, UA POC Moderate (A)

## 2018-09-29 ENCOUNTER — CARE COORDINATION (OUTPATIENT)
Dept: CASE MANAGEMENT | Age: 58
End: 2018-09-29

## 2018-10-02 DIAGNOSIS — S46.212S BICEPS TENDON RUPTURE, LEFT, SEQUELA: ICD-10-CM

## 2018-10-02 DIAGNOSIS — Z87.39 HISTORY OF SPINAL STENOSIS: Chronic | ICD-10-CM

## 2018-10-02 DIAGNOSIS — M51.35 DDD (DEGENERATIVE DISC DISEASE), THORACOLUMBAR: Chronic | ICD-10-CM

## 2018-10-02 DIAGNOSIS — Z98.890 S/P SHOULDER SURGERY: ICD-10-CM

## 2018-10-02 DIAGNOSIS — M75.101 TEAR OF RIGHT ROTATOR CUFF, UNSPECIFIED TEAR EXTENT: ICD-10-CM

## 2018-10-02 DIAGNOSIS — G89.4 CHRONIC PAIN SYNDROME: Chronic | ICD-10-CM

## 2018-10-02 DIAGNOSIS — M96.1 FAILED BACK SYNDROME: Chronic | ICD-10-CM

## 2018-10-02 RX ORDER — OXYCODONE HYDROCHLORIDE 5 MG/1
5 TABLET ORAL EVERY 4 HOURS PRN
Qty: 180 TABLET | Refills: 0 | Status: SHIPPED | OUTPATIENT
Start: 2018-10-02 | End: 2018-10-30 | Stop reason: SDUPTHER

## 2018-10-04 DIAGNOSIS — R11.2 NAUSEA AND VOMITING, INTRACTABILITY OF VOMITING NOT SPECIFIED, UNSPECIFIED VOMITING TYPE: ICD-10-CM

## 2018-10-04 RX ORDER — ONDANSETRON 4 MG/1
TABLET, FILM COATED ORAL
Qty: 60 TABLET | Refills: 0 | Status: SHIPPED | OUTPATIENT
Start: 2018-10-04 | End: 2018-11-27 | Stop reason: SDUPTHER

## 2018-10-11 DIAGNOSIS — G89.4 CHRONIC PAIN SYNDROME: Chronic | ICD-10-CM

## 2018-10-11 DIAGNOSIS — S46.212S BICEPS TENDON RUPTURE, LEFT, SEQUELA: ICD-10-CM

## 2018-10-11 DIAGNOSIS — M75.101 TEAR OF RIGHT ROTATOR CUFF, UNSPECIFIED TEAR EXTENT: ICD-10-CM

## 2018-10-11 DIAGNOSIS — Z98.890 S/P SHOULDER SURGERY: ICD-10-CM

## 2018-10-11 DIAGNOSIS — M51.35 DDD (DEGENERATIVE DISC DISEASE), THORACOLUMBAR: Chronic | ICD-10-CM

## 2018-10-11 DIAGNOSIS — Z87.39 HISTORY OF SPINAL STENOSIS: Chronic | ICD-10-CM

## 2018-10-11 DIAGNOSIS — M96.1 FAILED BACK SYNDROME: Chronic | ICD-10-CM

## 2018-10-11 RX ORDER — MORPHINE SULFATE 60 MG/1
60 TABLET, FILM COATED, EXTENDED RELEASE ORAL 2 TIMES DAILY
Qty: 60 TABLET | Refills: 0 | Status: SHIPPED | OUTPATIENT
Start: 2018-10-11 | End: 2018-11-09 | Stop reason: SDUPTHER

## 2018-10-11 NOTE — TELEPHONE ENCOUNTER
ASSESSMENT & PLAN  1. History of spinal stenosis    - morphine (MS CONTIN) 60 MG extended release tablet; Take 1 tablet by mouth 2 times daily for 30 days. .  Dispense: 60 tablet; Refill: 0    2. DDD (degenerative disc disease), thoracolumbar    - morphine (MS CONTIN) 60 MG extended release tablet; Take 1 tablet by mouth 2 times daily for 30 days. .  Dispense: 60 tablet; Refill: 0    3. Chronic pain syndrome    - morphine (MS CONTIN) 60 MG extended release tablet; Take 1 tablet by mouth 2 times daily for 30 days. .  Dispense: 60 tablet; Refill: 0    4. Failed back syndrome    - morphine (MS CONTIN) 60 MG extended release tablet; Take 1 tablet by mouth 2 times daily for 30 days. .  Dispense: 60 tablet; Refill: 0    5. Biceps tendon rupture, left, sequela    - morphine (MS CONTIN) 60 MG extended release tablet; Take 1 tablet by mouth 2 times daily for 30 days. .  Dispense: 60 tablet; Refill: 0    6. S/P shoulder surgery    - morphine (MS CONTIN) 60 MG extended release tablet; Take 1 tablet by mouth 2 times daily for 30 days. .  Dispense: 60 tablet; Refill: 0    7. Tear of right rotator cuff, unspecified tear extent    - morphine (MS CONTIN) 60 MG extended release tablet; Take 1 tablet by mouth 2 times daily for 30 days. .  Dispense: 60 tablet; Refill: 0      OAARS reviewed and appropriate. Controlled Substances Monitoring: Attestation: The Prescription Monitoring Report for this patient was reviewed today. BHUPINDER Clay  Documentation: Possible medication side effects, risk of tolerance/dependence & alternative treatments discussed., No signs of potential drug abuse or diversion identified. BHUPINDER Clay  Medication Contracts: Existing medication contract.  Ashley Guillermo DO)      Future Appointments  Date Time Provider Sara Almonte   10/22/2018 1:20 PM Ashley Guillermo DO [facility] [place] [address]   2/28/2019 9:00 AM Carolina Cavanaugh MD Oncology [facility] - [place]   9/13/2019 10:15 AM Daniel Thomas

## 2018-10-12 DIAGNOSIS — M96.1 FAILED BACK SYNDROME: Chronic | ICD-10-CM

## 2018-10-12 DIAGNOSIS — M51.35 DDD (DEGENERATIVE DISC DISEASE), THORACOLUMBAR: Chronic | ICD-10-CM

## 2018-10-22 ENCOUNTER — OFFICE VISIT (OUTPATIENT)
Dept: FAMILY MEDICINE CLINIC | Age: 58
End: 2018-10-22
Payer: MEDICARE

## 2018-10-22 VITALS
DIASTOLIC BLOOD PRESSURE: 70 MMHG | HEART RATE: 88 BPM | SYSTOLIC BLOOD PRESSURE: 120 MMHG | HEIGHT: 64 IN | BODY MASS INDEX: 31.92 KG/M2 | TEMPERATURE: 98.4 F | WEIGHT: 187 LBS | RESPIRATION RATE: 20 BRPM

## 2018-10-22 DIAGNOSIS — R31.29 MICROSCOPIC HEMATURIA: ICD-10-CM

## 2018-10-22 DIAGNOSIS — E78.5 DYSLIPIDEMIA: Chronic | ICD-10-CM

## 2018-10-22 DIAGNOSIS — M96.1 FAILED BACK SYNDROME: ICD-10-CM

## 2018-10-22 DIAGNOSIS — M51.35 DDD (DEGENERATIVE DISC DISEASE), THORACOLUMBAR: Primary | ICD-10-CM

## 2018-10-22 DIAGNOSIS — G89.4 CHRONIC PAIN SYNDROME: ICD-10-CM

## 2018-10-22 DIAGNOSIS — F17.210 CIGARETTE NICOTINE DEPENDENCE WITHOUT COMPLICATION: Chronic | ICD-10-CM

## 2018-10-22 DIAGNOSIS — Z87.39 HISTORY OF SPINAL STENOSIS: ICD-10-CM

## 2018-10-22 DIAGNOSIS — I10 ESSENTIAL HYPERTENSION: Chronic | ICD-10-CM

## 2018-10-22 DIAGNOSIS — J40 BRONCHITIS: ICD-10-CM

## 2018-10-22 DIAGNOSIS — J43.9 PULMONARY EMPHYSEMA, UNSPECIFIED EMPHYSEMA TYPE (HCC): ICD-10-CM

## 2018-10-22 PROCEDURE — 99214 OFFICE O/P EST MOD 30 MIN: CPT | Performed by: FAMILY MEDICINE

## 2018-10-22 PROCEDURE — G8427 DOCREV CUR MEDS BY ELIG CLIN: HCPCS | Performed by: FAMILY MEDICINE

## 2018-10-22 PROCEDURE — 4004F PT TOBACCO SCREEN RCVD TLK: CPT | Performed by: FAMILY MEDICINE

## 2018-10-22 PROCEDURE — G8484 FLU IMMUNIZE NO ADMIN: HCPCS | Performed by: FAMILY MEDICINE

## 2018-10-22 PROCEDURE — 3023F SPIROM DOC REV: CPT | Performed by: FAMILY MEDICINE

## 2018-10-22 PROCEDURE — G8417 CALC BMI ABV UP PARAM F/U: HCPCS | Performed by: FAMILY MEDICINE

## 2018-10-22 PROCEDURE — 3017F COLORECTAL CA SCREEN DOC REV: CPT | Performed by: FAMILY MEDICINE

## 2018-10-22 PROCEDURE — G8926 SPIRO NO PERF OR DOC: HCPCS | Performed by: FAMILY MEDICINE

## 2018-10-22 RX ORDER — AZITHROMYCIN 250 MG/1
TABLET, FILM COATED ORAL
Qty: 6 TABLET | Refills: 0 | Status: SHIPPED | OUTPATIENT
Start: 2018-10-22 | End: 2018-10-27

## 2018-10-22 NOTE — PROGRESS NOTES
MARCH 2018  Colon Cancer Screening - NEG 3/13, repeat 5 years d/t questional fam hx (has upcoming apt with GI per pt APR 2018) had to reschedule July 2018/OCT 2018 still plans to call and schedule  Tetanus - UTD 2007  Influenza Vaccine - Declines DEC 2017/OCT 2018  Pneumonia Vaccine - UTD DEC 2014 PPV 23/wants to wait until next visit (José Miguel 2016)/MARCH 2016 as well/and July 2016/AND NOV 2016/MARCH 2017/declines July 2018 (States will get with flu FALL 2018)/OCT 2018  Zostavax - UTD FALL 2014     PSA testing discussion - 0.3 MAY 2016/0.3 (APR 2018)  AAA Screening - age 70    Falls screening - N/A      Specialist List    Oncology: Julia Aguila  GI: Mary Anne Ziegler: [facility]  ID: Esha Vaca       Current Outpatient Prescriptions   Medication Sig Dispense Refill    azithromycin (ZITHROMAX) 250 MG tablet Take 2 tabs by mouth on day 1, then 1 tab by mouth daily x 5 days total 6 tablet 0    lidocaine (XYLOCAINE) 2 % jelly APPLY TOPICALLY AS NEEDED 150 g 5    morphine (MS CONTIN) 60 MG extended release tablet Take 1 tablet by mouth 2 times daily for 30 days. . 60 tablet 0    ondansetron (ZOFRAN) 4 MG tablet TAKE 1 TABLET BY MOUTH EVERY DAY AS NEEDED FOR NAUSEA AND VOMITING 60 tablet 0    oxyCODONE (ROXICODONE) 5 MG immediate release tablet Take 1 tablet by mouth every 4 hours as needed for Pain for up to 30 days. . 180 tablet 0    albuterol (PROVENTIL) (2.5 MG/3ML) 0.083% nebulizer solution USE 1 VIAL VIA NEBULIZER EVERY 4 HOURS AS NEEDED FOR WHEEZING OR SHORTNESS OF BREATH 360 mL 3    naproxen (NAPROSYN) 500 MG tablet TAKE 1 TABLET BY MOUTH TWICE DAILY AS NEEDED FOR PAIN 180 tablet 0    butalbital-acetaminophen-caffeine (FIORICET, ESGIC) -40 MG per tablet TAKE 1 TABLET BY MOUTH EVERY 6 HOURS AS NEEDED FOR HEADACHE 60 tablet 0    SYMBICORT 160-4.5 MCG/ACT AERO INHALE 2 PUFFS INTO THE LUNGS TWICE DAILY 3 Inhaler 3    Handicap Placard MISC by Does not apply route Expires 06 June 2023 1 each 0    Compression Stockings MISC by Does not apply route Bilateral compression stockings. 20-30mmHg 2 each 0    furosemide (LASIX) 20 MG tablet Take 2 tabs PO daily x 5 days, then, TAKE 1 TABLET BY MOUTH EVERY DAY AS NEEDED FOR LEG SWELLING 90 tablet 3    VENTOLIN  (90 Base) MCG/ACT inhaler INHALE 2 PUFFS INTO THE LUNGS EVERY 6 HOURS AS NEEDED FOR WHEEZING 2 Inhaler 5    busPIRone (BUSPAR) 15 MG tablet TAKE 1 TABLET BY MOUTH EVERY 6 HOURS 360 tablet 3    omeprazole (PRILOSEC) 20 MG delayed release capsule TAKE 1 CAPSULE BY MOUTH DAILY 90 capsule 3    polyethylene glycol (GLYCOLAX) powder MIX 17 GRAMS IN 8 OUNCES OF LIQUID AND DRINK DAILY AS NEEDED FOR CONSTIPATION 527 g 0    fluticasone (FLONASE) 50 MCG/ACT nasal spray USE 1 SPRAY IN EACH NOSTRIL ONCE DAILY 3 Bottle 3    Nutritional Supplements (ENSURE) LIQD Low calorie Ensure (200 ronald). Drink 2 cans per day 60 Can 5    Respiratory Therapy Supplies (NEBULIZER COMPRESSOR) KIT COPD 1 kit 0    Respiratory Therapy Supplies (NEBULIZER/TUBING/MOUTHPIECE) KIT COPD 1 kit 0    Olopatadine HCl (PAZEO) 0.7 % SOLN Apply 1 drop to eye daily 3 Bottle 3    tiZANidine (ZANAFLEX) 4 MG tablet Take 1 tablet by mouth every 8 hours as needed (back pain) 90 tablet 1    guaiFENesin (MUCINEX) 600 MG extended release tablet Take 1 tablet by mouth 2 times daily as needed for Congestion 60 tablet 1    metoprolol tartrate (LOPRESSOR) 25 MG tablet TAKE 1 TABLET BY MOUTH TWICE DAILY 180 tablet 3    atorvastatin (LIPITOR) 10 MG tablet TAKE 1 TABLET BY MOUTH ONCE DAILY 90 tablet 3    calcium-cholecalciferol 500-200 MG-UNIT per tablet Take 1 tablet by mouth 2 times daily 180 tablet 3    aspirin 81 MG EC tablet Take 1 tablet by mouth daily 30 tablet 3    Misc. Devices MISC TENS UNIT ELECTRODE PADS    Use daily as needed for back pain. 4 each 3    Misc.  Devices (QUAD CANE) MISC Adjustable narrow base quad cane 1 each 0    triamcinolone (KENALOG) 0.1 % cream Apply topically 2 times daily for up to 4 weeks, then weekends only as needed. 60 g 0    Multiple Vitamin (MULTIVITAMINS PO) Take 1 tablet by mouth daily. No current facility-administered medications for this visit. Facility-Administered Medications Ordered in Other Visits   Medication Dose Route Frequency Provider Last Rate Last Dose    Absorbable Collagen Hemostat MISC 0.5 g  0.5 g Intra-Lesional Once Gerald Reardon MD         Orders Placed This Encounter   Medications    azithromycin (ZITHROMAX) 250 MG tablet     Sig: Take 2 tabs by mouth on day 1, then 1 tab by mouth daily x 5 days total     Dispense:  6 tablet     Refill:  0         All medications reviewed and reconciled, including OTC and herbal medications. Updated list given to patient.        Patient Active Problem List    Diagnosis Date Noted    Recurrent umbilical hernia 27/47/2721    Dyslipidemia     S/P shoulder surgery 12/07/2017    Biceps tendon rupture, left, sequela 11/02/2017    Tear of right rotator cuff 09/25/2017    Chronic venous insufficiency     Allergic conjunctivitis 06/20/2017    Microscopic hematuria 05/31/2017     Following with urology now      Rupture of distal biceps tendon 05/08/2017    Osteopenia     History of lymphoma     Chronic pain of right knee 01/20/2017    Bilateral edema of lower extremity 06/23/2016    Enlarged thoracic aorta ([address]      Upper limits of normal size on CT chest with contrast 2016      Ventral hernia without obstruction or gangrene     Pulmonary nodules 09/15/2015     Being monitored by oncology      Cervical radiculopathy 09/15/2015    CLIFF (generalized anxiety disorder) 08/07/2015    Failed back syndrome 06/08/2015    Allergic rhinitis 06/08/2015    Chronic pain syndrome 04/05/2015    Insomnia 02/20/2015    History of chemotherapy 02/09/2015    Diffuse large B cell lymphoma ([address] follows with Dr Leora Culp 12/12/2014    Chronic combined systolic and diastolic heart failure ([address] 12/12/2014     EF 45-50% FEB History:   Procedure Laterality Date    APPENDECTOMY  age 13     BACK SURGERY  03/31/2014    Lumbar Laminectomy L3-5 x3    BICEPS TENDON REPAIR Left 11/01/2017    Dr. David Smith TEST  07/06/2005    CARDIOVASCULAR STRESS TEST  06/20/2005    CARDIOVASCULAR STRESS TEST  05/08/2017    COLONOSCOPY  2013    Dr. Ding Solders     ERCP  10/23/2014    Moni Dubonnet Left     Moni Dubonnet Right     Dr Yamilet Kerns   [address] 2014    Billiary Stent    OTHER SURGICAL HISTORY  10/23/2014    [facility] guided paracentesis-[facility]    OTHER SURGICAL HISTORY  07/01/2013    [facility]    TRANSTHORACIC ECHOCARDIOGRAM      0/82/30,9/82/12,5/84/59,31/7/58    UMBILICAL HERNIA REPAIR  age 13    Moni Ramal VENTRAL HERNIA REPAIR  10/05/2015    w mesh-Dr Yamilet Kerns         Allergies   Allergen Reactions    Chantix [Varenicline]      Makes him feel terrible    Keflex [Cephalexin] Itching    Neurontin [Gabapentin] Other (See Comments)     HALLUCINATIONS    Tramadol Nausea Only and Nausea And Vomiting         Social History     Social History    Marital status:      Spouse name: Leelee Leon Number of children: 2    Years of education: 6     Occupational History     Progressive Stamping     Social History Main Topics    Smoking status: Current Every Day Smoker     Packs/day: 0.75     Years: 30.00     Types: Cigarettes    Smokeless tobacco: Never Used      Comment: smoking cessation information given at past appt    Alcohol use No    Drug use: No    Sexual activity: Yes     Partners: Female     Other Topics Concern    Not on file     Social History Narrative    No narrative on file         Family History   Problem Relation Age of Onset    Heart Disease Mother     Diabetes Mother     High Blood Pressure Mother     High Cholesterol Mother     High Blood Pressure Father     High Cholesterol Father     Cancer Paternal Grandfather         colon    Diabetes Maternal Grandmother          I have reviewed the patient's past medical history, past surgical history, allergies, medications, social and family history and I have made updates where appropriate. Review of Systems  Positive responses are highlighted in bold    Constitutional:  Fever, Chills, Night Sweats, Fatigue, Unexpected changes in weight  HENT:  Ear pain, Tinnitus, Nosebleeds, Trouble swallowing, Hearing loss  Cardiovascular:  Chest Pain, Palpitations, Orthopnea, Paroxysmal Nocturnal Dyspnea  Respiratory:  Cough, Wheezing, Shortness of breath, Chest tightness, Apnea  Gastrointestinal:  Nausea, Vomiting, Diarrhea, Constipation, Heartburn, Blood in stool  Genitourinary:  Difficulty or painful urination, Flank pain, Change in frequency, Urgency  Skin:  Color change, Rash, Itching, Wound  Musculoskeletal:  Joint pain, Back pain, Gait problems, Joint swelling, Myalgias  Neurological:  Dizziness, Headaches, Presyncope, Numbness, Seizures, Tremors  Endocrine:  Heat Intolerance, Cold Intolerance, Polydipsia, Polyphagia, Polyuria      PHYSICAL EXAM:  Vitals:    10/22/18 1321   BP: 120/70   Pulse: 88   Resp: 20   Temp: 98.4 °F (36.9 °C)   TempSrc: Oral   Weight: 187 lb (84.8 kg)   Height: 5' 4\" (1.626 m)     Body mass index is 32.1 kg/m². Pain Score:   4 (back/joints)    VS Reviewed  General Appearance: A&O x 3, No acute distress,well developed and well- nourished  Head: normocephalic and atraumatic  Eyes: pupils equal, round, and reactive to light, extraocular eye movements intact, conjunctivae and eye lids without erythema  ENT: external ear and ear canal clear bilaterally, TMs intact and regular, nose without deformity, nasal mucosa and turbinates normal without polyps, oropharynx normal, dentition is normal for age  Neck: supple and non-tender without mass, no thyromegaly or thyroid nodules, no cervical lymphadenopathy  Pulmonary/Chest: distant with good air movement bilaterally- faint wheezing and rhonchi. (ZITHROMAX) 250 MG tablet; Take 2 tabs by mouth on day 1, then 1 tab by mouth daily x 5 days total  Dispense: 6 tablet; Refill: 0    7. Microscopic hematuria    Neg w/u  F/u with urology as planned    8. Essential hypertension    At goal  Con't meds  Labs UTD    9. Dyslipidemia    con't statin  Labs appropriate. 10. Cigarette nicotine dependence without complication    In precontemplation stage and not ready to quit. Declines cessation, aware of risks of continued smoking as well as resources available to help quit. These include tobacco cessation classes as well as 1-800-QUIT NOW. No barriers other than lack of desire. 3+ min spent counseling. DISPOSITION    No Follow-up on file. Cathryn Bhat released without restrictions. Future Appointments  Date Time Provider Sara Almonte   1/22/2019 1:20 PM Bettina Cruz DO [address] [place] [address]   2/28/2019 9:00 AM Silver Brennan MD Oncology [facility] [address]   9/13/2019 10:15 AM Jason Humphrey Urology [facility] - [address] [place] [address] received counseling on the following healthy behaviors: nutrition, exercise, medication adherence and tobacco cessation    Patient given educational materials on: See Attached    I have instructed Cathryn Bhat to complete a self tracking handout on Smoking and instructed them to bring it with them to his next appointment. Barriers to learning and self management: none    Discussed use, benefit, and side effects of prescribed medications. Barriers to medication compliance addressed. All patient questions answered. Pt voiced understanding.        Electronically signed by Bettina Cruz DO on 10/22/2018 at 2:36 PM

## 2018-10-30 ENCOUNTER — HOSPITAL ENCOUNTER (OUTPATIENT)
Dept: PHYSICAL THERAPY | Age: 58
Setting detail: THERAPIES SERIES
Discharge: HOME | End: 2018-10-30
Payer: MEDICARE

## 2018-10-30 DIAGNOSIS — S46.212S BICEPS TENDON RUPTURE, LEFT, SEQUELA: ICD-10-CM

## 2018-10-30 DIAGNOSIS — M51.35 DDD (DEGENERATIVE DISC DISEASE), THORACOLUMBAR: Chronic | ICD-10-CM

## 2018-10-30 DIAGNOSIS — G89.4 CHRONIC PAIN SYNDROME: Chronic | ICD-10-CM

## 2018-10-30 DIAGNOSIS — M96.1 FAILED BACK SYNDROME: Chronic | ICD-10-CM

## 2018-10-30 DIAGNOSIS — Z87.39 HISTORY OF SPINAL STENOSIS: Chronic | ICD-10-CM

## 2018-10-30 DIAGNOSIS — M75.101 TEAR OF RIGHT ROTATOR CUFF, UNSPECIFIED TEAR EXTENT: ICD-10-CM

## 2018-10-30 DIAGNOSIS — Z98.890 S/P SHOULDER SURGERY: ICD-10-CM

## 2018-10-30 PROCEDURE — G8990 OTHER PT/OT CURRENT STATUS: HCPCS

## 2018-10-30 PROCEDURE — G8991 OTHER PT/OT GOAL STATUS: HCPCS

## 2018-10-30 PROCEDURE — 97161 PT EVAL LOW COMPLEX 20 MIN: CPT

## 2018-10-30 PROCEDURE — 97140 MANUAL THERAPY 1/> REGIONS: CPT

## 2018-10-30 RX ORDER — OXYCODONE HYDROCHLORIDE 5 MG/1
5 TABLET ORAL EVERY 4 HOURS PRN
Qty: 180 TABLET | Refills: 0 | Status: SHIPPED | OUTPATIENT
Start: 2018-10-30 | End: 2018-11-27 | Stop reason: SDUPTHER

## 2018-10-30 ASSESSMENT — PAIN DESCRIPTION - PAIN TYPE: TYPE: CHRONIC PAIN

## 2018-10-30 ASSESSMENT — PAIN SCALES - GENERAL: PAINLEVEL_OUTOF10: 3

## 2018-10-30 ASSESSMENT — PAIN DESCRIPTION - DESCRIPTORS: DESCRIPTORS: TINGLING;NUMBNESS

## 2018-10-30 NOTE — FLOWSHEET NOTE
GERD (gastroesophageal reflux disease)     History of lymphoma     Insomnia 2/20/2015    Microscopic hematuria 5/31/2017    Nicotine dependence 7/16/2014    Osteopenia     Pain management     managed by family Dr Chi Brown history of colonic polyps 10/24/2014    Pulmonary nodules 9/15/2015    Being monitored by oncology     Sleep apnea     no CPAP       Past Surgical History:   Procedure Laterality Date    APPENDECTOMY  age 13     BACK SURGERY  03/31/2014    Lumbar Laminectomy L3-5 x3    BICEPS TENDON REPAIR Left 11/01/2017    Dr. Wood Sox TEST  07/06/2005    CARDIOVASCULAR STRESS TEST  06/20/2005    CARDIOVASCULAR STRESS TEST  05/08/2017    COLONOSCOPY  2013    Dr. Tariq Mail ERCP  10/23/2014    INGUINAL HERNIA REPAIR Left    Robbin Si Right     Dr Vini Sofia OTHER SURGICAL HISTORY  OCT 2014    Billiary Stent    OTHER SURGICAL HISTORY  10/23/2014    [facility] guided paracentesis-[facility]    OTHER SURGICAL HISTORY  07/01/2013    [facility]    TRANSTHORACIC ECHOCARDIOGRAM      5/87/26,3/77/78,8/82/85,45/2/00    UMBILICAL HERNIA REPAIR  age 13    [id number]  10/05/2015    w mesh-Dr Lisa Gamboa       Current Outpatient Prescriptions   Medication Sig Dispense Refill    oxyCODONE (ROXICODONE) 5 MG immediate release tablet Take 1 tablet by mouth every 4 hours as needed for Pain for up to 30 days. . 180 tablet 0    lidocaine (XYLOCAINE) 2 % jelly APPLY TOPICALLY AS NEEDED 150 g 5    morphine (MS CONTIN) 60 MG extended release tablet Take 1 tablet by mouth 2 times daily for 30 days. . 60 tablet 0    ondansetron (ZOFRAN) 4 MG tablet TAKE 1 TABLET BY MOUTH EVERY DAY AS NEEDED FOR NAUSEA AND VOMITING 60 tablet 0    albuterol (PROVENTIL) (2.5 MG/3ML) 0.083% nebulizer solution USE 1 VIAL VIA NEBULIZER EVERY 4 HOURS AS NEEDED FOR WHEEZING OR SHORTNESS OF BREATH 360 mL 3    naproxen (NAPROSYN) 500 MG tablet TAKE 1 TABLET BY MOUTH TWICE DAILY AS Take 1 tablet by mouth 2 times daily 180 tablet 3    aspirin 81 MG EC tablet Take 1 tablet by mouth daily 30 tablet 3    Misc. Devices MISC TENS UNIT ELECTRODE PADS    Use daily as needed for back pain. 4 each 3    Misc. Devices (QUAD CANE) MISC Adjustable narrow base quad cane 1 each 0    triamcinolone (KENALOG) 0.1 % cream Apply topically 2 times daily for up to 4 weeks, then weekends only as needed. 60 g 0    Multiple Vitamin (MULTIVITAMINS PO) Take 1 tablet by mouth daily. No current facility-administered medications for this encounter. Facility-Administered Medications Ordered in Other Encounters   Medication Dose Route Frequency Provider Last Rate Last Dose    Absorbable Collagen Hemostat MISC 0.5 g  0.5 g Intra-Lesional Once Leelee Schwab MD              Allergies   Allergen Reactions    Chantix [Varenicline]      Makes him feel terrible    Keflex [Cephalexin] Itching    Neurontin [Gabapentin] Other (See Comments)     HALLUCINATIONS    Tramadol Nausea Only and Nausea And Vomiting       Pertinent Medical History: Active Cancer: No  CHF: No (COPD)  Acute DVT: No  Diabetes: No  Kidney Problems: No  Acute/active infection (i.e.. Cellulitis):No  Additional Pertinent Hx: The patient reported that he has had swelling in his legs for a few years. However, he was diagnosed with Non-Hodgkin's Lymphoma in 2014. Following stent placement and treatment he developed a significant increase in swelling in bilateral lower extremities and lower truncal region. He reported that he also developed open wounds and underwent treatments at the [address] [place] [address]. Patient reported that in March 2014 he underwent a back surgery (Spinal Stenosis with Cyst in Lumbar region), returned to work in July 2014, one week later he herniated his disc at T10-T11, he lost feelings in his legs and had difficulty with mobility. He was transferred to [facility]) and underwent another back surgery. wearing and replacement schedule, precautions and care of garment(s). X Patient will demonstrate increased functional mobility and strength with being able to lift his right lower extremity up onto the treatment bed surface without assistance which will lead to improved ambulation and balance skills. PLAN  Patient requires treatment by a licensed therapist to address functional deficits as outlined in the established plan of care. Plan to see patient to address the treatment plan as outlined above 2 times per week for 12 weeks decreasing frequency of treatment as appropriate. Time In: 0800   Time Out: 0920   Timed Code Minutes: 10   Untimed Code Minutes: 70   Total Treatment Time: 80     Evaluation Complexity: Based on the findings of patient history, examination, clinical presentation, and decision making during this evaluation, the evaluation of Valencia Tovar   is of low complexity. Francine COATES, DPM FOR THE REFERRAL OF THIS PATIENT. PT G-Codes  Functional Assessment Tool Used: LYMPHEDEMA LIFE IMPACT SCALE  Score: 50%  Functional Limitation: Other PT primary  Other PT Primary Current Status (): At least 40 percent but less than 60 percent impaired, limited or restricted  Other PT Primary Goal Status ():  At least 40 percent but less than 60 percent impaired, limited or restricted    Eri Hampton P.T. #[id number], EDDY, GENET        10/30/2018

## 2018-10-31 ENCOUNTER — CARE COORDINATION (OUTPATIENT)
Dept: CARE COORDINATION | Age: 58
End: 2018-10-31

## 2018-11-05 ENCOUNTER — HOSPITAL ENCOUNTER (OUTPATIENT)
Dept: PHYSICAL THERAPY | Age: 58
Setting detail: THERAPIES SERIES
Discharge: HOME | End: 2018-11-05
Payer: MEDICARE

## 2018-11-05 PROCEDURE — 97140 MANUAL THERAPY 1/> REGIONS: CPT

## 2018-11-05 ASSESSMENT — PAIN DESCRIPTION - DESCRIPTORS: DESCRIPTORS: ACHING;DULL;TINGLING;NUMBNESS

## 2018-11-05 ASSESSMENT — PAIN DESCRIPTION - PAIN TYPE: TYPE: CHRONIC PAIN

## 2018-11-05 ASSESSMENT — PAIN DESCRIPTION - ORIENTATION: ORIENTATION: LEFT;RIGHT

## 2018-11-05 ASSESSMENT — PAIN SCALES - GENERAL: PAINLEVEL_OUTOF10: 3

## 2018-11-05 ASSESSMENT — PAIN DESCRIPTION - LOCATION: LOCATION: LEG

## 2018-11-08 ENCOUNTER — HOSPITAL ENCOUNTER (OUTPATIENT)
Dept: PHYSICAL THERAPY | Age: 58
Setting detail: THERAPIES SERIES
Discharge: HOME | End: 2018-11-08
Payer: MEDICARE

## 2018-11-09 DIAGNOSIS — G89.4 CHRONIC PAIN SYNDROME: Chronic | ICD-10-CM

## 2018-11-09 DIAGNOSIS — M96.1 FAILED BACK SYNDROME: Chronic | ICD-10-CM

## 2018-11-09 DIAGNOSIS — Z98.890 S/P SHOULDER SURGERY: ICD-10-CM

## 2018-11-09 DIAGNOSIS — M75.101 TEAR OF RIGHT ROTATOR CUFF, UNSPECIFIED TEAR EXTENT: ICD-10-CM

## 2018-11-09 DIAGNOSIS — M51.35 DDD (DEGENERATIVE DISC DISEASE), THORACOLUMBAR: Chronic | ICD-10-CM

## 2018-11-09 DIAGNOSIS — S46.212S BICEPS TENDON RUPTURE, LEFT, SEQUELA: ICD-10-CM

## 2018-11-09 DIAGNOSIS — Z87.39 HISTORY OF SPINAL STENOSIS: Chronic | ICD-10-CM

## 2018-11-09 RX ORDER — MORPHINE SULFATE 60 MG/1
60 TABLET, FILM COATED, EXTENDED RELEASE ORAL 2 TIMES DAILY
Qty: 60 TABLET | Refills: 0 | Status: SHIPPED | OUTPATIENT
Start: 2018-11-09 | End: 2018-12-07 | Stop reason: SDUPTHER

## 2018-11-12 ENCOUNTER — HOSPITAL ENCOUNTER (OUTPATIENT)
Dept: PHYSICAL THERAPY | Age: 58
Setting detail: THERAPIES SERIES
Discharge: HOME | End: 2018-11-12
Payer: MEDICARE

## 2018-11-12 PROCEDURE — 97140 MANUAL THERAPY 1/> REGIONS: CPT

## 2018-11-12 ASSESSMENT — PAIN SCALES - GENERAL: PAINLEVEL_OUTOF10: 3

## 2018-11-12 ASSESSMENT — PAIN DESCRIPTION - ORIENTATION: ORIENTATION: RIGHT;LEFT

## 2018-11-12 ASSESSMENT — PAIN DESCRIPTION - PAIN TYPE: TYPE: CHRONIC PAIN

## 2018-11-12 ASSESSMENT — PAIN DESCRIPTION - LOCATION: LOCATION: LEG

## 2018-11-12 ASSESSMENT — PAIN DESCRIPTION - DESCRIPTORS: DESCRIPTORS: ACHING;DULL;NUMBNESS;TINGLING

## 2018-11-15 ENCOUNTER — HOSPITAL ENCOUNTER (OUTPATIENT)
Dept: PHYSICAL THERAPY | Age: 58
Setting detail: THERAPIES SERIES
Discharge: HOME | End: 2018-11-15
Payer: MEDICARE

## 2018-11-15 PROCEDURE — 97140 MANUAL THERAPY 1/> REGIONS: CPT

## 2018-11-15 ASSESSMENT — PAIN DESCRIPTION - ORIENTATION: ORIENTATION: LEFT;RIGHT;LOWER

## 2018-11-15 ASSESSMENT — PAIN SCALES - GENERAL: PAINLEVEL_OUTOF10: 8

## 2018-11-15 ASSESSMENT — PAIN DESCRIPTION - LOCATION: LOCATION: LEG;BACK

## 2018-11-15 ASSESSMENT — PAIN DESCRIPTION - PAIN TYPE: TYPE: CHRONIC PAIN

## 2018-11-20 ENCOUNTER — HOSPITAL ENCOUNTER (OUTPATIENT)
Dept: PHYSICAL THERAPY | Age: 58
Setting detail: THERAPIES SERIES
Discharge: HOME | End: 2018-11-20
Payer: MEDICARE

## 2018-11-20 PROCEDURE — 97140 MANUAL THERAPY 1/> REGIONS: CPT

## 2018-11-20 ASSESSMENT — PAIN DESCRIPTION - LOCATION: LOCATION: BACK;LEG

## 2018-11-20 ASSESSMENT — PAIN DESCRIPTION - ORIENTATION: ORIENTATION: LOWER;RIGHT

## 2018-11-20 ASSESSMENT — PAIN DESCRIPTION - DESCRIPTORS: DESCRIPTORS: ACHING;NUMBNESS;SHOOTING

## 2018-11-20 ASSESSMENT — PAIN DESCRIPTION - PAIN TYPE: TYPE: CHRONIC PAIN

## 2018-11-20 ASSESSMENT — PAIN SCALES - GENERAL: PAINLEVEL_OUTOF10: 6

## 2018-11-21 ENCOUNTER — HOSPITAL ENCOUNTER (OUTPATIENT)
Dept: PHYSICAL THERAPY | Age: 58
Setting detail: THERAPIES SERIES
End: 2018-11-21
Payer: MEDICARE

## 2018-11-26 ENCOUNTER — HOSPITAL ENCOUNTER (OUTPATIENT)
Dept: PHYSICAL THERAPY | Age: 58
Setting detail: THERAPIES SERIES
Discharge: HOME | End: 2018-11-26
Payer: MEDICARE

## 2018-11-26 PROCEDURE — 97140 MANUAL THERAPY 1/> REGIONS: CPT

## 2018-11-26 ASSESSMENT — PAIN SCALES - GENERAL: PAINLEVEL_OUTOF10: 4

## 2018-11-26 ASSESSMENT — PAIN DESCRIPTION - PAIN TYPE: TYPE: CHRONIC PAIN

## 2018-11-26 ASSESSMENT — PAIN DESCRIPTION - DESCRIPTORS: DESCRIPTORS: ACHING;SHOOTING

## 2018-11-26 ASSESSMENT — PAIN DESCRIPTION - ORIENTATION: ORIENTATION: LOWER

## 2018-11-26 ASSESSMENT — PAIN DESCRIPTION - LOCATION: LOCATION: BACK;LEG

## 2018-11-26 NOTE — PROGRESS NOTES
anterior aspect) LOCATION # 2  (Dorsum of right foot)   COLOR OF TAPE Beige Beige   AMOUNT OF STRETCH Less than 10% Less than 10%   ANCHOR Proximally Proximally   FINGERS EXTENDING. . Distally Distally   OTHER 2.5 blocks 2.5 blocks     Compression Bandaging  Location: Left lower extremity (Great toe to knee joint line). Compression Gradient: Light moderate to minimal.  Supplies (per involved extremity):   Stockinette: none   Transelast none   10 cm Artiflex Cotton none   15 cm Artiflex Cotton none   4 cm Rosidal K none   6 cm Rosidal K none   8 cm Rosidal K none   10 cm Rosidal K none   12 cm Rosidal K none   Rosidal Soft none   -TUBIGRIP STOCKINETTE: Donned Size E compression stockinettes from metatarsal heads to knee joint line over Kinesiotape. Decongestive/Therapeutic Exercise  The patient was able to complete Decongestive Therapy exercises (x 10 reps) including ankle pumps. The exercises were completed with compression bandages on, without complaints of pain from the patient. The Decongestive Therapy exercises assist with decreasing the swelling by increasing the muscle pumping action of the lymph collectors and by increasing the fluid uptake in the lymphatic system. Patient Education  Education Provided:   -11/26/2018: Discussed plan of care with goal of obtaining a compression pump for home, patient would be a great candidate for use of pump at home.   -11/20/2018: Reviewed plan of care. -11/15/2018: Reviewed plan of care and reviewed process and purpose of compression pump. -11/12/2018: Reviewed plan of care/purpose of Kinesiotape and precautions. -11/05/2018: Reviewed goals/plan of care/compression bandaging precautions.   Learner:patient  Method: demonstration, explanation and handout       Outcome: acknowledged understanding of goals/plan of care/bandaging precautions, demonstrated understanding and asked questions     GOALS   SHORT-TERM GOALS:  to be met in 6 weeks   X  Patient will demonstrate a

## 2018-11-27 DIAGNOSIS — Z87.39 HISTORY OF SPINAL STENOSIS: Chronic | ICD-10-CM

## 2018-11-27 DIAGNOSIS — M75.101 TEAR OF RIGHT ROTATOR CUFF, UNSPECIFIED TEAR EXTENT: ICD-10-CM

## 2018-11-27 DIAGNOSIS — Z98.890 S/P SHOULDER SURGERY: ICD-10-CM

## 2018-11-27 DIAGNOSIS — G89.4 CHRONIC PAIN SYNDROME: Chronic | ICD-10-CM

## 2018-11-27 DIAGNOSIS — S46.212S BICEPS TENDON RUPTURE, LEFT, SEQUELA: ICD-10-CM

## 2018-11-27 DIAGNOSIS — R11.2 NAUSEA AND VOMITING, INTRACTABILITY OF VOMITING NOT SPECIFIED, UNSPECIFIED VOMITING TYPE: ICD-10-CM

## 2018-11-27 DIAGNOSIS — M96.1 FAILED BACK SYNDROME: Chronic | ICD-10-CM

## 2018-11-27 DIAGNOSIS — M51.35 DDD (DEGENERATIVE DISC DISEASE), THORACOLUMBAR: Chronic | ICD-10-CM

## 2018-11-27 RX ORDER — OXYCODONE HYDROCHLORIDE 5 MG/1
5 TABLET ORAL EVERY 4 HOURS PRN
Qty: 180 TABLET | Refills: 0 | Status: SHIPPED | OUTPATIENT
Start: 2018-11-27 | End: 2018-12-24 | Stop reason: SDUPTHER

## 2018-11-27 RX ORDER — ONDANSETRON 4 MG/1
TABLET, FILM COATED ORAL
Qty: 60 TABLET | Refills: 0 | Status: SHIPPED | OUTPATIENT
Start: 2018-11-27 | End: 2019-01-02 | Stop reason: SDUPTHER

## 2018-11-27 NOTE — TELEPHONE ENCOUNTER
tolerance/dependence & alternative treatments discussed., No signs of potential drug abuse or diversion identified. Josephine Pelaez DO)  Chronic Pain: Functional status reviewed - continues with improved or maintaining ADL's. Josephine Pelaez DO)  Medication Contracts: Existing medication contract.  Josephine Pelaez DO)      Future Appointments  Date Time Provider Sara Suzy   11/30/2018 1:00 PM Edmundo Rollins, PT [facility] PT None   1/22/2019 1:20 PM Josephine Pelaez DO [facility] [place]   2/28/2019 9:00 AM Iman Nelson MD Oncology [facility] - [place]   9/13/2019 10:15 AM Jason Sarkar Urology [facility] [place]

## 2018-11-30 ENCOUNTER — HOSPITAL ENCOUNTER (OUTPATIENT)
Dept: PHYSICAL THERAPY | Age: 58
Setting detail: THERAPIES SERIES
Discharge: HOME | End: 2018-11-30
Payer: MEDICARE

## 2018-11-30 PROCEDURE — 97140 MANUAL THERAPY 1/> REGIONS: CPT

## 2018-11-30 ASSESSMENT — PAIN DESCRIPTION - LOCATION: LOCATION: LEG

## 2018-11-30 ASSESSMENT — PAIN DESCRIPTION - DESCRIPTORS: DESCRIPTORS: ACHING;HEAVINESS;TIGHTNESS

## 2018-11-30 ASSESSMENT — PAIN SCALES - GENERAL: PAINLEVEL_OUTOF10: 3

## 2018-11-30 ASSESSMENT — PAIN DESCRIPTION - ORIENTATION: ORIENTATION: LOWER

## 2018-11-30 ASSESSMENT — PAIN DESCRIPTION - PAIN TYPE: TYPE: CHRONIC PAIN

## 2018-12-03 NOTE — PROGRESS NOTES
demonstration, explanation and handout       Outcome: acknowledged understanding of goals/plan of care/bandaging precautions, demonstrated understanding and asked questions     GOALS   SHORT-TERM GOALS:  to be met in 6 weeks   X  Patient will demonstrate a decrease in circumferential measurements of the affected extremity by 10 cm working towards the lymphedema swelling stabilizing and patient being able to get measured and fitted for a new compression garment to be worn daily to keep swelling down. X  Patient will tolerate wearing the compression bandages from one treatment session until the next treatment session working towards meeting short-term goal #1. X Patient/family/caregiver will demonstrate and verbalize 3-5 skin care precautionary measures to decrease dry skin and risk of infection. X Patient/family/caregiver will demonstrate applying compression bandages with no greater than moderate assist and verbal cues from the therapist working towards being modified independent with applying the compression bandages for night time swelling. ASSESSMENT:  Assessment:  Patient progressing toward goal achievement. As noted from circumferential measurements above, the patient has demonstrated an increase in total circumferential measurements. Activity Tolerance:  Patient tolerance of  treatment: Good. Plan: Week of December 3 (1x).      Time In: 1300   Time Out: 1355   Timed Code Minutes: 55   Untimed Code Minutes: 0   Total Treatment Time: [address] [facility], [facility]4882, [place]   11/30/2018

## 2018-12-06 ENCOUNTER — HOSPITAL ENCOUNTER (OUTPATIENT)
Dept: PHYSICAL THERAPY | Age: 58
Setting detail: THERAPIES SERIES
Discharge: HOME | End: 2018-12-06
Payer: MEDICARE

## 2018-12-06 PROCEDURE — 97140 MANUAL THERAPY 1/> REGIONS: CPT

## 2018-12-06 ASSESSMENT — PAIN DESCRIPTION - PAIN TYPE: TYPE: CHRONIC PAIN

## 2018-12-06 ASSESSMENT — PAIN DESCRIPTION - LOCATION: LOCATION: LEG;BACK

## 2018-12-06 ASSESSMENT — PAIN DESCRIPTION - ORIENTATION: ORIENTATION: RIGHT;LEFT

## 2018-12-06 ASSESSMENT — PAIN SCALES - GENERAL: PAINLEVEL_OUTOF10: 5

## 2018-12-06 NOTE — PROGRESS NOTES
lower extremity (Great toe to knee joint line). Compression Gradient: Light moderate to minimal.  Supplies (per involved extremity):   Stockinette: none   Transelast none   10 cm Artiflex Cotton none   15 cm Artiflex Cotton none   4 cm Rosidal K none   6 cm Rosidal K none   8 cm Rosidal K none   10 cm Rosidal K none   12 cm Rosidal K none   Rosidal Soft none     Decongestive/Therapeutic Exercise  The patient was able to complete Decongestive Therapy exercises (x 10 reps) including ankle pumps. The exercises were completed with compression bandages on, without complaints of pain from the patient. The Decongestive Therapy exercises assist with decreasing the swelling by increasing the muscle pumping action of the lymph collectors and by increasing the fluid uptake in the lymphatic system. Patient Education  Education Provided:   -12/06/2018: Reviewed plan of care. -11/30/2018: Reviewed plan of care and goals/recommendations. -11/26/2018: Discussed plan of care with goal of obtaining a compression pump for home, patient would be a great candidate for use of pump at home.   -11/20/2018: Reviewed plan of care. -11/15/2018: Reviewed plan of care and reviewed process and purpose of compression pump. -11/12/2018: Reviewed plan of care/purpose of Kinesiotape and precautions. -11/05/2018: Reviewed goals/plan of care/compression bandaging precautions. Learner:patient  Method: demonstration, explanation and handout       Outcome: acknowledged understanding of goals/plan of care/bandaging precautions, demonstrated understanding and asked questions     GOALS   SHORT-TERM GOALS:  to be met in 6 weeks   X  Patient will demonstrate a decrease in circumferential measurements of the affected extremity by 10 cm working towards the lymphedema swelling stabilizing and patient being able to get measured and fitted for a new compression garment to be worn daily to keep swelling down.    X  Patient will tolerate wearing the

## 2018-12-07 ENCOUNTER — CARE COORDINATION (OUTPATIENT)
Dept: CASE MANAGEMENT | Age: 58
End: 2018-12-07

## 2018-12-07 DIAGNOSIS — M19.90 ARTHRITIS: Chronic | ICD-10-CM

## 2018-12-07 DIAGNOSIS — G89.4 CHRONIC PAIN SYNDROME: Chronic | ICD-10-CM

## 2018-12-07 DIAGNOSIS — M75.101 TEAR OF RIGHT ROTATOR CUFF, UNSPECIFIED TEAR EXTENT: ICD-10-CM

## 2018-12-07 DIAGNOSIS — S46.212S BICEPS TENDON RUPTURE, LEFT, SEQUELA: ICD-10-CM

## 2018-12-07 DIAGNOSIS — M96.1 FAILED BACK SYNDROME: Chronic | ICD-10-CM

## 2018-12-07 DIAGNOSIS — M51.35 DDD (DEGENERATIVE DISC DISEASE), THORACOLUMBAR: Chronic | ICD-10-CM

## 2018-12-07 DIAGNOSIS — Z87.39 HISTORY OF SPINAL STENOSIS: Chronic | ICD-10-CM

## 2018-12-07 DIAGNOSIS — Z98.890 S/P SHOULDER SURGERY: ICD-10-CM

## 2018-12-07 RX ORDER — MORPHINE SULFATE 60 MG/1
60 TABLET, FILM COATED, EXTENDED RELEASE ORAL 2 TIMES DAILY
Qty: 60 TABLET | Refills: 0 | Status: SHIPPED | OUTPATIENT
Start: 2018-12-07 | End: 2019-01-04 | Stop reason: SDUPTHER

## 2018-12-07 RX ORDER — NAPROXEN 500 MG/1
TABLET ORAL
Qty: 60 TABLET | Refills: 2 | Status: SHIPPED | OUTPATIENT
Start: 2018-12-07 | End: 2019-02-01 | Stop reason: SDUPTHER

## 2018-12-07 NOTE — TELEPHONE ENCOUNTER
[facility] PT None   1/22/2019 1:20 PM Darya Chen DO [facility] [address]   2/28/2019 9:00 AM Vasquez Muñoz MD Oncology [facility]   9/13/2019 10:15 AM aJson Lee [facility] - [facility]

## 2018-12-11 ENCOUNTER — HOSPITAL ENCOUNTER (OUTPATIENT)
Dept: PHYSICAL THERAPY | Age: 58
Setting detail: THERAPIES SERIES
Discharge: HOME | End: 2018-12-11
Payer: MEDICARE

## 2018-12-11 PROCEDURE — 97140 MANUAL THERAPY 1/> REGIONS: CPT

## 2018-12-11 ASSESSMENT — PAIN DESCRIPTION - ORIENTATION: ORIENTATION: LEFT;RIGHT;LOWER

## 2018-12-11 ASSESSMENT — PAIN DESCRIPTION - PAIN TYPE: TYPE: CHRONIC PAIN

## 2018-12-11 ASSESSMENT — PAIN SCALES - GENERAL: PAINLEVEL_OUTOF10: 4

## 2018-12-11 ASSESSMENT — PAIN DESCRIPTION - LOCATION: LOCATION: LEG;BACK

## 2018-12-11 ASSESSMENT — PAIN DESCRIPTION - DESCRIPTORS: DESCRIPTORS: ACHING;HEAVINESS;TIGHTNESS

## 2018-12-11 NOTE — PROGRESS NOTES
GOALS:  to be met in 6 weeks   X  Patient will demonstrate a decrease in circumferential measurements of the affected extremity by 10 cm working towards the lymphedema swelling stabilizing and patient being able to get measured and fitted for a new compression garment to be worn daily to keep swelling down. X  Patient will tolerate wearing the compression bandages from one treatment session until the next treatment session working towards meeting short-term goal #1. X Patient/family/caregiver will demonstrate and verbalize 3-5 skin care precautionary measures to decrease dry skin and risk of infection. X Patient/family/caregiver will demonstrate applying compression bandages with no greater than moderate assist and verbal cues from the therapist working towards being modified independent with applying the compression bandages for night time swelling. ASSESSMENT:  Assessment:  Patient progressing toward goal achievement. Activity Tolerance:  Patient tolerance of  treatment: Good. Plan: Week of December 17 (1x).      Time In: 1430   Time Out: 1535   Timed Code Minutes: 65   Untimed Code Minutes: 0   Total Treatment Time: [facility] [facility], [address]0772, [place]   12/11/2018

## 2018-12-19 ENCOUNTER — HOSPITAL ENCOUNTER (OUTPATIENT)
Dept: PHYSICAL THERAPY | Age: 58
Setting detail: THERAPIES SERIES
Discharge: HOME | End: 2018-12-19
Payer: MEDICARE

## 2018-12-19 PROCEDURE — 97140 MANUAL THERAPY 1/> REGIONS: CPT

## 2018-12-19 ASSESSMENT — PAIN DESCRIPTION - DESCRIPTORS: DESCRIPTORS: ACHING;SORE;SHOOTING

## 2018-12-19 ASSESSMENT — PAIN DESCRIPTION - ORIENTATION: ORIENTATION: RIGHT;LEFT;LOWER

## 2018-12-19 ASSESSMENT — PAIN DESCRIPTION - PAIN TYPE: TYPE: CHRONIC PAIN

## 2018-12-19 ASSESSMENT — PAIN SCALES - GENERAL: PAINLEVEL_OUTOF10: 3

## 2018-12-19 ASSESSMENT — PAIN DESCRIPTION - LOCATION: LOCATION: LEG;BACK

## 2018-12-20 PROCEDURE — G8991 OTHER PT/OT GOAL STATUS: HCPCS

## 2018-12-20 PROCEDURE — G8990 OTHER PT/OT CURRENT STATUS: HCPCS

## 2018-12-20 NOTE — PROGRESS NOTES
lymph collectors and by increasing the fluid uptake in the lymphatic system. Patient Education  Education Provided:   -12/19/2018: Reviewed plan of care and goals. -12/11/2018: Recommend compression pump for home use. -12/06/2018: Reviewed plan of care. -11/30/2018: Reviewed plan of care and goals/recommendations. -11/26/2018: Discussed plan of care with goal of obtaining a compression pump for home, patient would be a great candidate for use of pump at home.   -11/20/2018: Reviewed plan of care. -11/15/2018: Reviewed plan of care and reviewed process and purpose of compression pump. -11/12/2018: Reviewed plan of care/purpose of Kinesiotape and precautions. -11/05/2018: Reviewed goals/plan of care/compression bandaging precautions. Learner:patient  Method: demonstration, explanation and handout       Outcome: acknowledged understanding of goals/plan of care/bandaging precautions, demonstrated understanding and asked questions     GOALS   SHORT-TERM GOALS:  to be met in 6 weeks   X  NOT MET  (ONGOING)  Patient will demonstrate a decrease in circumferential measurements of the affected extremity by 10 cm working towards the lymphedema swelling stabilizing and patient being able to get measured and fitted for a new compression garment to be worn daily to keep swelling down. X  NOT MET  Patient will tolerate wearing the compression bandages from one treatment session until the next treatment session working towards meeting short-term goal #1. X  MET Patient/family/caregiver will demonstrate and verbalize 3-5 skin care precautionary measures to decrease dry skin and risk of infection. X  NOT MET Patient/family/caregiver will demonstrate applying compression bandages with no greater than moderate assist and verbal cues from the therapist working towards being modified independent with applying the compression bandages for night time swelling. ALL LONG TERM GOALS ARE ONGOING.    ASSESSMENT:  Assessment:

## 2018-12-24 DIAGNOSIS — M96.1 FAILED BACK SYNDROME: Chronic | ICD-10-CM

## 2018-12-24 DIAGNOSIS — S46.212S BICEPS TENDON RUPTURE, LEFT, SEQUELA: ICD-10-CM

## 2018-12-24 DIAGNOSIS — M75.101 TEAR OF RIGHT ROTATOR CUFF, UNSPECIFIED TEAR EXTENT: ICD-10-CM

## 2018-12-24 DIAGNOSIS — G89.4 CHRONIC PAIN SYNDROME: Chronic | ICD-10-CM

## 2018-12-24 DIAGNOSIS — M51.35 DDD (DEGENERATIVE DISC DISEASE), THORACOLUMBAR: Chronic | ICD-10-CM

## 2018-12-24 DIAGNOSIS — Z87.39 HISTORY OF SPINAL STENOSIS: Chronic | ICD-10-CM

## 2018-12-24 DIAGNOSIS — Z98.890 S/P SHOULDER SURGERY: ICD-10-CM

## 2018-12-24 RX ORDER — OXYCODONE HYDROCHLORIDE 5 MG/1
5 TABLET ORAL EVERY 4 HOURS PRN
Qty: 180 TABLET | Refills: 0 | Status: SHIPPED | OUTPATIENT
Start: 2018-12-24 | End: 2019-01-21 | Stop reason: SDUPTHER

## 2018-12-28 DIAGNOSIS — G44.219 EPISODIC TENSION-TYPE HEADACHE, NOT INTRACTABLE: ICD-10-CM

## 2018-12-28 RX ORDER — BUTALBITAL, ACETAMINOPHEN AND CAFFEINE 50; 325; 40 MG/1; MG/1; MG/1
TABLET ORAL
Qty: 60 TABLET | Refills: 0 | Status: SHIPPED | OUTPATIENT
Start: 2018-12-28 | End: 2019-01-01 | Stop reason: SDUPTHER

## 2019-01-01 ENCOUNTER — NURSE ONLY (OUTPATIENT)
Dept: LAB | Age: 59
End: 2019-01-01

## 2019-01-01 ENCOUNTER — TELEPHONE (OUTPATIENT)
Dept: FAMILY MEDICINE CLINIC | Age: 59
End: 2019-01-01

## 2019-01-01 ENCOUNTER — APPOINTMENT (OUTPATIENT)
Dept: OCCUPATIONAL THERAPY | Age: 59
End: 2019-01-01
Payer: MEDICARE

## 2019-01-01 ENCOUNTER — OFFICE VISIT (OUTPATIENT)
Dept: FAMILY MEDICINE CLINIC | Age: 59
End: 2019-01-01
Payer: MEDICARE

## 2019-01-01 ENCOUNTER — HOSPITAL ENCOUNTER (OUTPATIENT)
Dept: CT IMAGING | Age: 59
Discharge: HOME | End: 2019-05-09
Payer: MEDICARE

## 2019-01-01 ENCOUNTER — HOSPITAL ENCOUNTER (OUTPATIENT)
Dept: OCCUPATIONAL THERAPY | Age: 59
Setting detail: THERAPIES SERIES
Discharge: HOME | End: 2019-08-14
Payer: MEDICARE

## 2019-01-01 ENCOUNTER — HOSPITAL ENCOUNTER (OUTPATIENT)
Dept: NON INVASIVE DIAGNOSTICS | Age: 59
Discharge: HOME | End: 2019-11-05
Payer: MEDICARE

## 2019-01-01 ENCOUNTER — HOSPITAL ENCOUNTER (OUTPATIENT)
Dept: INFUSION THERAPY | Age: 59
Discharge: HOME | End: 2019-08-29
Payer: MEDICARE

## 2019-01-01 ENCOUNTER — HOSPITAL ENCOUNTER (OUTPATIENT)
Dept: OCCUPATIONAL THERAPY | Age: 59
Setting detail: THERAPIES SERIES
End: 2019-01-01
Payer: MEDICARE

## 2019-01-01 ENCOUNTER — OFFICE VISIT (OUTPATIENT)
Dept: ONCOLOGY | Age: 59
End: 2019-01-01
Payer: MEDICARE

## 2019-01-01 ENCOUNTER — HOSPITAL ENCOUNTER (OUTPATIENT)
Dept: GENERAL RADIOLOGY | Age: 59
Discharge: HOME | End: 2019-04-15
Payer: MEDICARE

## 2019-01-01 ENCOUNTER — HOSPITAL ENCOUNTER (OUTPATIENT)
Age: 59
Discharge: HOME | End: 2019-04-15
Payer: MEDICARE

## 2019-01-01 ENCOUNTER — HOSPITAL ENCOUNTER (OUTPATIENT)
Dept: INFUSION THERAPY | Age: 59
Discharge: HOME | End: 2019-02-28
Payer: MEDICARE

## 2019-01-01 ENCOUNTER — TELEPHONE (OUTPATIENT)
Dept: ONCOLOGY | Age: 59
End: 2019-01-01

## 2019-01-01 ENCOUNTER — HOSPITAL ENCOUNTER (OUTPATIENT)
Dept: WOMENS IMAGING | Age: 59
Discharge: HOME | End: 2019-05-06
Payer: MEDICARE

## 2019-01-01 VITALS
WEIGHT: 162 LBS | HEART RATE: 103 BPM | SYSTOLIC BLOOD PRESSURE: 115 MMHG | BODY MASS INDEX: 27.66 KG/M2 | DIASTOLIC BLOOD PRESSURE: 65 MMHG | RESPIRATION RATE: 18 BRPM | HEIGHT: 64 IN | TEMPERATURE: 98.3 F

## 2019-01-01 VITALS
HEIGHT: 64 IN | SYSTOLIC BLOOD PRESSURE: 120 MMHG | HEART RATE: 68 BPM | RESPIRATION RATE: 16 BRPM | DIASTOLIC BLOOD PRESSURE: 70 MMHG | TEMPERATURE: 98.3 F | WEIGHT: 160 LBS | BODY MASS INDEX: 27.31 KG/M2

## 2019-01-01 VITALS
HEIGHT: 64 IN | RESPIRATION RATE: 18 BRPM | WEIGHT: 185 LBS | DIASTOLIC BLOOD PRESSURE: 78 MMHG | BODY MASS INDEX: 31.58 KG/M2 | HEART RATE: 93 BPM | SYSTOLIC BLOOD PRESSURE: 139 MMHG | TEMPERATURE: 98.3 F

## 2019-01-01 VITALS
HEART RATE: 92 BPM | OXYGEN SATURATION: 95 % | RESPIRATION RATE: 20 BRPM | SYSTOLIC BLOOD PRESSURE: 140 MMHG | WEIGHT: 180.4 LBS | HEIGHT: 64 IN | BODY MASS INDEX: 30.8 KG/M2 | DIASTOLIC BLOOD PRESSURE: 78 MMHG | TEMPERATURE: 98.1 F

## 2019-01-01 VITALS
RESPIRATION RATE: 18 BRPM | OXYGEN SATURATION: 95 % | BODY MASS INDEX: 26.91 KG/M2 | DIASTOLIC BLOOD PRESSURE: 78 MMHG | TEMPERATURE: 98.5 F | WEIGHT: 157.6 LBS | HEIGHT: 64 IN | SYSTOLIC BLOOD PRESSURE: 133 MMHG | HEART RATE: 80 BPM

## 2019-01-01 VITALS
HEIGHT: 64 IN | WEIGHT: 155 LBS | SYSTOLIC BLOOD PRESSURE: 134 MMHG | HEART RATE: 96 BPM | BODY MASS INDEX: 26.46 KG/M2 | DIASTOLIC BLOOD PRESSURE: 74 MMHG | TEMPERATURE: 98 F | RESPIRATION RATE: 16 BRPM

## 2019-01-01 VITALS
HEIGHT: 64 IN | HEART RATE: 84 BPM | SYSTOLIC BLOOD PRESSURE: 136 MMHG | DIASTOLIC BLOOD PRESSURE: 82 MMHG | RESPIRATION RATE: 16 BRPM | TEMPERATURE: 98.6 F | WEIGHT: 186 LBS | BODY MASS INDEX: 31.76 KG/M2

## 2019-01-01 VITALS
SYSTOLIC BLOOD PRESSURE: 128 MMHG | WEIGHT: 152 LBS | TEMPERATURE: 98.2 F | HEIGHT: 63 IN | RESPIRATION RATE: 16 BRPM | BODY MASS INDEX: 26.93 KG/M2 | DIASTOLIC BLOOD PRESSURE: 86 MMHG | HEART RATE: 80 BPM

## 2019-01-01 DIAGNOSIS — M51.35 DDD (DEGENERATIVE DISC DISEASE), THORACOLUMBAR: Chronic | ICD-10-CM

## 2019-01-01 DIAGNOSIS — M51.35 DDD (DEGENERATIVE DISC DISEASE), THORACOLUMBAR: ICD-10-CM

## 2019-01-01 DIAGNOSIS — D64.9 CHRONIC ANEMIA: ICD-10-CM

## 2019-01-01 DIAGNOSIS — G89.4 CHRONIC PAIN SYNDROME: Chronic | ICD-10-CM

## 2019-01-01 DIAGNOSIS — Z79.899 CONTROLLED SUBSTANCE AGREEMENT SIGNED: ICD-10-CM

## 2019-01-01 DIAGNOSIS — S46.212S BICEPS TENDON RUPTURE, LEFT, SEQUELA: ICD-10-CM

## 2019-01-01 DIAGNOSIS — B18.2 CHRONIC HEPATITIS C WITHOUT HEPATIC COMA (HCC): Chronic | ICD-10-CM

## 2019-01-01 DIAGNOSIS — C83.33 DIFFUSE LARGE B-CELL LYMPHOMA OF INTRA-ABDOMINAL LYMPH NODES (HCC): ICD-10-CM

## 2019-01-01 DIAGNOSIS — I50.42 CHRONIC COMBINED SYSTOLIC AND DIASTOLIC HEART FAILURE (HCC): ICD-10-CM

## 2019-01-01 DIAGNOSIS — Z98.890 S/P SHOULDER SURGERY: ICD-10-CM

## 2019-01-01 DIAGNOSIS — M96.1 FAILED BACK SYNDROME: Chronic | ICD-10-CM

## 2019-01-01 DIAGNOSIS — M75.101 TEAR OF RIGHT ROTATOR CUFF: ICD-10-CM

## 2019-01-01 DIAGNOSIS — M85.89 OSTEOPENIA OF MULTIPLE SITES: Chronic | ICD-10-CM

## 2019-01-01 DIAGNOSIS — H10.13 ALLERGIC CONJUNCTIVITIS, BILATERAL: ICD-10-CM

## 2019-01-01 DIAGNOSIS — B37.0 THRUSH, ORAL: Primary | ICD-10-CM

## 2019-01-01 DIAGNOSIS — M85.89 OSTEOPENIA OF MULTIPLE SITES: ICD-10-CM

## 2019-01-01 DIAGNOSIS — E78.5 DYSLIPIDEMIA: ICD-10-CM

## 2019-01-01 DIAGNOSIS — M96.1 FAILED BACK SYNDROME: ICD-10-CM
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
## 2019-01-01 DIAGNOSIS — Z87.39 HISTORY OF SPINAL STENOSIS: Chronic | ICD-10-CM

## 2019-01-01 DIAGNOSIS — E66.3 OVERWEIGHT: ICD-10-CM

## 2019-01-01 DIAGNOSIS — K76.9 LIVER DISEASE: ICD-10-CM

## 2019-01-01 DIAGNOSIS — J30.89 CHRONIC NONSEASONAL ALLERGIC RHINITIS DUE TO POLLEN: ICD-10-CM

## 2019-01-01 DIAGNOSIS — F17.210 CIGARETTE NICOTINE DEPENDENCE WITHOUT COMPLICATION: ICD-10-CM
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
## 2019-01-01 DIAGNOSIS — J01.90 ACUTE RHINOSINUSITIS: ICD-10-CM

## 2019-01-01 DIAGNOSIS — F41.1 GAD (GENERALIZED ANXIETY DISORDER): Chronic | ICD-10-CM

## 2019-01-01 DIAGNOSIS — J43.9 PULMONARY EMPHYSEMA, UNSPECIFIED EMPHYSEMA TYPE (HCC): Chronic | ICD-10-CM

## 2019-01-01 DIAGNOSIS — G44.219 EPISODIC TENSION-TYPE HEADACHE, NOT INTRACTABLE: ICD-10-CM

## 2019-01-01 DIAGNOSIS — M75.101 TEAR OF RIGHT ROTATOR CUFF, UNSPECIFIED TEAR EXTENT: ICD-10-CM

## 2019-01-01 DIAGNOSIS — J40 BRONCHITIS: Primary | ICD-10-CM

## 2019-01-01 DIAGNOSIS — Z79.899 CONTROLLED SUBSTANCE AGREEMENT SIGNED: Chronic | ICD-10-CM
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
## 2019-01-01 DIAGNOSIS — G89.29 CHRONIC RIGHT SHOULDER PAIN: Primary | ICD-10-CM

## 2019-01-01 DIAGNOSIS — I50.42 CHRONIC COMBINED SYSTOLIC AND DIASTOLIC HEART FAILURE (HCC): Chronic | ICD-10-CM

## 2019-01-01 DIAGNOSIS — Z12.5 SPECIAL SCREENING FOR MALIGNANT NEOPLASM OF PROSTATE: ICD-10-CM

## 2019-01-01 DIAGNOSIS — J43.9 PULMONARY EMPHYSEMA, UNSPECIFIED EMPHYSEMA TYPE (HCC): ICD-10-CM

## 2019-01-01 DIAGNOSIS — I71.21 ASCENDING AORTIC ANEURYSM: Chronic | ICD-10-CM

## 2019-01-01 DIAGNOSIS — M25.511 ACUTE PAIN OF RIGHT SHOULDER: Primary | ICD-10-CM

## 2019-01-01 DIAGNOSIS — G89.4 CHRONIC PAIN SYNDROME: ICD-10-CM

## 2019-01-01 DIAGNOSIS — M25.511 CHRONIC RIGHT SHOULDER PAIN: Primary | ICD-10-CM

## 2019-01-01 DIAGNOSIS — R11.2 NAUSEA AND VOMITING, INTRACTABILITY OF VOMITING NOT SPECIFIED, UNSPECIFIED VOMITING TYPE: ICD-10-CM

## 2019-01-01 DIAGNOSIS — K21.9 GASTROESOPHAGEAL REFLUX DISEASE, ESOPHAGITIS PRESENCE NOT SPECIFIED: Chronic | ICD-10-CM

## 2019-01-01 DIAGNOSIS — M75.101 NONTRAUMATIC TEAR OF RIGHT ROTATOR CUFF, UNSPECIFIED TEAR EXTENT: ICD-10-CM

## 2019-01-01 DIAGNOSIS — Z87.39 HISTORY OF SPINAL STENOSIS: ICD-10-CM

## 2019-01-01 DIAGNOSIS — F17.210 CIGARETTE NICOTINE DEPENDENCE WITHOUT COMPLICATION: Chronic | ICD-10-CM
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
## 2019-01-01 DIAGNOSIS — F41.1 GAD (GENERALIZED ANXIETY DISORDER): ICD-10-CM

## 2019-01-01 DIAGNOSIS — E78.5 DYSLIPIDEMIA: Chronic | ICD-10-CM

## 2019-01-01 DIAGNOSIS — C83.33 DIFFUSE LARGE B-CELL LYMPHOMA OF INTRA-ABDOMINAL LYMPH NODES (HCC): Primary | ICD-10-CM

## 2019-01-01 DIAGNOSIS — Z00.00 ROUTINE GENERAL MEDICAL EXAMINATION AT A HEALTH CARE FACILITY: Primary | ICD-10-CM

## 2019-01-01 DIAGNOSIS — J43.9 PULMONARY EMPHYSEMA, UNSPECIFIED EMPHYSEMA TYPE (HCC): Primary | Chronic | ICD-10-CM

## 2019-01-01 DIAGNOSIS — M19.90 ARTHRITIS: Chronic | ICD-10-CM

## 2019-01-01 DIAGNOSIS — B18.2 CHRONIC HEPATITIS C WITHOUT HEPATIC COMA (HCC): ICD-10-CM

## 2019-01-01 DIAGNOSIS — J44.9 CHRONIC OBSTRUCTIVE PULMONARY DISEASE, UNSPECIFIED COPD TYPE (HCC): Chronic | ICD-10-CM

## 2019-01-01 DIAGNOSIS — D69.6 THROMBOCYTOPENIA (HCC): ICD-10-CM

## 2019-01-01 DIAGNOSIS — M25.511 ACUTE PAIN OF RIGHT SHOULDER: ICD-10-CM

## 2019-01-01 DIAGNOSIS — M79.2 NEUROPATHIC PAIN: Primary | ICD-10-CM

## 2019-01-01 DIAGNOSIS — J40 BRONCHITIS: ICD-10-CM

## 2019-01-01 DIAGNOSIS — I87.2 CHRONIC VENOUS INSUFFICIENCY: ICD-10-CM

## 2019-01-01 LAB
ALBUMIN SERPL-MCNC: 3.5 G/DL (ref 3.5–5.1)
ALBUMIN SERPL-MCNC: 3.6 G/DL (ref 3.5–5.1)
ALBUMIN SERPL-MCNC: 3.9 G/DL (ref 3.5–5.1)
ALP BLD-CCNC: 116 U/L (ref 38–126)
ALP BLD-CCNC: 118 U/L (ref 38–126)
ALP BLD-CCNC: 119 U/L (ref 38–126)
ALT SERPL-CCNC: 25 U/L (ref 11–66)
ALT SERPL-CCNC: 25 U/L (ref 11–66)
ALT SERPL-CCNC: 44 U/L (ref 11–66)
ANION GAP SERPL CALCULATED.3IONS-SCNC: 10 MEQ/L (ref 8–16)
AST SERPL-CCNC: 40 U/L (ref 5–40)
AST SERPL-CCNC: 42 U/L (ref 5–40)
AST SERPL-CCNC: 58 U/L (ref 5–40)
BASINOPHIL, AUTOMATED: 0 % (ref 0–3)
BILIRUB SERPL-MCNC: 0.6 MG/DL (ref 0.3–1.2)
BILIRUB SERPL-MCNC: 0.7 MG/DL (ref 0.3–1.2)
BILIRUB SERPL-MCNC: 0.8 MG/DL (ref 0.3–1.2)
BILIRUBIN DIRECT: 0.3 MG/DL (ref 0–0.3)
BILIRUBIN DIRECT: < 0.2 MG/DL (ref 0–0.3)
BUN BLDV-MCNC: 16 MG/DL (ref 7–22)
BUN, WHOLE BLOOD: 12 MG/DL (ref 8–26)
BUN, WHOLE BLOOD: 15 MG/DL (ref 8–26)
CALCIUM SERPL-MCNC: 9 MG/DL (ref 8.5–10.5)
CHLORIDE BLD-SCNC: 103 MEQ/L (ref 98–111)
CHLORIDE, WHOLE BLOOD: 101 MEQ/L (ref 98–109)
CHLORIDE, WHOLE BLOOD: 104 MEQ/L (ref 98–109)
CHOLESTEROL, TOTAL: 142 MG/DL (ref 100–199)
CO2: 26 MEQ/L (ref 23–33)
CREAT SERPL-MCNC: 0.9 MG/DL (ref 0.4–1.2)
CREATININE, WHOLE BLOOD: 0.7 MG/DL (ref 0.5–1.2)
CREATININE, WHOLE BLOOD: 0.8 MG/DL (ref 0.5–1.2)
EOSINOPHILS RELATIVE PERCENT: 5 % (ref 0–4)
GFR SERPL CREATININE-BSD FRML MDRD: 86 ML/MIN/1.73M2
GFR, ESTIMATED: > 90 ML/MIN/1.73M2
GFR, ESTIMATED: > 90 ML/MIN/1.73M2
GLUCOSE BLD-MCNC: 103 MG/DL (ref 70–108)
GLUCOSE, WHOLE BLOOD: 70 MG/DL (ref 70–108)
GLUCOSE, WHOLE BLOOD: 94 MG/DL (ref 70–108)
HCT VFR BLD CALC: 34 % (ref 42–52)
HCT VFR BLD CALC: 35.6 % (ref 42–52)
HDLC SERPL-MCNC: 64 MG/DL
HEMOGLOBIN: 11.2 GM/DL (ref 14–18)
HEMOGLOBIN: 12 GM/DL (ref 14–18)
IONIZED CALCIUM, WHOLE BLOOD: 1.19 MMOL/L (ref 1.12–1.32)
IONIZED CALCIUM, WHOLE BLOOD: 1.21 MMOL/L (ref 1.12–1.32)
LDL CHOLESTEROL CALCULATED: 69 MG/DL
LV EF: 55 %
LVEF MODALITY: NORMAL
LYMPHOCYTES # BLD: 21 % (ref 15–47)
MCH RBC QN AUTO: 27.7 PG (ref 27–31)
MCH RBC QN AUTO: 28.7 PG (ref 27–31)
MCHC RBC AUTO-ENTMCNC: 33 GM/DL (ref 33–37)
MCHC RBC AUTO-ENTMCNC: 33.7 GM/DL (ref 33–37)
MCV RBC AUTO: 84 FL (ref 80–94)
MCV RBC AUTO: 85 FL (ref 80–94)
MONOCYTES: 11 % (ref 0–12)
PDW BLD-RTO: 13.3 % (ref 11.5–14.5)
PDW BLD-RTO: 13.9 % (ref 11.5–14.5)
PLATELET # BLD: 128 THOU/MM3 (ref 130–400)
PLATELET # BLD: 133 THOU/MM3 (ref 130–400)
PMV BLD AUTO: 8.5 FL (ref 7.4–10.4)
PMV BLD AUTO: 9.3 FL (ref 7.4–10.4)
POTASSIUM SERPL-SCNC: 4.1 MEQ/L (ref 3.5–5.2)
POTASSIUM, WHOLE BLOOD: 4.2 MEQ/L (ref 3.5–4.9)
POTASSIUM, WHOLE BLOOD: 4.3 MEQ/L (ref 3.5–4.9)
PROSTATE SPECIFIC ANTIGEN: 0.38 NG/ML (ref 0–1)
RBC # BLD: 4.04 MILL/MM3 (ref 4.7–6.1)
RBC # BLD: 4.18 MILL/MM3 (ref 4.7–6.1)
SEG NEUTROPHILS: 63 % (ref 43–75)
SEG NEUTROPHILS: 64 % (ref 43–75)
SEGMENTED NEUTROPHILS ABSOLUTE COUNT: 4 THOU/MM3 (ref 1.8–7.7)
SODIUM BLD-SCNC: 139 MEQ/L (ref 135–145)
SODIUM, WHOLE BLOOD: 138 MEQ/L (ref 138–146)
SODIUM, WHOLE BLOOD: 139 MEQ/L (ref 138–146)
TOTAL CO2, WHOLE BLOOD: 26 MEQ/L (ref 23–33)
TOTAL CO2, WHOLE BLOOD: 27 MEQ/L (ref 23–33)
TOTAL PROTEIN: 6.6 G/DL (ref 6.1–8)
TOTAL PROTEIN: 6.6 G/DL (ref 6.1–8)
TOTAL PROTEIN: 6.7 G/DL (ref 6.1–8)
TRIGL SERPL-MCNC: 46 MG/DL (ref 0–199)
TSH SERPL DL<=0.05 MIU/L-ACNC: 2.01 UIU/ML (ref 0.4–4.2)
WBC # BLD: 5.3 THOU/MM3 (ref 4.8–10.8)
WBC # BLD: 6.3 THOU/MM3 (ref 4.8–10.8)

## 2019-01-01 PROCEDURE — 77080 DXA BONE DENSITY AXIAL: CPT

## 2019-01-01 PROCEDURE — 36415 COLL VENOUS BLD VENIPUNCTURE: CPT

## 2019-01-01 PROCEDURE — G8417 CALC BMI ABV UP PARAM F/U: HCPCS | Performed by: FAMILY MEDICINE

## 2019-01-01 PROCEDURE — G8427 DOCREV CUR MEDS BY ELIG CLIN: HCPCS | Performed by: FAMILY MEDICINE

## 2019-01-01 PROCEDURE — 4004F PT TOBACCO SCREEN RCVD TLK: CPT | Performed by: INTERNAL MEDICINE

## 2019-01-01 PROCEDURE — 3023F SPIROM DOC REV: CPT | Performed by: FAMILY MEDICINE

## 2019-01-01 PROCEDURE — 99214 OFFICE O/P EST MOD 30 MIN: CPT | Performed by: FAMILY MEDICINE

## 2019-01-01 PROCEDURE — G0297 LDCT FOR LUNG CA SCREEN: HCPCS

## 2019-01-01 PROCEDURE — 3017F COLORECTAL CA SCREEN DOC REV: CPT | Performed by: FAMILY MEDICINE

## 2019-01-01 PROCEDURE — 4004F PT TOBACCO SCREEN RCVD TLK: CPT | Performed by: FAMILY MEDICINE

## 2019-01-01 PROCEDURE — 80047 BASIC METABLC PNL IONIZED CA: CPT

## 2019-01-01 PROCEDURE — 3017F COLORECTAL CA SCREEN DOC REV: CPT | Performed by: INTERNAL MEDICINE

## 2019-01-01 PROCEDURE — 99213 OFFICE O/P EST LOW 20 MIN: CPT | Performed by: INTERNAL MEDICINE

## 2019-01-01 PROCEDURE — G8484 FLU IMMUNIZE NO ADMIN: HCPCS | Performed by: INTERNAL MEDICINE

## 2019-01-01 PROCEDURE — 99211 OFF/OP EST MAY X REQ PHY/QHP: CPT

## 2019-01-01 PROCEDURE — G8926 SPIRO NO PERF OR DOC: HCPCS | Performed by: FAMILY MEDICINE

## 2019-01-01 PROCEDURE — G0438 PPPS, INITIAL VISIT: HCPCS | Performed by: FAMILY MEDICINE

## 2019-01-01 PROCEDURE — 97165 OT EVAL LOW COMPLEX 30 MIN: CPT

## 2019-01-01 PROCEDURE — G8417 CALC BMI ABV UP PARAM F/U: HCPCS | Performed by: INTERNAL MEDICINE

## 2019-01-01 PROCEDURE — 85025 COMPLETE CBC W/AUTO DIFF WBC: CPT

## 2019-01-01 PROCEDURE — 93306 TTE W/DOPPLER COMPLETE: CPT

## 2019-01-01 PROCEDURE — G8484 FLU IMMUNIZE NO ADMIN: HCPCS | Performed by: FAMILY MEDICINE

## 2019-01-01 PROCEDURE — G8427 DOCREV CUR MEDS BY ELIG CLIN: HCPCS | Performed by: INTERNAL MEDICINE

## 2019-01-01 PROCEDURE — 73030 X-RAY EXAM OF SHOULDER: CPT

## 2019-01-01 PROCEDURE — 99213 OFFICE O/P EST LOW 20 MIN: CPT | Performed by: FAMILY MEDICINE

## 2019-01-01 PROCEDURE — 85027 COMPLETE CBC AUTOMATED: CPT

## 2019-01-01 PROCEDURE — 80076 HEPATIC FUNCTION PANEL: CPT

## 2019-01-01 PROCEDURE — 97110 THERAPEUTIC EXERCISES: CPT

## 2019-01-01 RX ORDER — OXYCODONE HYDROCHLORIDE 5 MG/1
5 TABLET ORAL EVERY 4 HOURS PRN
Qty: 180 TABLET | Refills: 0 | Status: SHIPPED | OUTPATIENT
Start: 2019-01-01 | End: 2020-01-01 | Stop reason: SDUPTHER

## 2019-01-01 RX ORDER — LORATADINE 10 MG/1
10 TABLET ORAL DAILY
Qty: 30 TABLET | Refills: 3 | Status: SHIPPED | OUTPATIENT
Start: 2019-01-01

## 2019-01-01 RX ORDER — DOXYCYCLINE HYCLATE 100 MG/1
100 CAPSULE ORAL 2 TIMES DAILY
Qty: 20 CAPSULE | Refills: 0 | Status: SHIPPED | OUTPATIENT
Start: 2019-01-01 | End: 2019-01-01

## 2019-01-01 RX ORDER — OXYCODONE HYDROCHLORIDE 5 MG/1
5 TABLET ORAL EVERY 4 HOURS PRN
Qty: 180 TABLET | Refills: 0 | Status: SHIPPED | OUTPATIENT
Start: 2019-01-01 | End: 2019-01-01 | Stop reason: SDUPTHER

## 2019-01-01 RX ORDER — MORPHINE SULFATE 30 MG/1
30 TABLET, FILM COATED, EXTENDED RELEASE ORAL 2 TIMES DAILY
Qty: 60 TABLET | Refills: 0 | Status: SHIPPED | OUTPATIENT
Start: 2019-01-01 | End: 2019-01-01 | Stop reason: SDUPTHER

## 2019-01-01 RX ORDER — NALOXONE HYDROCHLORIDE 4 MG/.1ML
1 SPRAY NASAL PRN
Qty: 1 EACH | Refills: 1 | Status: CANCELLED | OUTPATIENT
Start: 2019-01-01

## 2019-01-01 RX ORDER — NAPROXEN 500 MG/1
TABLET ORAL
Qty: 60 TABLET | Refills: 2 | Status: SHIPPED | OUTPATIENT
Start: 2019-01-01

## 2019-01-01 RX ORDER — AZITHROMYCIN 250 MG/1
250 TABLET, FILM COATED ORAL SEE ADMIN INSTRUCTIONS
Qty: 6 TABLET | Refills: 0 | Status: SHIPPED | OUTPATIENT
Start: 2019-01-01 | End: 2019-01-01

## 2019-01-01 RX ORDER — BUTALBITAL, ACETAMINOPHEN AND CAFFEINE 50; 325; 40 MG/1; MG/1; MG/1
TABLET ORAL
Qty: 60 TABLET | Refills: 0 | Status: SHIPPED | OUTPATIENT
Start: 2019-01-01 | End: 2019-01-01 | Stop reason: SDUPTHER

## 2019-01-01 RX ORDER — CROMOLYN SODIUM 40 MG/ML
1 SOLUTION/ DROPS OPHTHALMIC 4 TIMES DAILY
Qty: 3 BOTTLE | Refills: 3 | Status: SHIPPED | OUTPATIENT
Start: 2019-01-01

## 2019-01-01 RX ORDER — CROMOLYN SODIUM 40 MG/ML
1 SOLUTION/ DROPS OPHTHALMIC 4 TIMES DAILY
Qty: 3 BOTTLE | Refills: 3 | Status: SHIPPED | OUTPATIENT
Start: 2019-01-01 | End: 2019-01-01 | Stop reason: SDUPTHER

## 2019-01-01 RX ORDER — PREDNISONE 20 MG/1
40 TABLET ORAL DAILY
Qty: 10 TABLET | Refills: 0 | Status: SHIPPED | OUTPATIENT
Start: 2019-01-01 | End: 2019-01-01

## 2019-01-01 RX ORDER — OXYCODONE HYDROCHLORIDE 5 MG/1
5 TABLET ORAL EVERY 4 HOURS PRN
Qty: 180 TABLET | Refills: 0 | Status: SHIPPED | OUTPATIENT
Start: 2019-01-01 | End: 2019-01-01

## 2019-01-01 RX ORDER — ALBUTEROL SULFATE 90 UG/1
AEROSOL, METERED RESPIRATORY (INHALATION)
Qty: 2 INHALER | Refills: 5 | Status: SHIPPED | OUTPATIENT
Start: 2019-01-01

## 2019-01-01 RX ORDER — ONDANSETRON 4 MG/1
TABLET, FILM COATED ORAL
Qty: 60 TABLET | Refills: 0 | Status: CANCELLED | OUTPATIENT
Start: 2019-01-01

## 2019-01-01 RX ORDER — OMEPRAZOLE 20 MG/1
CAPSULE, DELAYED RELEASE ORAL
Qty: 90 CAPSULE | Refills: 3 | Status: SHIPPED | OUTPATIENT
Start: 2019-01-01

## 2019-01-01 RX ORDER — BUTALBITAL, ACETAMINOPHEN AND CAFFEINE 50; 325; 40 MG/1; MG/1; MG/1
TABLET ORAL
Qty: 60 TABLET | Refills: 0 | Status: SHIPPED | OUTPATIENT
Start: 2019-01-01 | End: 2020-01-01 | Stop reason: SDUPTHER

## 2019-01-01 RX ORDER — NEBULIZER ACCESSORIES
EACH MISCELLANEOUS
Qty: 1 EACH | Refills: 0 | Status: SHIPPED | OUTPATIENT
Start: 2019-01-01

## 2019-01-01 RX ORDER — FLUTICASONE PROPIONATE 50 MCG
SPRAY, SUSPENSION (ML) NASAL
Qty: 3 BOTTLE | Refills: 3 | Status: SHIPPED | OUTPATIENT
Start: 2019-01-01

## 2019-01-01 RX ORDER — MORPHINE SULFATE 60 MG/1
60 TABLET, FILM COATED, EXTENDED RELEASE ORAL 2 TIMES DAILY
Qty: 60 TABLET | Refills: 0 | Status: SHIPPED | OUTPATIENT
Start: 2019-01-01 | End: 2019-01-01 | Stop reason: DRUGHIGH

## 2019-01-01 RX ORDER — FLUTICASONE PROPIONATE 50 MCG
SPRAY, SUSPENSION (ML) NASAL
Qty: 3 BOTTLE | Refills: 3 | Status: SHIPPED | OUTPATIENT
Start: 2019-01-01 | End: 2019-01-01 | Stop reason: SDUPTHER

## 2019-01-01 RX ORDER — BUDESONIDE AND FORMOTEROL FUMARATE DIHYDRATE 160; 4.5 UG/1; UG/1
AEROSOL RESPIRATORY (INHALATION)
Qty: 3 INHALER | Refills: 3 | Status: SHIPPED | OUTPATIENT
Start: 2019-01-01 | End: 2020-01-01 | Stop reason: CLARIF

## 2019-01-01 RX ORDER — OXYCODONE HYDROCHLORIDE 5 MG/1
5 TABLET ORAL EVERY 6 HOURS PRN
Qty: 120 TABLET | Refills: 0 | Status: SHIPPED | OUTPATIENT
Start: 2019-01-01 | End: 2019-01-01 | Stop reason: SDUPTHER

## 2019-01-01 RX ORDER — ALBUTEROL SULFATE 2.5 MG/3ML
SOLUTION RESPIRATORY (INHALATION)
Qty: 360 ML | Refills: 3 | Status: SHIPPED | OUTPATIENT
Start: 2019-01-01

## 2019-01-01 RX ORDER — NALOXONE HYDROCHLORIDE 4 MG/.1ML
1 SPRAY NASAL PRN
Qty: 1 EACH | Refills: 2 | Status: SHIPPED | OUTPATIENT
Start: 2019-01-01

## 2019-01-01 RX ORDER — MORPHINE SULFATE 30 MG/1
30 TABLET, FILM COATED, EXTENDED RELEASE ORAL 2 TIMES DAILY
Qty: 60 TABLET | Refills: 0 | Status: SHIPPED | OUTPATIENT
Start: 2019-01-01 | End: 2020-01-01 | Stop reason: SDUPTHER

## 2019-01-01 RX ORDER — MORPHINE SULFATE 30 MG/1
30 TABLET, FILM COATED, EXTENDED RELEASE ORAL 2 TIMES DAILY
Qty: 60 TABLET | Refills: 0 | Status: CANCELLED | OUTPATIENT
Start: 2019-01-01 | End: 2019-01-01

## 2019-01-01 RX ORDER — ONDANSETRON 4 MG/1
TABLET, FILM COATED ORAL
Qty: 60 TABLET | Refills: 0 | Status: SHIPPED | OUTPATIENT
Start: 2019-01-01 | End: 2019-01-01 | Stop reason: SDUPTHER

## 2019-01-01 RX ORDER — NAPROXEN 500 MG/1
TABLET ORAL
Qty: 60 TABLET | Refills: 0 | Status: SHIPPED | OUTPATIENT
Start: 2019-01-01 | End: 2019-01-01 | Stop reason: SDUPTHER

## 2019-01-01 RX ORDER — ONDANSETRON 4 MG/1
TABLET, FILM COATED ORAL
Qty: 60 TABLET | Refills: 0 | Status: SHIPPED | OUTPATIENT
Start: 2019-01-01

## 2019-01-01 ASSESSMENT — LIFESTYLE VARIABLES: HOW OFTEN DO YOU HAVE A DRINK CONTAINING ALCOHOL: 0

## 2019-01-01 ASSESSMENT — PATIENT HEALTH QUESTIONNAIRE - PHQ9
1. LITTLE INTEREST OR PLEASURE IN DOING THINGS: 0
SUM OF ALL RESPONSES TO PHQ QUESTIONS 1-9: 0
SUM OF ALL RESPONSES TO PHQ QUESTIONS 1-9: 0
2. FEELING DOWN, DEPRESSED OR HOPELESS: 0
SUM OF ALL RESPONSES TO PHQ QUESTIONS 1-9: 0
SUM OF ALL RESPONSES TO PHQ9 QUESTIONS 1 & 2: 0
SUM OF ALL RESPONSES TO PHQ QUESTIONS 1-9: 0

## 2019-01-02 ENCOUNTER — HOSPITAL ENCOUNTER (OUTPATIENT)
Dept: PHYSICAL THERAPY | Age: 59
Setting detail: THERAPIES SERIES
Discharge: HOME | End: 2019-01-02
Payer: MEDICARE

## 2019-01-02 DIAGNOSIS — K59.03 DRUG-INDUCED CONSTIPATION: ICD-10-CM

## 2019-01-02 DIAGNOSIS — R11.2 NAUSEA AND VOMITING, INTRACTABILITY OF VOMITING NOT SPECIFIED, UNSPECIFIED VOMITING TYPE: ICD-10-CM

## 2019-01-02 DIAGNOSIS — H10.13 ALLERGIC CONJUNCTIVITIS, BILATERAL: ICD-10-CM

## 2019-01-02 PROCEDURE — 97140 MANUAL THERAPY 1/> REGIONS: CPT

## 2019-01-02 RX ORDER — OLOPATADINE HYDROCHLORIDE 7 MG/ML
1 SOLUTION OPHTHALMIC DAILY
Qty: 3 BOTTLE | Refills: 3 | Status: SHIPPED | OUTPATIENT
Start: 2019-01-02 | End: 2019-01-01

## 2019-01-02 RX ORDER — POLYETHYLENE GLYCOL 3350 17 G/17G
POWDER, FOR SOLUTION ORAL
Qty: 527 G | Refills: 0 | Status: SHIPPED | OUTPATIENT
Start: 2019-01-02

## 2019-01-02 RX ORDER — ONDANSETRON 4 MG/1
TABLET, FILM COATED ORAL
Qty: 60 TABLET | Refills: 0 | Status: SHIPPED | OUTPATIENT
Start: 2019-01-02 | End: 2019-01-22 | Stop reason: SDUPTHER

## 2019-01-02 ASSESSMENT — PAIN DESCRIPTION - LOCATION: LOCATION: BACK

## 2019-01-02 ASSESSMENT — PAIN SCALES - GENERAL: PAINLEVEL_OUTOF10: 6

## 2019-01-02 ASSESSMENT — PAIN DESCRIPTION - ORIENTATION: ORIENTATION: LOWER

## 2019-01-02 ASSESSMENT — PAIN DESCRIPTION - DESCRIPTORS: DESCRIPTORS: RADIATING;SHOOTING

## 2019-01-02 ASSESSMENT — PAIN DESCRIPTION - PAIN TYPE: TYPE: CHRONIC PAIN

## 2019-01-04 DIAGNOSIS — Z98.890 S/P SHOULDER SURGERY: ICD-10-CM

## 2019-01-04 DIAGNOSIS — M75.101 TEAR OF RIGHT ROTATOR CUFF, UNSPECIFIED TEAR EXTENT: ICD-10-CM

## 2019-01-04 DIAGNOSIS — M51.35 DDD (DEGENERATIVE DISC DISEASE), THORACOLUMBAR: Chronic | ICD-10-CM

## 2019-01-04 DIAGNOSIS — S46.212S BICEPS TENDON RUPTURE, LEFT, SEQUELA: ICD-10-CM

## 2019-01-04 DIAGNOSIS — Z87.39 HISTORY OF SPINAL STENOSIS: Chronic | ICD-10-CM

## 2019-01-04 DIAGNOSIS — G89.4 CHRONIC PAIN SYNDROME: Chronic | ICD-10-CM

## 2019-01-04 DIAGNOSIS — M96.1 FAILED BACK SYNDROME: Chronic | ICD-10-CM

## 2019-01-04 RX ORDER — MORPHINE SULFATE 60 MG/1
60 TABLET, FILM COATED, EXTENDED RELEASE ORAL 2 TIMES DAILY
Qty: 60 TABLET | Refills: 0 | Status: SHIPPED | OUTPATIENT
Start: 2019-01-04 | End: 2019-02-04 | Stop reason: SDUPTHER

## 2019-01-10 ENCOUNTER — HOSPITAL ENCOUNTER (OUTPATIENT)
Dept: PHYSICAL THERAPY | Age: 59
Setting detail: THERAPIES SERIES
End: 2019-01-10
Payer: MEDICARE

## 2019-01-18 ENCOUNTER — HOSPITAL ENCOUNTER (OUTPATIENT)
Dept: PHYSICAL THERAPY | Age: 59
Setting detail: THERAPIES SERIES
End: 2019-01-18
Payer: MEDICARE

## 2019-01-21 DIAGNOSIS — Z87.39 HISTORY OF SPINAL STENOSIS: Chronic | ICD-10-CM

## 2019-01-21 DIAGNOSIS — G89.4 CHRONIC PAIN SYNDROME: Chronic | ICD-10-CM

## 2019-01-21 DIAGNOSIS — S46.212S BICEPS TENDON RUPTURE, LEFT, SEQUELA: ICD-10-CM

## 2019-01-21 DIAGNOSIS — M96.1 FAILED BACK SYNDROME: Chronic | ICD-10-CM

## 2019-01-21 DIAGNOSIS — M51.35 DDD (DEGENERATIVE DISC DISEASE), THORACOLUMBAR: Chronic | ICD-10-CM

## 2019-01-21 DIAGNOSIS — M75.101 TEAR OF RIGHT ROTATOR CUFF, UNSPECIFIED TEAR EXTENT: ICD-10-CM

## 2019-01-21 DIAGNOSIS — Z98.890 S/P SHOULDER SURGERY: ICD-10-CM

## 2019-01-21 RX ORDER — OXYCODONE HYDROCHLORIDE 5 MG/1
5 TABLET ORAL EVERY 4 HOURS PRN
Qty: 180 TABLET | Refills: 0 | Status: SHIPPED | OUTPATIENT
Start: 2019-01-21 | End: 2019-01-01 | Stop reason: SDUPTHER

## 2019-01-22 ENCOUNTER — OFFICE VISIT (OUTPATIENT)
Dept: FAMILY MEDICINE CLINIC | Age: 59
End: 2019-01-22
Payer: MEDICARE

## 2019-01-22 VITALS
RESPIRATION RATE: 18 BRPM | WEIGHT: 187 LBS | TEMPERATURE: 98.8 F | HEIGHT: 64 IN | HEART RATE: 84 BPM | BODY MASS INDEX: 31.92 KG/M2 | DIASTOLIC BLOOD PRESSURE: 74 MMHG | SYSTOLIC BLOOD PRESSURE: 120 MMHG

## 2019-01-22 DIAGNOSIS — J43.9 PULMONARY EMPHYSEMA, UNSPECIFIED EMPHYSEMA TYPE (HCC): ICD-10-CM

## 2019-01-22 DIAGNOSIS — I87.2 CHRONIC VENOUS INSUFFICIENCY: Chronic | ICD-10-CM

## 2019-01-22 DIAGNOSIS — B18.2 CHRONIC HEPATITIS C WITHOUT HEPATIC COMA (HCC): Chronic | ICD-10-CM

## 2019-01-22 DIAGNOSIS — M96.1 FAILED BACK SYNDROME: ICD-10-CM

## 2019-01-22 DIAGNOSIS — M51.35 DDD (DEGENERATIVE DISC DISEASE), THORACOLUMBAR: Primary | ICD-10-CM

## 2019-01-22 DIAGNOSIS — Z87.39 HISTORY OF SPINAL STENOSIS: ICD-10-CM

## 2019-01-22 DIAGNOSIS — I77.89 ENLARGED THORACIC AORTA (HCC): Chronic | ICD-10-CM

## 2019-01-22 DIAGNOSIS — I50.42 CHRONIC COMBINED SYSTOLIC AND DIASTOLIC HEART FAILURE (HCC): Chronic | ICD-10-CM

## 2019-01-22 DIAGNOSIS — G89.4 CHRONIC PAIN SYNDROME: ICD-10-CM

## 2019-01-22 DIAGNOSIS — R11.2 NAUSEA AND VOMITING, INTRACTABILITY OF VOMITING NOT SPECIFIED, UNSPECIFIED VOMITING TYPE: ICD-10-CM

## 2019-01-22 DIAGNOSIS — F17.210 CIGARETTE NICOTINE DEPENDENCE WITHOUT COMPLICATION: Chronic | ICD-10-CM

## 2019-01-22 PROCEDURE — G8484 FLU IMMUNIZE NO ADMIN: HCPCS | Performed by: FAMILY MEDICINE

## 2019-01-22 PROCEDURE — 99214 OFFICE O/P EST MOD 30 MIN: CPT | Performed by: FAMILY MEDICINE

## 2019-01-22 PROCEDURE — 3023F SPIROM DOC REV: CPT | Performed by: FAMILY MEDICINE

## 2019-01-22 PROCEDURE — 3017F COLORECTAL CA SCREEN DOC REV: CPT | Performed by: FAMILY MEDICINE

## 2019-01-22 PROCEDURE — G8427 DOCREV CUR MEDS BY ELIG CLIN: HCPCS | Performed by: FAMILY MEDICINE

## 2019-01-22 PROCEDURE — G0296 VISIT TO DETERM LDCT ELIG: HCPCS | Performed by: FAMILY MEDICINE

## 2019-01-22 PROCEDURE — G8926 SPIRO NO PERF OR DOC: HCPCS | Performed by: FAMILY MEDICINE

## 2019-01-22 PROCEDURE — 4004F PT TOBACCO SCREEN RCVD TLK: CPT | Performed by: FAMILY MEDICINE

## 2019-01-22 PROCEDURE — G8417 CALC BMI ABV UP PARAM F/U: HCPCS | Performed by: FAMILY MEDICINE

## 2019-01-22 RX ORDER — ALBUTEROL SULFATE 90 UG/1
AEROSOL, METERED RESPIRATORY (INHALATION)
Qty: 2 INHALER | Refills: 5 | Status: CANCELLED | OUTPATIENT
Start: 2019-01-22

## 2019-01-22 RX ORDER — ALBUTEROL SULFATE 90 UG/1
AEROSOL, METERED RESPIRATORY (INHALATION)
Qty: 2 INHALER | Refills: 5 | Status: SHIPPED | OUTPATIENT
Start: 2019-01-22 | End: 2019-01-01 | Stop reason: SDUPTHER

## 2019-01-22 RX ORDER — ONDANSETRON 4 MG/1
TABLET, FILM COATED ORAL
Qty: 60 TABLET | Refills: 0 | Status: SHIPPED | OUTPATIENT
Start: 2019-01-22 | End: 2019-01-01 | Stop reason: SDUPTHER

## 2019-01-24 ENCOUNTER — HOSPITAL ENCOUNTER (OUTPATIENT)
Dept: PHYSICAL THERAPY | Age: 59
Setting detail: THERAPIES SERIES
End: 2019-01-24
Payer: MEDICARE

## 2019-02-01 DIAGNOSIS — M19.90 ARTHRITIS: Chronic | ICD-10-CM

## 2019-02-01 RX ORDER — NAPROXEN 500 MG/1
TABLET ORAL
Qty: 60 TABLET | Refills: 0 | Status: SHIPPED | OUTPATIENT
Start: 2019-02-01 | End: 2019-01-01 | Stop reason: SDUPTHER

## 2019-02-04 DIAGNOSIS — S46.212S BICEPS TENDON RUPTURE, LEFT, SEQUELA: ICD-10-CM

## 2019-02-04 DIAGNOSIS — M51.35 DDD (DEGENERATIVE DISC DISEASE), THORACOLUMBAR: Chronic | ICD-10-CM

## 2019-02-04 DIAGNOSIS — M96.1 FAILED BACK SYNDROME: Chronic | ICD-10-CM

## 2019-02-04 DIAGNOSIS — G89.4 CHRONIC PAIN SYNDROME: Chronic | ICD-10-CM

## 2019-02-04 DIAGNOSIS — Z98.890 S/P SHOULDER SURGERY: ICD-10-CM

## 2019-02-04 DIAGNOSIS — M75.101 TEAR OF RIGHT ROTATOR CUFF, UNSPECIFIED TEAR EXTENT: ICD-10-CM

## 2019-02-04 DIAGNOSIS — Z87.39 HISTORY OF SPINAL STENOSIS: Chronic | ICD-10-CM

## 2019-02-04 RX ORDER — MORPHINE SULFATE 60 MG/1
60 TABLET, FILM COATED, EXTENDED RELEASE ORAL 2 TIMES DAILY
Qty: 60 TABLET | Refills: 0 | Status: SHIPPED | OUTPATIENT
Start: 2019-02-04 | End: 2019-01-01 | Stop reason: SDUPTHER

## 2019-02-05 ENCOUNTER — TELEPHONE (OUTPATIENT)
Dept: FAMILY MEDICINE CLINIC | Age: 59
End: 2019-02-05

## 2019-02-05 DIAGNOSIS — J01.90 ACUTE RHINOSINUSITIS: Primary | ICD-10-CM

## 2019-02-05 DIAGNOSIS — J44.1 CHRONIC OBSTRUCTIVE PULMONARY DISEASE WITH ACUTE EXACERBATION (HCC): ICD-10-CM

## 2019-02-05 RX ORDER — DOXYCYCLINE HYCLATE 100 MG/1
100 CAPSULE ORAL 2 TIMES DAILY
Qty: 20 CAPSULE | Refills: 0 | Status: SHIPPED | OUTPATIENT
Start: 2019-02-05 | End: 2019-01-01

## 2019-02-05 RX ORDER — BENZONATATE 100 MG/1
CAPSULE ORAL
Qty: 60 CAPSULE | Refills: 0 | Status: SHIPPED | OUTPATIENT
Start: 2019-02-05 | End: 2019-01-01

## 2019-02-05 RX ORDER — PREDNISONE 20 MG/1
40 TABLET ORAL DAILY
Qty: 10 TABLET | Refills: 0 | Status: SHIPPED | OUTPATIENT
Start: 2019-02-05 | End: 2019-02-10

## 2019-02-06 ENCOUNTER — TELEPHONE (OUTPATIENT)
Dept: FAMILY MEDICINE CLINIC | Age: 59
End: 2019-02-06

## 2019-02-08 ENCOUNTER — HOSPITAL ENCOUNTER (OUTPATIENT)
Dept: PHYSICAL THERAPY | Age: 59
Setting detail: THERAPIES SERIES
Discharge: HOME | End: 2019-02-08
Payer: MEDICARE

## 2019-02-08 PROCEDURE — 97140 MANUAL THERAPY 1/> REGIONS: CPT

## 2019-03-19 PROBLEM — Z79.899 CONTROLLED SUBSTANCE AGREEMENT SIGNED: Status: ACTIVE | Noted: 2019-01-01

## 2019-03-19 PROBLEM — Z79.899 CONTROLLED SUBSTANCE AGREEMENT SIGNED: Chronic | Status: ACTIVE | Noted: 2019-01-01

## 2019-03-19 PROBLEM — M25.511 ACUTE PAIN OF RIGHT SHOULDER: Status: ACTIVE | Noted: 2019-01-01

## 2019-04-02 NOTE — TELEPHONE ENCOUNTER
Pt LMOM  @ office pt  States he wants to change  Pain medication around . Called  Pt back to find out how he wants to  change his medications. No answer LMOM to call back at earliest convenience.

## 2019-04-03 NOTE — TELEPHONE ENCOUNTER
We can try that, yes    ASSESSMENT & PLAN  1. History of spinal stenosis    - morphine (MS CONTIN) 30 MG extended release tablet; Take 1 tablet by mouth 2 times daily for 30 days. Dispense: 60 tablet; Refill: 0    2. DDD (degenerative disc disease), thoracolumbar    - morphine (MS CONTIN) 30 MG extended release tablet; Take 1 tablet by mouth 2 times daily for 30 days. Dispense: 60 tablet; Refill: 0    3. Chronic pain syndrome    - morphine (MS CONTIN) 30 MG extended release tablet; Take 1 tablet by mouth 2 times daily for 30 days. Dispense: 60 tablet; Refill: 0    4. Failed back syndrome    - morphine (MS CONTIN) 30 MG extended release tablet; Take 1 tablet by mouth 2 times daily for 30 days. Dispense: 60 tablet; Refill: 0    5. Biceps tendon rupture, left, sequela    - morphine (MS CONTIN) 30 MG extended release tablet; Take 1 tablet by mouth 2 times daily for 30 days. Dispense: 60 tablet; Refill: 0    6. S/P shoulder surgery    - morphine (MS CONTIN) 30 MG extended release tablet; Take 1 tablet by mouth 2 times daily for 30 days. Dispense: 60 tablet; Refill: 0    7. Tear of right rotator cuff, unspecified tear extent    - morphine (MS CONTIN) 30 MG extended release tablet; Take 1 tablet by mouth 2 times daily for 30 days. Dispense: 60 tablet; Refill: 0      OAARS reviewed and appropriate. Controlled Substances Monitoring: Attestation: The Prescription Monitoring Report for this patient was reviewed today. Sourav Bautista DO)  Chronic Pain Routine Monitoring: Possible medication side effects, risk of tolerance/dependence & alternative treatments discussed., Obtaining appropriate analgesic effect of treatment., No signs of potential drug abuse or diversion identified: otherwise, see note documentation Sourav Bautista DO)  Chronic Pain > 80 MEDD: Looked for signs of prescription misuse., Obtained or confirmed a written medication contract was on file.  Sourav Bautista DO)      Future Appointments   Date Time Provider Sara Almonte   4/23/2019  2:00 PM Wilder Johnson DO [facility] [place]   8/29/2019 11:00 AM Ashly Hand MD Oncology [facility] - [place] [facility]   9/13/2019 10:15 AM Jason Adam Urology [facility] - [place]

## 2019-04-15 NOTE — TELEPHONE ENCOUNTER
----- Message from Krystal Cassidy DO sent at 4/15/2019  1:21 PM EDT -----  Please let pt know that xray shows no fracture. Does show arthritic changes. con't with plan as discussed in clinic. Let me know if questions, thanks!

## 2019-04-15 NOTE — TELEPHONE ENCOUNTER
ASSESSMENT & PLAN  1. Biceps tendon rupture, left, sequela    - oxyCODONE (ROXICODONE) 5 MG immediate release tablet; Take 1 tablet by mouth every 4 hours as needed for Pain for up to 30 days. Dispense: 180 tablet; Refill: 0    2. S/P shoulder surgery    - oxyCODONE (ROXICODONE) 5 MG immediate release tablet; Take 1 tablet by mouth every 4 hours as needed for Pain for up to 30 days. Dispense: 180 tablet; Refill: 0    3. Tear of right rotator cuff, unspecified tear extent    - oxyCODONE (ROXICODONE) 5 MG immediate release tablet; Take 1 tablet by mouth every 4 hours as needed for Pain for up to 30 days. Dispense: 180 tablet; Refill: 0    4. DDD (degenerative disc disease), thoracolumbar    - oxyCODONE (ROXICODONE) 5 MG immediate release tablet; Take 1 tablet by mouth every 4 hours as needed for Pain for up to 30 days. Dispense: 180 tablet; Refill: 0    5. Failed back syndrome    - oxyCODONE (ROXICODONE) 5 MG immediate release tablet; Take 1 tablet by mouth every 4 hours as needed for Pain for up to 30 days. Dispense: 180 tablet; Refill: 0    6. History of spinal stenosis    - oxyCODONE (ROXICODONE) 5 MG immediate release tablet; Take 1 tablet by mouth every 4 hours as needed for Pain for up to 30 days. Dispense: 180 tablet; Refill: 0    7. Chronic pain syndrome    - oxyCODONE (ROXICODONE) 5 MG immediate release tablet; Take 1 tablet by mouth every 4 hours as needed for Pain for up to 30 days. Dispense: 180 tablet; Refill: 0    8. Controlled substance agreement signed    - oxyCODONE (ROXICODONE) 5 MG immediate release tablet; Take 1 tablet by mouth every 4 hours as needed for Pain for up to 30 days. Dispense: 180 tablet; Refill: 0      OAARS reviewed and appropriate. Controlled Substances Monitoring: Attestation: The Prescription Monitoring Report for this patient was reviewed today.  Jordan Moscoso DO)  Chronic Pain Routine Monitoring: Possible medication side effects, risk of tolerance/dependence & alternative treatments discussed., No signs of potential drug abuse or diversion identified: otherwise, see note documentation, Obtaining appropriate analgesic effect of treatment. BHUPINDER Martin  Chronic Pain > 80 MEDD: Obtained or confirmed a written medication contract was on file.  Melody Villanueva DO)      Future Appointments   Date Time Provider Sara Almonte   4/23/2019  2:00 PM Melody Villanueva [facility] [place]   8/29/2019 11:00 AM [facility]   9/13/2019 10:15 AM Jason Zazueta Urology [facility] [place]

## 2019-04-23 NOTE — PROGRESS NOTES
Chief Complaint   Patient presents with    3 Month Follow-Up     Chronic issues    Shoulder Pain     R    Weight Gain       History obtained from the patient. SUBJECTIVE:  Sherrie Shah is a 59 y.o. male that presents today for     -01. R Shoulder Pain LAST VISIT    HPI:  R hand dominant. Pt fell a wk ago, handed forward. Tripped on a rub. Now having R shoulder pain. R shoulder hasn't gotten much better over the last wk. No pain at rest. Hurts to life arm over head or reach behind his back. No weakness in the shoulder that he's noticed. The pain is intermittent. The patient describes the pain as aching and throbbing. Inciting injury or history of trauma? Yes  Pain is relieved by - rest, pain medication  Pain is aggravated by - movement  Radiation of the pain? No  Paresthesias of the extremities? No  Decreased ROM? Yes  Treatments tried - none    UPDATE TODAY: no change in above. Hasn't done OT yet. Needs new referral.        -02. Chronic pain PRIOR VISIT s/p L biceps repair with Dr. Elis Anthony yesterday. Dr. Inés Luevano wanted me to manage his pain since I have him on pain medication for chronic pain. Currently on MS contin 30mg bid and oxy IR 20mg q6h prn (we're working to get him to 0.5 to 1 tab po q8h prn and eventually to 10mg tid prn). But, d/t recent surgery need to escalate. Has enough oxy IR 20 and MS contin 30. Feels pain ok on what we have him on, just asking to take ox IR every 4 hours prn in the post-op period. Doing well post op. L arm in sling. Surgery went well.      UPDATE PRIOR VISIT: here for about 2 wks f/u. Is about out of oxy IR 20mg, a day or so early. He had initial issues with his MS Contin, not working and causing some itching, so he used more oxy. He is now taking MS Contin once daily and is working better for him and no side effects so is back to taking oxy as rx'd. Here for refill. Starting OT next wk. Out of sling.      UPDATE PRIOR VISIT: here for f/u.  Was to f/u labs wk, but was sick. I dec his oxy IR to 10mg q4h prn from 20mg q4h prn pain as was the plan before he cancelled his apt.  On 10 OCT, pt was started on MS contin 30mg bid to see if this would help with his chronic back pain. Initially he stated it was working, but then later stated it caused itching, so he decreased this to 30mg once daily and said that was working ok.     However, now has been taking mscontin again BID, itching gone, but not getting much pain relief. Asking if this can be increased. Oxy 10mg q4h prn not working well enough with the 30mg bid of mscontin. OAARS reviewed.      Has ortho f/u next wk. Missed 1st OT apt. Has f/u again soon.      UPDATE PRIOR VISIT: here for f/u. Not doing OT d/t car issues. Doing therapy at home. Pain in L shoulder getting better. MS contin working well at 60mg bid and we dec his ox to 10mg q6h prn from q4h prn 2 wks ago and has done fine with this. Due for refill of ox. Has 1 wk of morphine left. Overall stable if a bit better. No bowel issues.      UPDATE PRIOR VISIT: doing well with weaning down on pain meds. On 60mg bid of MS contin and oxy IR 10mg q6h prn. Willing to drop to 5mg q6h prn. Feels really good pain wise.       UPDATE PRIOR VISIT: pain stable. MS contin working fine at present dose. 30mg not nearly as effective. Doing ok with dropping oxy IR to 5m q6h prn from 10. In more pain than prior, but is tolerable. Doesn't want to adjust further at this time.       UPDATE PRIOR VISIT: MS contin at 60mg bid con't to work well. We had worked him down to oxy IR 5mg q6h, but this isn't holding him. Asking if can go do q4h prn. Feels this will get him to where he needs for his pain. We discussed his MEQ level today.       UPDATE PRIOR VISIT: pain stable on current regimen. We'd changed his oxy IR to 5mg q4h prn from q6h prn last visit. MS contin the same. Working well.       UPDATE PRIOR VISIT: regimen unchanged, oxy IR 5mg q6h prn and MS contin 60mg bid. Tolerating well. Pain controlled. Pain not controlled on lower MEQ at this time.      UPDATE PRIOR VISIT: mistake in last visit for this. He is on oxy IR 5mg q4h prn and MS contin 60mg bid. Have weaned him down quite a bit, but is still on about 165 MEQ . Have tried to get him lower, but have been unable to so far. Has been stable on this regimen for a few months now. Pain in L arm and R shoulder better. Never had surgery for R rotator cuff tear. States doing better, so plans to monitor.      UPDATE PRIOr VISIT: pain stable, on stable regimen for a few months. MEQ still at 165. Tried to get lower, but did not tolerate. Shoulder pain better. UPDATE PRIOR VISIT: pain is stable. On stble regimen. MEQ is still at 165. We discussed decreasing, he would like to continue for now as pain is controlled. No side effects. Long discussion on this, after this he's ok, at next fill of oxycodone, he's ok to change from q4h prn to q6h prn. Just filled q4h dosing yesterday. UPDATE LAST VISIT: PAIN STABLE. On stable regimen. MEQ at 165, he is willing to drop his oxy IR to q6h from q4h now. Pain contract updated. MS contin dose is stable     UPDATE TODAY: MS contin dec to 30mg bid and oxy 5mg was initially at q6h prn with 60mg of MS contin, but pt asked to change oxy to q4h prn and reduce MS Contin dose. MEQ down to 105. Doing well on adjustment. Needs controlled substance agreement updated.       -03. HLD:    HPI:    Taking meds as prescribed ?: yes  Tolerating well ?: yes  Side Effects ?: denies  Muscle Pain?: denies  Working on TLCS ?: yes      -04. COPD PRIOR VISIT: breathing close baseline. Taking Symbicort and prn alb. Typically uses alb once to twice per day. Worse the last wk d/t inc cough and mild wheezing. No SOB he states. No fevers.      UPDATE PRIOR VISIT: breathing stable. Is having inc cough and mucous production the last 2 wks. Still smoking. Using alb twice per wk yet. No SOB. Asking for ATB.        UPDATE LAST VISIT: breathing stable. Using meds as rx'd. No sob or wheeze. Down to 1 PPD from 1.5 PPD. UPDATE TODAY: breathign stable. Using meds as rx'd no SOB or wheezing. Down to 1/2 PPD smoking now. ,       -05. HCV LAST VISIT: never did f/u with GI, Obdulia Grimes, for treatment. Has apt about colo upcoming (he had to reschedule this from last visit), plans to f/u with him about that at that time. Was to start harvoni, but never did. UPDATE TODAY: plans to f/u with GI soon, but has not yet. No rUQ pain or jaundice.        -06. CHF/leg edema PRIOr VISIT: hx of, while on chemo. Seen cardio before, is to f/u at some point, he plans to scheduele. Last EF WNL. Denies CP, SOB orthopnea or PND. Does get on and off leg swelling from CVI, but better of late. Needs new rx for compression stockings      UPDATE LAST VISIT: doing better, last saw cardio 2 years ago. Denies CP, sOB, orthopnea or pND. Leg swelling better. Plans to make f/u cardio apt soon    UPDATE TODAY: has not f/u with cardio in over 2 years. Denies CP, SOB, orthopnea or PND. Leg swelling better. Plans to call cardio soon      -07. Osteopenia: on ronald with D. Due for dexa. No recent fxrs      -08. CLIFF: on buspar. Keeps anxiety under control.       -09. Weight gain: has gained 10 lbs over the last year. Not very active. Eating more than he had. No SOB or leg swelling      -10. Smoking PRIOR VISIT: down to 1 PPD. States can't take chantix or wellbutrin d/t side effects. Cannot afford patches or gum. Declines cessation    UPDATE LAST VISIT: down to 10 cig per day. Working on quitting rest of the way. Declines medical intervention     UPDATE TODAY: on 1/2 PPD yet. Working on quitting rest of the way. Declines interevention         Age/Gender Health Maintenance    Lipid - ;  LDL 71; HDL 65; TG 83 (JAN 2017)    Lab Results   Component Value Date    CHOL 159 03/21/2018    CHOL 108 01/30/2017    CHOL 179 01/09/2016     Lab Results   Component Value Date    TRIG 65 03/21/2018 TRIG 101 01/30/2017    TRIG 93 01/09/2016     Lab Results   Component Value Date    HDL 69 03/21/2018    HDL 40 01/30/2017    HDL 77 01/09/2016     Lab Results   Component Value Date    LDLCALC 77 03/21/2018    LDLCALC 48 01/30/2017    LDLCALC 83 01/09/2016         DM Screen - 96 (JAN 2016)    Lab Results   Component Value Date    GLUCOSE 88 11/13/2017    GLUCOSE 134 12/12/2011       Lung CA: NEG CT MARCH 2018  Colon Cancer Screening - NEG 3/13, repeat 5 years d/t questional fam hx (has upcoming apt with GI per pt APR 2018) had to reschedule July 2018/OCT 2018 still plans to call and schedule/JAN 2019, states plans to call and scahedule soon./same story APR 2019    Tetanus - UTD 2007  Influenza Vaccine - Declines DEC 2017/OCT 2018  Pneumonia Vaccine - UTD DEC 2014 PPV 23/wants to wait until next visit (Andria Benítez 2016)/MARCH 2016 as well/and July 2016/AND NOV 2016/MARCH 2017/declines July 2018 (States will get with flu FALL 2018)/OCT 2018/JAN 2019/declines APR 2019    Zostavax - UTD FALL 2014   Shingrix - to get at pharmacy per insurance    PSA testing discussion - 0.3 MAY 2016/0.3 (APR 2018)  AAA Screening - age 70    Specialist List    Oncology: Naty Shaffer  GI: Leonidas Sebastiandy: [facility]  ID: Mikayla Snow       Current Outpatient Medications   Medication Sig Dispense Refill    oxyCODONE (ROXICODONE) 5 MG immediate release tablet Take 1 tablet by mouth every 4 hours as needed for Pain for up to 30 days. 180 tablet 0    morphine (MS CONTIN) 30 MG extended release tablet Take 1 tablet by mouth 2 times daily for 30 days.  60 tablet 0    butalbital-acetaminophen-caffeine (FIORICET, ESGIC) -40 MG per tablet TAKE 1 TABLET BY MOUTH EVERY 6 HOURS AS NEEDED FOR HEADACHE 60 tablet 0    fluticasone (FLONASE) 50 MCG/ACT nasal spray USE 1 SPRAY IN EACH NOSTRIL ONCE DAILY 3 Bottle 3    naloxone 4 MG/0.1ML LIQD nasal spray 1 spray by Nasal route as needed for Opioid Reversal 1 each 2    Respiratory Therapy Supplies (NEBULIZER AIR TUBE/PLUGS) MISC Use as directed 1 each 0    cromolyn (OPTICROM) 4 % ophthalmic solution Place 1 drop into both eyes 4 times daily 3 Bottle 3    naproxen (NAPROSYN) 500 MG tablet TAKE 1 TABLET BY MOUTH TWICE DAILY AS NEEDED FOR PAIN 60 tablet 0    ondansetron (ZOFRAN) 4 MG tablet TAKE 1 TABLET BY MOUTH EVERY DAY AS NEEDED FOR NAUSEA AND VOMITING 60 tablet 0    albuterol sulfate HFA (VENTOLIN HFA) 108 (90 Base) MCG/ACT inhaler INHALE 2 PUFFS INTO THE LUNGS EVERY 6 HOURS AS NEEDED FOR WHEEZING 2 Inhaler 5    polyethylene glycol (GLYCOLAX) powder MIX 17 GRAMS IN 8 OZ OF LIQUID, AND DRINK EVERY DAY AS NEEDED FOR CONSTIPATION 527 g 0    lidocaine (XYLOCAINE) 2 % jelly APPLY TOPICALLY AS NEEDED 150 g 5    albuterol (PROVENTIL) (2.5 MG/3ML) 0.083% nebulizer solution USE 1 VIAL VIA NEBULIZER EVERY 4 HOURS AS NEEDED FOR WHEEZING OR SHORTNESS OF BREATH 360 mL 3    SYMBICORT 160-4.5 MCG/ACT AERO INHALE 2 PUFFS INTO THE LUNGS TWICE DAILY 3 Inhaler 3    Handicap Placard MISC by Does not apply route Expires 06 June 2023 1 each 0    Compression Stockings MISC by Does not apply route Bilateral compression stockings. 20-30mmHg 2 each 0    furosemide (LASIX) 20 MG tablet Take 2 tabs PO daily x 5 days, then, TAKE 1 TABLET BY MOUTH EVERY DAY AS NEEDED FOR LEG SWELLING 90 tablet 3    busPIRone (BUSPAR) 15 MG tablet TAKE 1 TABLET BY MOUTH EVERY 6 HOURS 360 tablet 3    omeprazole (PRILOSEC) 20 MG delayed release capsule TAKE 1 CAPSULE BY MOUTH DAILY 90 capsule 3    Nutritional Supplements (ENSURE) LIQD Low calorie Ensure (200 ronald).  Drink 2 cans per day 60 Can 5    Respiratory Therapy Supplies (NEBULIZER/TUBING/MOUTHPIECE) KIT COPD 1 kit 0    tiZANidine (ZANAFLEX) 4 MG tablet Take 1 tablet by mouth every 8 hours as needed (back pain) 90 tablet 1    guaiFENesin (MUCINEX) 600 MG extended release tablet Take 1 tablet by mouth 2 times daily as needed for Congestion 60 tablet 1    metoprolol tartrate (LOPRESSOR) 25 MG tablet TAKE 1 TABLET BY MOUTH TWICE DAILY 180 tablet 3    atorvastatin (LIPITOR) 10 MG tablet TAKE 1 TABLET BY MOUTH ONCE DAILY 90 tablet 3    calcium-cholecalciferol 500-200 MG-UNIT per tablet Take 1 tablet by mouth 2 times daily 180 tablet 3    aspirin 81 MG EC tablet Take 1 tablet by mouth daily 30 tablet 3    Misc. Devices MISC TENS UNIT ELECTRODE PADS    Use daily as needed for back pain. 4 each 3    Misc. Devices (QUAD CANE) MISC Adjustable narrow base quad cane 1 each 0    triamcinolone (KENALOG) 0.1 % cream Apply topically 2 times daily for up to 4 weeks, then weekends only as needed. 60 g 0    Multiple Vitamin (MULTIVITAMINS PO) Take 1 tablet by mouth daily.  Respiratory Therapy Supplies (NEBULIZER COMPRESSOR) KIT 1 kit by Does not apply route once for 1 dose This is for the nebulizer MACHINE 1 kit 0     No current facility-administered medications for this visit. Facility-Administered Medications Ordered in Other Visits   Medication Dose Route Frequency Provider Last Rate Last Dose    Absorbable Collagen Hemostat MISC 0.5 g  0.5 g Intra-Lesional Once Viji Begum MD         No orders of the defined types were placed in this encounter. All medications reviewed and reconciled, including OTC and herbal medications. Updated list given to patient.        Patient Active Problem List    Diagnosis Date Noted    Controlled substance agreement signed 03/19/2019    Acute pain of right shoulder 03/19/2019    Recurrent umbilical hernia 67/77/5348    Dyslipidemia     S/P shoulder surgery 12/07/2017    Biceps tendon rupture, left, sequela 11/02/2017    Tear of right rotator cuff 09/25/2017    Chronic venous insufficiency     Allergic conjunctivitis 06/20/2017    Microscopic hematuria 05/31/2017     Following with urology now      Rupture of distal biceps tendon 05/08/2017    Osteopenia     History of lymphoma     Chronic pain of right knee 01/20/2017    Bilateral edema of lower movements intact, conjunctivae and eye lids without erythema  ENT: external ear and ear canal clear bilaterally, TMs intact and regular, nose without deformity, nasal mucosa and turbinates normal without polyps, oropharynx normal, dentition is normal for age  Neck: supple and non-tender without mass, no thyromegaly or thyroid nodules, no cervical lymphadenopathy  Pulmonary/Chest: distant with good air movement bilaterally- no wheezing and rhonchi. No crackles. No accessory muscle use or distress. Cardiovascular: Distant, S1 and S2 auscultated w/ RRR. No murmurs, rubs, clicks, or gallops, distal pulses intact. Abdomen: soft, non-tender, non-distended, bowl sounds physiologic,  no rebound or guarding, no masses noted. Liver and spleen without enlargement. Extremities: no cyanosis, clubbing  of the lower extremities. +trace bilat ankle edema/calf edema. +2 PT/DP bilaterally. Neg homans bilat. Skin: warm and dry, no redness or rashes. R Shoulder:  SWELLING: none  DEFORMITY: none  TENDERNESS: mild  ROM: dec to into/ext rotation  STRENGTH: external rotation grade 5 of 5, internal rotation grade 5 of 5, supraspinatus grade 5 of 5, infraspinatus grade 5 of 5, belly press grade 5 of 5 and deltoid grade 5 of 5  STABILITY: normal  SPECIAL TESTS: Star Alexander' test: positive, Cross-chest abduction: negative, Yergason's test: negative, Speed's test: negative and Curtis's test: negative  Neurologic Exam: normal sensation and reflexes  Vascular Exam: radial pulse present and good color & capillary refill  Neck: supple, tender no and ROM: normal      Narrative   PROCEDURE: XR SHOULDER RIGHT (MIN 2 VIEWS)       CLINICAL INFORMATION: Pain following a fall       TECHNIQUE: 4 views of the right shoulder       COMPARISON: None       FINDINGS: There is no acute fracture or dislocation. Surgical changes are noted at the distal clavicle.  There is severe joint space narrowing, sclerosis and osteophyte formation at the glenohumeral joint. Soft tissues are unremarkable.           Impression   1. No acute fracture or dislocation. 2. Degenerative arthropathic changes involving the glenohumeral joint.       Final report electronically signed by Dr. Galina Beth on 4/15/2019 1:17 PM       ASSESSMENT & PLAN  1. Acute pain of right shoulder    Likely impingement from fall    Current pain meds  OT consult  If no better after OT, he is to call and will get MRI    - Comprehensive Metabolic Panel; Future  - TSH with Reflex; Future  - [address] [facility]    2. Fall in home, subsequent encounter    - Comprehensive Metabolic Panel; Future  - TSH with Reflex; Future  - [address] [facility]    3. DDD (degenerative disc disease), thoracolumbar      Chronic pain con't be an issue between L shoulder and failed back  MS contin recently dec to 30mg bid and doing well with this  Oxycodone kept at 5mg q4h prn  Currently down to 105 MEQ from 165  No plans to escalate, would need to see pain mamagement  I hope to de-escalate this further. This will be a slow process. He does have legitimate pain. And we have gotten his MEQ down quite a bit over time. I don't think I'll ever get him at [de-identified] or lower MEQ, but will con't to try    - Comprehensive Metabolic Panel; Future  - TSH with Reflex; Future    4. Failed back syndrome    As above    - Comprehensive Metabolic Panel; Future  - TSH with Reflex; Future    5. Chronic pain syndrome    As above    - Comprehensive Metabolic Panel; Future  - TSH with Reflex; Future    6. History of spinal stenosis    As above    - Comprehensive Metabolic Panel; Future  - TSH with Reflex; Future    7. Biceps tendon rupture, left, sequela    As above    - Comprehensive Metabolic Panel; Future  - TSH with Reflex; Future    8. S/P shoulder surgery    As above    - Comprehensive Metabolic Panel; Future  - TSH with Reflex; Future    9.  Tear of right rotator cuff, unspecified tear extent    As Eneida Riojas    - Comprehensive Metabolic Panel; Future  - TSH with Reflex; Future    10. Controlled substance agreement signed    Updated today    - Comprehensive Metabolic Panel; Future  - TSH with Reflex; Future    11. Dyslipidemia    Due for labs, orders reprinted  con't statin    - Comprehensive Metabolic Panel; Future  - TSH with Reflex; Future    12. Pulmonary emphysema, unspecified emphysema type ([facility] [address]    Stable  con't current in [facility]  COPd action plan reviewed    - Comprehensive Metabolic Panel; Future  - TSH with Reflex; Future    13. Chronic hepatitis C without hepatic coma (HCC)    Encouraged to f/u with GI, Dr. Afshan Beard, soon  CMP ordered    - Comprehensive Metabolic Panel; Future  - TSH with Reflex; Future    14. Chronic combined systolic and diastolic heart failure (HCC)    Stable  No sxs  con't meds  Low salt diet  Encouraged to f/u with cardio    - Comprehensive Metabolic Panel; Future  - TSH with Reflex; Future    15. Chronic venous insufficiency    Improved  con't diuretics    - Comprehensive Metabolic Panel; Future  - TSH with Reflex; Future    16. Osteopenia of multiple sites    con't ronald with D  Due for dexa    - Comprehensive Metabolic Panel; Future  - TSH with Reflex; Future  - DEXA Bone Density Axial Skeleton; Future    17. CLIFF (generalized anxiety disorder)    Stable  con't buspar    - Comprehensive Metabolic Panel; Future  - TSH with Reflex; Future    18. Overweight    Will check labs  Likely d/t diet and inactivity  Will f/u labs by phone  No s/s fluid overload    - Comprehensive Metabolic Panel; Future  - TSH with Reflex; Future    19. Cigarette nicotine dependence without complication    con't with home cessation efforts  F/u if unable to quit    - Comprehensive Metabolic Panel; Future  - TSH with Reflex; Future      DISPOSITION    Return in about 3 months (around 7/23/2019) for follow-up on chronic medical conditions, sooner as needed. Mark Woodward released without restrictions.     Future Appointments Date Time Provider Sara Suzy   7/23/2019  1:20 PM Elizabeth Wilhlem DO [facility] [email or web address]   8/29/2019 11:00 AM Kira Rothman MD [facility] [address] [email or web address]   9/13/2019 10:15 AM Jason Blue Urology [facility] [address] [facility] received counseling on the following healthy behaviors: nutrition, exercise, medication adherence and tobacco cessation    Patient given educational materials on: See Attached    I have instructed Leighann Billings to complete a self tracking handout on Smoking and instructed them to bring it with them to his next appointment. Barriers to learning and self management: none    Discussed use, benefit, and side effects of prescribed medications. Barriers to medication compliance addressed. All patient questions answered. Pt voiced understanding.        Electronically signed by Elizabeth Wilhelm DO on 4/23/2019 at 2:33 PM

## 2019-04-23 NOTE — TELEPHONE ENCOUNTER
Dexa bone density scheduled @ [facility] 4/29/19 @ 9790. Pt to arrive @ 1435. No calcium or multivitamins 24 hrs prior to the test.  Left detailed msg on machine. Ok per signed hipaa.

## 2019-04-23 NOTE — PATIENT INSTRUCTIONS
LAB INSTRUCTIONS:    Please complete labs within 2 week(s). Please fast for 8 hours prior to lab collection. The clinic will call you within 1 week of collection. If you have not heard from us within that amount of time, please call us at [phone]. Patient Education        Stopping Smoking: Care Instructions  Your Care Instructions  Cigarette smokers crave the nicotine in cigarettes. Giving it up is much harder than simply changing a habit. Your body has to stop craving the nicotine. It is hard to quit, but you can do it. There are many tools that people use to quit smoking. You may find that combining tools works best for you. There are several steps to quitting. First you get ready to quit. Then you get support to help you. After that, you learn new skills and behaviors to become a nonsmoker. For many people, a necessary step is getting and using medicine. Your doctor will help you set up the plan that best meets your needs. You may want to attend a smoking cessation program to help you quit smoking. When you choose a program, look for one that has proven success. Ask your doctor for ideas. You will greatly increase your chances of success if you take medicine as well as get counseling or join a cessation program.  Some of the changes you feel when you first quit tobacco are uncomfortable. Your body will miss the nicotine at first, and you may feel short-tempered and grumpy. You may have trouble sleeping or concentrating. Medicine can help you deal with these symptoms. You may struggle with changing your smoking habits and rituals. The last step is the tricky one: Be prepared for the smoking urge to continue for a time. This is a lot to deal with, but keep at it. You will feel better. Follow-up care is a key part of your treatment and safety. Be sure to make and go to all appointments, and call your doctor if you are having problems.  It's also a good idea to know your test results and keep a list withdrawal symptoms, such as stress and anxiety. · Some people find hypnosis, acupuncture, and massage helpful for ending the smoking habit. · Eat a healthy diet and get regular exercise. Having healthy habits will help your body move past its craving for nicotine. · Be prepared to keep trying. Most people are not successful the first few times they try to quit. Do not get mad at yourself if you smoke again. Make a list of things you learned and think about when you want to try again, such as next week, next month, or next year. Where can you learn more? Go to https://Mangstor.PedidosYa / PedidosJÃ¡. org and sign in to your [employer] account. Enter V799 in the [employer] box to learn more about \"Stopping Smoking: Care Instructions. \"     If you do not have an account, please click on the \"Sign Up Now\" link. Current as of: September 26, 2018  Content Version: 11.9  © 4185-2772 [employer], [employer]. Care instructions adapted under license by [facility] ([facility]). If you have questions about a medical condition or this instruction, always ask your healthcare professional. [place] [address] any warranty or liability for your use of this information.

## 2019-05-01 NOTE — TELEPHONE ENCOUNTER
ASSESSMENT & PLAN  1. Pulmonary emphysema, unspecified emphysema type (HCC)    - albuterol (PROVENTIL) (2.5 MG/3ML) 0.083% nebulizer solution; USE 1 VIAL VIA NEBULIZER EVERY 4 HOURS AS NEEDED FOR WHEEZING OR SHORTNESS OF BREATH  Dispense: 360 mL; Refill: 3    2. History of spinal stenosis    - morphine (MS CONTIN) 30 MG extended release tablet; Take 1 tablet by mouth 2 times daily for 30 days. Dispense: 60 tablet; Refill: 0    3. DDD (degenerative disc disease), thoracolumbar    - morphine (MS CONTIN) 30 MG extended release tablet; Take 1 tablet by mouth 2 times daily for 30 days. Dispense: 60 tablet; Refill: 0    4. Chronic pain syndrome    - morphine (MS CONTIN) 30 MG extended release tablet; Take 1 tablet by mouth 2 times daily for 30 days. Dispense: 60 tablet; Refill: 0    5. Failed back syndrome    - morphine (MS CONTIN) 30 MG extended release tablet; Take 1 tablet by mouth 2 times daily for 30 days. Dispense: 60 tablet; Refill: 0    6. Biceps tendon rupture, left, sequela    - morphine (MS CONTIN) 30 MG extended release tablet; Take 1 tablet by mouth 2 times daily for 30 days. Dispense: 60 tablet; Refill: 0    7. S/P shoulder surgery    - morphine (MS CONTIN) 30 MG extended release tablet; Take 1 tablet by mouth 2 times daily for 30 days. Dispense: 60 tablet; Refill: 0    8. Tear of right rotator cuff, unspecified tear extent    - morphine (MS CONTIN) 30 MG extended release tablet; Take 1 tablet by mouth 2 times daily for 30 days. Dispense: 60 tablet; Refill: 0      OAARS reviewed and appropriate. Controlled Substances Monitoring: Attestation: The Prescription Monitoring Report for this patient was reviewed today.  Elan Crenshaw DO)  Chronic Pain Routine Monitoring: Possible medication side effects, risk of tolerance/dependence & alternative treatments discussed., No signs of potential drug abuse or diversion identified: otherwise, see note documentation Elan Crenshaw DO)  Chronic Pain > 80 MEDD: Looked for signs of prescription misuse.  Naomi Tena DO)      Future Appointments   Date Time Provider Sara Almonte   5/6/2019  2:50 PM [facility] Radiolog   5/9/2019  4:20 PM STR CT IMAGING RM1  [facility] OUT EXP STR Radiolog   7/30/2019  1:20 PM Naomi Tena DO [facility] [place]   8/29/2019 11:00 AM Denis Ball MD Oncology [facility] [place]   9/13/2019 10:15 AM Jason Ratliff Urology [facility] [place]

## 2019-05-10 NOTE — TELEPHONE ENCOUNTER
----- Message from Anabela Aaltorre DO sent at 5/9/2019 10:18 PM EDT -----  Please let pt know that CT scan neg for lung cancer. It does show a small aneurysm again in his aorta as it leaves the heart. It was not seen on CT a year ago. It is very small. Recommend con't to work on smoking cessation and repeat CT chest in 1 year. Will call him to get done as time gets closer. Let me know if questions, thanks!

## 2019-05-13 NOTE — TELEPHONE ENCOUNTER
ASSESSMENT & PLAN  1. Biceps tendon rupture, left, sequela    - oxyCODONE (ROXICODONE) 5 MG immediate release tablet; Take 1 tablet by mouth every 4 hours as needed for Pain for up to 30 days. Dispense: 180 tablet; Refill: 0    2. S/P shoulder surgery    - oxyCODONE (ROXICODONE) 5 MG immediate release tablet; Take 1 tablet by mouth every 4 hours as needed for Pain for up to 30 days. Dispense: 180 tablet; Refill: 0    3. Tear of right rotator cuff, unspecified tear extent    - oxyCODONE (ROXICODONE) 5 MG immediate release tablet; Take 1 tablet by mouth every 4 hours as needed for Pain for up to 30 days. Dispense: 180 tablet; Refill: 0    4. DDD (degenerative disc disease), thoracolumbar    - oxyCODONE (ROXICODONE) 5 MG immediate release tablet; Take 1 tablet by mouth every 4 hours as needed for Pain for up to 30 days. Dispense: 180 tablet; Refill: 0    5. Failed back syndrome  - oxyCODONE (ROXICODONE) 5 MG immediate release tablet; Take 1 tablet by mouth every 4 hours as needed for Pain for up to 30 days. Dispense: 180 tablet; Refill: 0    6. History of spinal stenosis    - oxyCODONE (ROXICODONE) 5 MG immediate release tablet; Take 1 tablet by mouth every 4 hours as needed for Pain for up to 30 days. Dispense: 180 tablet; Refill: 0    7. Chronic pain syndrome    - oxyCODONE (ROXICODONE) 5 MG immediate release tablet; Take 1 tablet by mouth every 4 hours as needed for Pain for up to 30 days. Dispense: 180 tablet; Refill: 0    8. Controlled substance agreement signed    - oxyCODONE (ROXICODONE) 5 MG immediate release tablet; Take 1 tablet by mouth every 4 hours as needed for Pain for up to 30 days. Dispense: 180 tablet; Refill: 0    9. Arthritis    - naproxen (NAPROSYN) 500 MG tablet; TAKE 1 TABLET BY MOUTH TWICE DAILY AS NEEDED FOR PAIN  Dispense: 60 tablet; Refill: 0      OAARS reviewed and appropriate.      Controlled Substances Monitoring: Attestation: The Prescription Monitoring Report for this patient was reviewed today. Sil Tubbs DO)  Chronic Pain Routine Monitoring: Possible medication side effects, risk of tolerance/dependence & alternative treatments discussed., No signs of potential drug abuse or diversion identified: otherwise, see note documentation, Obtaining appropriate analgesic effect of treatment. Sil Tubbs DO)  Chronic Pain > 80 MEDD: Looked for signs of prescription misuse.  Sil Tubbs DO)      Future Appointments   Date Time Provider Sara Almonte   7/30/2019  1:20 PM Sil Tubbs DO [facility]   8/29/2019 11:00 AM Eve Mackay MD Oncology [facility]   9/13/2019 10:15 AM Jason Vaughn Urology [facility]

## 2019-05-20 NOTE — TELEPHONE ENCOUNTER
ASSESSMENT & PLAN  1. Chronic nonseasonal allergic rhinitis due to pollen    - fluticasone (FLONASE) 50 MCG/ACT nasal spray; USE 1 SPRAY IN EACH NOSTRIL ONCE DAILY  Dispense: 3 Bottle; Refill: 3  - cromolyn (OPTICROM) 4 % ophthalmic solution; Place 1 drop into both eyes 4 times daily  Dispense: 3 Bottle; Refill: 3  - loratadine (CLARITIN) 10 MG tablet; Take 1 tablet by mouth daily  Dispense: 30 tablet; Refill: 3    2. Allergic conjunctivitis, bilateral    - fluticasone (FLONASE) 50 MCG/ACT nasal spray; USE 1 SPRAY IN EACH NOSTRIL ONCE DAILY  Dispense: 3 Bottle; Refill: 3  - cromolyn (OPTICROM) 4 % ophthalmic solution; Place 1 drop into both eyes 4 times daily  Dispense: 3 Bottle; Refill: 3  - loratadine (CLARITIN) 10 MG tablet; Take 1 tablet by mouth daily  Dispense: 30 tablet;  Refill: 3

## 2019-05-20 NOTE — TELEPHONE ENCOUNTER
Patient is asking for something for his allergies. He says he has itchy eyes, stuffy and runny nose. He has tried some otc advil sinus and it didn't help. He says the allergies bother him every couple of years and has gotten rx before but doesn't know what it was.  Advised he may need appt but would check with .    [facility]  dolv 4/23  donv 7/30

## 2019-05-21 NOTE — TELEPHONE ENCOUNTER
AMB REFERRAL TO OCCUPATIONAL THERAPY-    Referral placed on 4/23/19. On 4/23/19 - L/m on v/m to set up therapy for OT    On 4/26/19 - Wants to wait about a week to schedule check back next week. (Friday)    On 5/3/19 - L/m on v/m to set up therapy. No more attempts to schedule will be made.   Ok to cancel referral?

## 2019-07-08 NOTE — TELEPHONE ENCOUNTER
ASSESSMENT & PLAN  1. Biceps tendon rupture, left, sequela    - oxyCODONE (ROXICODONE) 5 MG immediate release tablet; Take 1 tablet by mouth every 4 hours as needed for Pain for up to 30 days. Dispense: 180 tablet; Refill: 0    2. S/P shoulder surgery    - oxyCODONE (ROXICODONE) 5 MG immediate release tablet; Take 1 tablet by mouth every 4 hours as needed for Pain for up to 30 days. Dispense: 180 tablet; Refill: 0    3. Tear of right rotator cuff    - oxyCODONE (ROXICODONE) 5 MG immediate release tablet; Take 1 tablet by mouth every 4 hours as needed for Pain for up to 30 days. Dispense: 180 tablet; Refill: 0    4. DDD (degenerative disc disease), thoracolumbar    - oxyCODONE (ROXICODONE) 5 MG immediate release tablet; Take 1 tablet by mouth every 4 hours as needed for Pain for up to 30 days. Dispense: 180 tablet; Refill: 0    5. Failed back syndrome    - oxyCODONE (ROXICODONE) 5 MG immediate release tablet; Take 1 tablet by mouth every 4 hours as needed for Pain for up to 30 days. Dispense: 180 tablet; Refill: 0    6. History of spinal stenosis    - oxyCODONE (ROXICODONE) 5 MG immediate release tablet; Take 1 tablet by mouth every 4 hours as needed for Pain for up to 30 days. Dispense: 180 tablet; Refill: 0    7. Chronic pain syndrome    - oxyCODONE (ROXICODONE) 5 MG immediate release tablet; Take 1 tablet by mouth every 4 hours as needed for Pain for up to 30 days. Dispense: 180 tablet; Refill: 0    8. Controlled substance agreement signed    - oxyCODONE (ROXICODONE) 5 MG immediate release tablet; Take 1 tablet by mouth every 4 hours as needed for Pain for up to 30 days. Dispense: 180 tablet; Refill: 0      OAARS reviewed and appropriate. Controlled Substances Monitoring: Periodic Controlled Substance Monitoring: Possible medication side effects, risk of tolerance/dependence & alternative treatments discussed., No signs of potential drug abuse or diversion identified. , Assessed functional status. ,

## 2019-07-29 NOTE — PROGRESS NOTES
Readings from Last 3 Encounters:   07/30/19 162 lb (73.5 kg)   04/23/19 186 lb (84.4 kg)   03/19/19 185 lb (83.9 kg)       -TAA:  Noted on LDCT  Small. No CP/SOB  Smoking yet      -Smoking PRIOR VISIT: down to 1 PPD. States can't take chantix or wellbutrin d/t side effects. Cannot afford patches or gum. Declines cessation    UPDATE PRIOR VISIT: down to 10 cig per day. Working on quitting rest of the way. Declines medical intervention     UPDATE LAST VISIT: on 1/2 PPD yet. Working on quitting rest of the way. Declines interevention     UPDATE TODAY: down to 6 cig per day. Working on quitting the rest of the way, declines interventions       Age/Gender Health Maintenance    Lipid - ;  LDL 71; HDL 65; TG 83 (JAN 2017)    Lab Results   Component Value Date    CHOL 159 03/21/2018    CHOL 108 01/30/2017    CHOL 179 01/09/2016     Lab Results   Component Value Date    TRIG 65 03/21/2018    TRIG 101 01/30/2017    TRIG 93 01/09/2016     Lab Results   Component Value Date    HDL 69 03/21/2018    HDL 40 01/30/2017    HDL 77 01/09/2016     Lab Results   Component Value Date    LDLCALC 77 03/21/2018    LDLCALC 48 01/30/2017    LDLCALC 83 01/09/2016         DM Screen - 96 (JAN 2016)    Lab Results   Component Value Date    GLUCOSE 88 11/13/2017    GLUCOSE 134 12/12/2011       Lung CA: NEG CT MAY 2019  Colon Cancer Screening - NEG 3/13, repeat 5 years d/t questional fam hx (has upcoming apt with GI per pt APR 2018) had to reschedule July 2018/OCT 2018 still plans to call and schedule/JAN 2019, states plans to call and scahedule soon./same story APR 2019/plans to call tomorrow to schedule July 2019    Tetanus - UTD 2007  Influenza Vaccine - Declines DEC 2017/OCT 2018  Pneumonia Vaccine - UTD DEC 2014 PPV 23/wants to wait until next visit (Werner Arreguin 2016)/MARCH 2016 as well/and July 2016/AND NOV 2016/MARCH 2017/declines July 2018 (States will get with flu FALL 2018)/OCT 2018/JAN 2019/declines APR 2019    Zostavax - UTD FALL 2014

## 2019-07-30 NOTE — PATIENT INSTRUCTIONS
symptoms, such as stress and anxiety. · Some people find hypnosis, acupuncture, and massage helpful for ending the smoking habit. · Eat a healthy diet and get regular exercise. Having healthy habits will help your body move past its craving for nicotine. · Be prepared to keep trying. Most people are not successful the first few times they try to quit. Do not get mad at yourself if you smoke again. Make a list of things you learned and think about when you want to try again, such as next week, next month, or next year. Where can you learn more? Go to https://PriztagpeClickandBuy.[employer]. org and sign in to your [employer] account. Enter L955 in the [employer] box to learn more about \"Stopping Smoking: Care Instructions. \"     If you do not have an account, please click on the \"Sign Up Now\" link. Current as of: September 26, 2018  Content Version: 12.0  © 5501-8863 [employer]. Care instructions adapted under license by [facility] ([place] [facility]). If you have questions about a medical condition or this instruction, always ask your healthcare professional. [place] [address] any warranty or liability for your use of this information.

## 2019-08-02 NOTE — TELEPHONE ENCOUNTER
ASSESSMENT & PLAN  1. Biceps tendon rupture, left, sequela    - oxyCODONE (ROXICODONE) 5 MG immediate release tablet; Take 1 tablet by mouth every 4 hours as needed for Pain for up to 30 days. Dispense: 180 tablet; Refill: 0    2. S/P shoulder surgery    - oxyCODONE (ROXICODONE) 5 MG immediate release tablet; Take 1 tablet by mouth every 4 hours as needed for Pain for up to 30 days. Dispense: 180 tablet; Refill: 0    3. Tear of right rotator cuff    - oxyCODONE (ROXICODONE) 5 MG immediate release tablet; Take 1 tablet by mouth every 4 hours as needed for Pain for up to 30 days. Dispense: 180 tablet; Refill: 0    4. DDD (degenerative disc disease), thoracolumbar    - oxyCODONE (ROXICODONE) 5 MG immediate release tablet; Take 1 tablet by mouth every 4 hours as needed for Pain for up to 30 days. Dispense: 180 tablet; Refill: 0    5. Failed back syndrome    - oxyCODONE (ROXICODONE) 5 MG immediate release tablet; Take 1 tablet by mouth every 4 hours as needed for Pain for up to 30 days. Dispense: 180 tablet; Refill: 0    6. History of spinal stenosis    - oxyCODONE (ROXICODONE) 5 MG immediate release tablet; Take 1 tablet by mouth every 4 hours as needed for Pain for up to 30 days. Dispense: 180 tablet; Refill: 0    7. Chronic pain syndrome    - oxyCODONE (ROXICODONE) 5 MG immediate release tablet; Take 1 tablet by mouth every 4 hours as needed for Pain for up to 30 days. Dispense: 180 tablet; Refill: 0    8. Controlled substance agreement signed    - oxyCODONE (ROXICODONE) 5 MG immediate release tablet; Take 1 tablet by mouth every 4 hours as needed for Pain for up to 30 days. Dispense: 180 tablet; Refill: 0      OAARS reviewed and appropriate.      Controlled Substances Monitoring: Periodic Controlled Substance Monitoring: Possible medication side effects, risk of tolerance/dependence & alternative treatments discussed., No signs of potential drug abuse or diversion identified., Obtaining appropriate analgesic

## 2019-08-19 NOTE — TELEPHONE ENCOUNTER
Pt informed. He states he would like to be referred to someone closer that Dr Douglas Hanley. Requested pt to check with his insurance to see who is covered and closer, and let us know who he would like referred to. Pt agreed with this.

## 2019-08-27 PROBLEM — G89.29 CHRONIC RIGHT SHOULDER PAIN: Status: ACTIVE | Noted: 2019-01-01

## 2019-08-27 PROBLEM — M25.511 CHRONIC RIGHT SHOULDER PAIN: Status: ACTIVE | Noted: 2019-01-01

## 2019-08-27 NOTE — PROGRESS NOTES
60mg bid con't to work well. We had worked him down to oxy IR 5mg q6h, but this isn't holding him. Asking if can go do q4h prn. Feels this will get him to where he needs for his pain. We discussed his MEQ level today.       UPDATE PRIOR VISIT: pain stable on current regimen. We'd changed his oxy IR to 5mg q4h prn from q6h prn last visit. MS contin the same. Working well.       UPDATE PRIOR VISIT: regimen unchanged, oxy IR 5mg q6h prn and MS contin 60mg bid. Tolerating well. Pain controlled. Pain not controlled on lower MEQ at this time.      UPDATE PRIOR VISIT: mistake in last visit for this. He is on oxy IR 5mg q4h prn and MS contin 60mg bid. Have weaned him down quite a bit, but is still on about 165 MEQ . Have tried to get him lower, but have been unable to so far. Has been stable on this regimen for a few months now. Pain in L arm and R shoulder better. Never had surgery for R rotator cuff tear. States doing better, so plans to monitor.      UPDATE PRIOr VISIT: pain stable, on stable regimen for a few months. MEQ still at 165. Tried to get lower, but did not tolerate. Shoulder pain better. UPDATE PRIOR VISIT: pain is stable. On stble regimen. MEQ is still at 165. We discussed decreasing, he would like to continue for now as pain is controlled. No side effects. Long discussion on this, after this he's ok, at next fill of oxycodone, he's ok to change from q4h prn to q6h prn. Just filled q4h dosing yesterday. UPDATE PRIOR VISIT: PAIN STABLE. On stable regimen. MEQ at 165, he is willing to drop his oxy IR to q6h from q4h now. Pain contract updated. MS contin dose is stable     UPDATE PRIOR VISIT: MS contin dec to 30mg bid and oxy 5mg was initially at q6h prn with 60mg of MS contin, but pt asked to change oxy to q4h prn and reduce MS Contin dose. MEQ down to 105. Doing well on adjustment. Needs controlled substance agreement updated. UPDATE TODAY: on stable pain regimen. MEQ at 105 yet.  We've weaned this needed for Pain for up to 30 days. 180 tablet 0    ondansetron (ZOFRAN) 4 MG tablet TAKE 1 TABLET BY MOUTH EVERY DAY AS NEEDED FOR NAUSEA AND VOMITING 60 tablet 0    SYMBICORT 160-4.5 MCG/ACT AERO INHALE 2 PUFFS INTO LUNGS TWICE DAILY 3 Inhaler 3    naproxen (NAPROSYN) 500 MG tablet TAKE 1 TABLET BY MOUTH TWICE DAILY AS NEEDED FOR PAIN 60 tablet 2    fluticasone (FLONASE) 50 MCG/ACT nasal spray USE 1 SPRAY IN EACH NOSTRIL ONCE DAILY 3 Bottle 3    cromolyn (OPTICROM) 4 % ophthalmic solution Place 1 drop into both eyes 4 times daily 3 Bottle 3    loratadine (CLARITIN) 10 MG tablet Take 1 tablet by mouth daily 30 tablet 3    butalbital-acetaminophen-caffeine (FIORICET, ESGIC) -40 MG per tablet TAKE 1 TABLET BY MOUTH EVERY 6 HOURS AS NEEDED FOR HEADACHE 60 tablet 0    albuterol sulfate HFA (VENTOLIN HFA) 108 (90 Base) MCG/ACT inhaler INHALE 2 PUFFS INTO THE LUNGS EVERY 6 HOURS AS NEEDED FOR WHEEZING 2 Inhaler 5    albuterol (PROVENTIL) (2.5 MG/3ML) 0.083% nebulizer solution USE 1 VIAL VIA NEBULIZER EVERY 4 HOURS AS NEEDED FOR WHEEZING OR SHORTNESS OF BREATH 360 mL 3    naloxone 4 MG/0.1ML LIQD nasal spray 1 spray by Nasal route as needed for Opioid Reversal 1 each 2    Respiratory Therapy Supplies (NEBULIZER AIR TUBE/PLUGS) MISC Use as directed 1 each 0    polyethylene glycol (GLYCOLAX) powder MIX 17 GRAMS IN 8 OZ OF LIQUID, AND DRINK EVERY DAY AS NEEDED FOR CONSTIPATION 527 g 0    Handicap Placard MISC by Does not apply route Expires 06 June 2023 1 each 0    Compression Stockings MISC by Does not apply route Bilateral compression stockings.  20-30mmHg 2 each 0    furosemide (LASIX) 20 MG tablet Take 2 tabs PO daily x 5 days, then, TAKE 1 TABLET BY MOUTH EVERY DAY AS NEEDED FOR LEG SWELLING 90 tablet 3    busPIRone (BUSPAR) 15 MG tablet TAKE 1 TABLET BY MOUTH EVERY 6 HOURS 360 tablet 3    omeprazole (PRILOSEC) 20 MG delayed release capsule TAKE 1 CAPSULE BY MOUTH DAILY 90 capsule 3    Nutritional Supplements (ENSURE) LIQD Low calorie Ensure (200 ronald). Drink 2 cans per day 60 Can 5    Respiratory Therapy Supplies (NEBULIZER/TUBING/MOUTHPIECE) KIT COPD 1 kit 0    tiZANidine (ZANAFLEX) 4 MG tablet Take 1 tablet by mouth every 8 hours as needed (back pain) 90 tablet 1    guaiFENesin (MUCINEX) 600 MG extended release tablet Take 1 tablet by mouth 2 times daily as needed for Congestion 60 tablet 1    metoprolol tartrate (LOPRESSOR) 25 MG tablet TAKE 1 TABLET BY MOUTH TWICE DAILY 180 tablet 3    atorvastatin (LIPITOR) 10 MG tablet TAKE 1 TABLET BY MOUTH ONCE DAILY 90 tablet 3    calcium-cholecalciferol 500-200 MG-UNIT per tablet Take 1 tablet by mouth 2 times daily 180 tablet 3    aspirin 81 MG EC tablet Take 1 tablet by mouth daily 30 tablet 3    Misc. Devices MISC TENS UNIT ELECTRODE PADS    Use daily as needed for back pain. 4 each 3    Misc. Devices (QUAD CANE) MISC Adjustable narrow base quad cane 1 each 0    triamcinolone (KENALOG) 0.1 % cream Apply topically 2 times daily for up to 4 weeks, then weekends only as needed. 60 g 0    Multiple Vitamin (MULTIVITAMINS PO) Take 1 tablet by mouth daily.  lidocaine (XYLOCAINE) 2 % jelly Apply topically 3 times daily as needed for pain 150 g 5    Respiratory Therapy Supplies (NEBULIZER COMPRESSOR) KIT 1 kit by Does not apply route once for 1 dose This is for the nebulizer MACHINE 1 kit 0     No current facility-administered medications for this visit. Facility-Administered Medications Ordered in Other Visits   Medication Dose Route Frequency Provider Last Rate Last Dose    Absorbable Collagen Hemostat MISC 0.5 g  0.5 g Intra-Lesional Once Noe Garcia MD         Orders Placed This Encounter   Medications    morphine (MS CONTIN) 30 MG extended release tablet     Sig: Take 1 tablet by mouth 2 times daily for 30 days.      Dispense:  60 tablet     Refill:  0     Reduce doses taken as pain becomes manageable         All medications reviewed and reconciled, including OTC and herbal medications. Updated list given to patient. Patient Active Problem List    Diagnosis Date Noted    Chronic right shoulder pain 08/27/2019    Ascending aortic aneurysm ([address]     Controlled substance agreement signed 03/19/2019    Acute pain of right shoulder 03/19/2019    Recurrent umbilical hernia 27/41/4824    Dyslipidemia     S/P shoulder surgery 12/07/2017    Biceps tendon rupture, left, sequela 11/02/2017    Tear of right rotator cuff 09/25/2017    Chronic venous insufficiency     Allergic conjunctivitis 06/20/2017    Microscopic hematuria 05/31/2017     Following with urology now      Rupture of distal biceps tendon 05/08/2017    Osteopenia     History of lymphoma     Chronic pain of right knee 01/20/2017    Bilateral edema of lower extremity 06/23/2016    Enlarged thoracic aorta ([address]      Upper limits of normal size on CT chest with contrast 2016  Resolved on f/u CT [facility] 2018      Ventral hernia without obstruction or gangrene     Pulmonary nodules 09/15/2015     Being monitored by oncology      Cervical radiculopathy 09/15/2015    CLIFF (generalized anxiety disorder) 08/07/2015    Failed back syndrome 06/08/2015    Allergic rhinitis 06/08/2015    Chronic pain syndrome 04/05/2015    Insomnia 02/20/2015    History of chemotherapy 02/09/2015    Diffuse large B cell lymphoma ([address] follows with Dr José Mancuso 12/12/2014    Chronic combined systolic and diastolic heart failure ([address] 12/12/2014     EF 45-50% FEB 2015/JUNE 2016  However, AUG 2017:  Summary   Left Ventricular size is Normal .   Normal left ventricular wall thickness.   Ejection fraction is visually estimated 55 %.  Mildly enlarged right atrium size.   Mildly enlarged right ventricle cavity.   Mild tricuspid regurgitation visualized.       DDD (degenerative disc disease), thoracolumbar 12/01/2014    Arthritis     Sleep apnea      no CPAP      GERD (gastroesophageal reflux disease)

## 2019-08-29 PROBLEM — K76.9 LIVER DISEASE: Status: ACTIVE | Noted: 2019-01-01

## 2019-08-29 NOTE — PROGRESS NOTES
[address], [address] [place] [address], [address] [place] [address], [address] [place] [address], [address] [facility] [address] of the [facility] [place] at the [facility]      **This report has been created using voice recognition software. It may contain minor errors which are inherent in voice recognition technology. **

## 2019-08-30 NOTE — TELEPHONE ENCOUNTER
9/13/2019 10:15 AM ANNA Castellano - [facility] [place] [facility]   10/28/2019 12:40 PM Will Campbell DO [facility] [place]   2/27/2020 11:15 AM Brodie Stanley MD Oncology [facility] - [place]

## 2019-10-28 PROBLEM — K76.9 LIVER DISEASE: Status: RESOLVED | Noted: 2019-01-01 | Resolved: 2019-01-01

## 2020-01-01 ENCOUNTER — TELEPHONE (OUTPATIENT)
Dept: FAMILY MEDICINE CLINIC | Age: 60
End: 2020-01-01

## 2020-01-01 ENCOUNTER — HOSPITAL ENCOUNTER (EMERGENCY)
Age: 60
End: 2020-02-12
Attending: EMERGENCY MEDICINE
Payer: MEDICARE

## 2020-01-01 ENCOUNTER — OFFICE VISIT (OUTPATIENT)
Dept: FAMILY MEDICINE CLINIC | Age: 60
End: 2020-01-01
Payer: MEDICARE

## 2020-01-01 VITALS
TEMPERATURE: 98.1 F | DIASTOLIC BLOOD PRESSURE: 78 MMHG | SYSTOLIC BLOOD PRESSURE: 136 MMHG | RESPIRATION RATE: 16 BRPM | WEIGHT: 162 LBS | HEART RATE: 87 BPM | HEIGHT: 64 IN | BODY MASS INDEX: 27.66 KG/M2

## 2020-01-01 VITALS — OXYGEN SATURATION: 68 %

## 2020-01-01 LAB — GLUCOSE BLD-MCNC: 27 MG/DL (ref 70–108)

## 2020-01-01 PROCEDURE — 6360000002 HC RX W HCPCS: Performed by: EMERGENCY MEDICINE

## 2020-01-01 PROCEDURE — 3017F COLORECTAL CA SCREEN DOC REV: CPT | Performed by: FAMILY MEDICINE

## 2020-01-01 PROCEDURE — 2709999900 HC NON-CHARGEABLE SUPPLY

## 2020-01-01 PROCEDURE — G8417 CALC BMI ABV UP PARAM F/U: HCPCS | Performed by: FAMILY MEDICINE

## 2020-01-01 PROCEDURE — 96375 TX/PRO/DX INJ NEW DRUG ADDON: CPT

## 2020-01-01 PROCEDURE — 96376 TX/PRO/DX INJ SAME DRUG ADON: CPT

## 2020-01-01 PROCEDURE — 82948 REAGENT STRIP/BLOOD GLUCOSE: CPT

## 2020-01-01 PROCEDURE — G8484 FLU IMMUNIZE NO ADMIN: HCPCS | Performed by: FAMILY MEDICINE

## 2020-01-01 PROCEDURE — 3023F SPIROM DOC REV: CPT | Performed by: FAMILY MEDICINE

## 2020-01-01 PROCEDURE — G8427 DOCREV CUR MEDS BY ELIG CLIN: HCPCS | Performed by: FAMILY MEDICINE

## 2020-01-01 PROCEDURE — 2500000003 HC RX 250 WO HCPCS: Performed by: EMERGENCY MEDICINE

## 2020-01-01 PROCEDURE — 92950 HEART/LUNG RESUSCITATION CPR: CPT

## 2020-01-01 PROCEDURE — 2500000003 HC RX 250 WO HCPCS

## 2020-01-01 PROCEDURE — 96374 THER/PROPH/DIAG INJ IV PUSH: CPT

## 2020-01-01 PROCEDURE — 6360000002 HC RX W HCPCS

## 2020-01-01 PROCEDURE — 99214 OFFICE O/P EST MOD 30 MIN: CPT | Performed by: FAMILY MEDICINE

## 2020-01-01 PROCEDURE — G8926 SPIRO NO PERF OR DOC: HCPCS | Performed by: FAMILY MEDICINE

## 2020-01-01 PROCEDURE — 2580000003 HC RX 258

## 2020-01-01 PROCEDURE — 2580000003 HC RX 258: Performed by: EMERGENCY MEDICINE

## 2020-01-01 PROCEDURE — 99283 EMERGENCY DEPT VISIT LOW MDM: CPT

## 2020-01-01 PROCEDURE — 4004F PT TOBACCO SCREEN RCVD TLK: CPT | Performed by: FAMILY MEDICINE

## 2020-01-01 PROCEDURE — 94770 HC ETCO2 MONITOR DAILY: CPT

## 2020-01-01 RX ORDER — MORPHINE SULFATE 30 MG/1
30 TABLET, FILM COATED, EXTENDED RELEASE ORAL 2 TIMES DAILY
Qty: 60 TABLET | Refills: 0 | Status: SHIPPED | OUTPATIENT
Start: 2020-01-01 | End: 2020-01-01 | Stop reason: SDUPTHER

## 2020-01-01 RX ORDER — BUTALBITAL, ACETAMINOPHEN AND CAFFEINE 50; 325; 40 MG/1; MG/1; MG/1
TABLET ORAL
Qty: 60 TABLET | Refills: 0 | Status: SHIPPED | OUTPATIENT
Start: 2020-01-01

## 2020-01-01 RX ORDER — MORPHINE SULFATE 30 MG/1
30 TABLET, FILM COATED, EXTENDED RELEASE ORAL 2 TIMES DAILY
Qty: 60 TABLET | Refills: 0 | Status: SHIPPED | OUTPATIENT
Start: 2020-01-01 | End: 2020-03-07

## 2020-01-01 RX ORDER — OXYCODONE HYDROCHLORIDE 5 MG/1
5 TABLET ORAL EVERY 4 HOURS PRN
Qty: 180 TABLET | Refills: 0 | Status: SHIPPED | OUTPATIENT
Start: 2020-01-01 | End: 2020-02-16

## 2020-01-01 RX ORDER — FLUTICASONE FUROATE AND VILANTEROL 100; 25 UG/1; UG/1
1 POWDER RESPIRATORY (INHALATION) DAILY
Qty: 3 EACH | Refills: 3 | Status: SHIPPED | OUTPATIENT
Start: 2020-01-01

## 2020-01-01 RX ORDER — DEXTROSE MONOHYDRATE 25 G/50ML
INJECTION, SOLUTION INTRAVENOUS DAILY PRN
Status: COMPLETED | OUTPATIENT
Start: 2020-01-01 | End: 2020-01-01

## 2020-01-01 RX ADMIN — EPINEPHRINE 1 MG: 0.1 INJECTION, SOLUTION ENDOTRACHEAL; INTRACARDIAC; INTRAVENOUS at 23:50

## 2020-01-01 RX ADMIN — EPINEPHRINE 1 MG: 0.1 INJECTION, SOLUTION ENDOTRACHEAL; INTRACARDIAC; INTRAVENOUS at 00:08

## 2020-01-01 RX ADMIN — EPINEPHRINE 1 MG: 0.1 INJECTION, SOLUTION ENDOTRACHEAL; INTRACARDIAC; INTRAVENOUS at 00:10

## 2020-01-01 RX ADMIN — EPINEPHRINE 1 MG: 0.1 INJECTION, SOLUTION ENDOTRACHEAL; INTRACARDIAC; INTRAVENOUS at 00:13

## 2020-01-01 RX ADMIN — EPINEPHRINE 1 MG: 0.1 INJECTION, SOLUTION ENDOTRACHEAL; INTRACARDIAC; INTRAVENOUS at 00:02

## 2020-01-01 RX ADMIN — Medication 50 MEQ: at 00:02

## 2020-01-01 RX ADMIN — DEXTROSE MONOHYDRATE 50 G: 25 INJECTION, SOLUTION INTRAVENOUS at 23:52

## 2020-01-01 RX ADMIN — EPINEPHRINE 1 MG: 0.1 INJECTION, SOLUTION ENDOTRACHEAL; INTRACARDIAC; INTRAVENOUS at 00:05

## 2020-01-01 RX ADMIN — EPINEPHRINE 1 MG: 0.1 INJECTION, SOLUTION ENDOTRACHEAL; INTRACARDIAC; INTRAVENOUS at 23:59

## 2020-01-01 RX ADMIN — Medication 50 MEQ: at 23:57

## 2020-01-01 RX ADMIN — EPINEPHRINE 1 MG: 0.1 INJECTION, SOLUTION ENDOTRACHEAL; INTRACARDIAC; INTRAVENOUS at 23:53

## 2020-01-01 RX ADMIN — EPINEPHRINE 1 MG: 0.1 INJECTION, SOLUTION ENDOTRACHEAL; INTRACARDIAC; INTRAVENOUS at 23:56

## 2020-01-01 ASSESSMENT — PATIENT HEALTH QUESTIONNAIRE - PHQ9
SUM OF ALL RESPONSES TO PHQ QUESTIONS 1-9: 0
1. LITTLE INTEREST OR PLEASURE IN DOING THINGS: 0
2. FEELING DOWN, DEPRESSED OR HOPELESS: 0
SUM OF ALL RESPONSES TO PHQ QUESTIONS 1-9: 0
SUM OF ALL RESPONSES TO PHQ9 QUESTIONS 1 & 2: 0

## 2020-01-02 NOTE — TELEPHONE ENCOUNTER
[birth year]   Sofia Maharaj Sr. called requesting a refill on the following medications:  Requested Prescriptions     Pending Prescriptions Disp Refills    butalbital-acetaminophen-caffeine (FIORICET, ESGIC) -40 MG per tablet 60 tablet 0     Sig: TAKE 1 TABLET BY MOUTH EVERY 6 HOURS AS NEEDED FOR HEADACHE    morphine (MS CONTIN) 30 MG extended release tablet 60 tablet 0     Sig: Take 1 tablet by mouth 2 times daily for 30 days. Pharmacy verified: [facility]   . pv      Date of last visit:  12/20/19  Date of next visit (if applicable): 3/50/3804  blm

## 2020-01-02 NOTE — TELEPHONE ENCOUNTER
ASSESSMENT & PLAN  May Arellano was seen today for medication refill. Diagnoses and all orders for this visit:    Episodic tension-type headache, not intractable  -     butalbital-acetaminophen-caffeine (FIORICET, ESGIC) -40 MG per tablet; TAKE 1 TABLET BY MOUTH EVERY 6 HOURS AS NEEDED FOR HEADACHE    History of spinal stenosis  -     morphine (MS CONTIN) 30 MG extended release tablet; Take 1 tablet by mouth 2 times daily for 30 days. DDD (degenerative disc disease), thoracolumbar  -     morphine (MS CONTIN) 30 MG extended release tablet; Take 1 tablet by mouth 2 times daily for 30 days. Chronic pain syndrome  -     morphine (MS CONTIN) 30 MG extended release tablet; Take 1 tablet by mouth 2 times daily for 30 days. Failed back syndrome  -     morphine (MS CONTIN) 30 MG extended release tablet; Take 1 tablet by mouth 2 times daily for 30 days. OAARS reviewed and appropriate. Controlled Substances Monitoring: Periodic Controlled Substance Monitoring: Possible medication side effects, risk of tolerance/dependence & alternative treatments discussed., No signs of potential drug abuse or diversion identified., Obtaining appropriate analgesic effect of treatment.  Burak Gillis DO)      Future Appointments   Date Time Provider Sara [place]   1/28/2020  1:20 PM DO Digna Cortezber[facility] - [place]   2/27/2020 11:15 AM Hosea Lozano MD Oncology [facility] - [place]

## 2020-02-02 NOTE — PROGRESS NOTES
Pain in L shoulder getting better. MS contin working well at 60mg bid and we dec his ox to 10mg q6h prn from q4h prn 2 wks ago and has done fine with this. Due for refill of ox. Has 1 wk of morphine left. Overall stable if a bit better. No bowel issues.      UPDATE PRIOR VISIT: doing well with weaning down on pain meds. On 60mg bid of MS contin and oxy IR 10mg q6h prn. Willing to drop to 5mg q6h prn. Feels really good pain wise.       UPDATE PRIOR VISIT: pain stable. MS contin working fine at present dose. 30mg not nearly as effective. Doing ok with dropping oxy IR to 5m q6h prn from 10. In more pain than prior, but is tolerable. Doesn't want to adjust further at this time.       UPDATE PRIOR VISIT: MS contin at 60mg bid con't to work well. We had worked him down to oxy IR 5mg q6h, but this isn't holding him. Asking if can go do q4h prn. Feels this will get him to where he needs for his pain. We discussed his MEQ level today.       UPDATE PRIOR VISIT: pain stable on current regimen. We'd changed his oxy IR to 5mg q4h prn from q6h prn last visit. MS contin the same. Working well.       UPDATE PRIOR VISIT: regimen unchanged, oxy IR 5mg q6h prn and MS contin 60mg bid. Tolerating well. Pain controlled. Pain not controlled on lower MEQ at this time.      UPDATE PRIOR VISIT: mistake in last visit for this. He is on oxy IR 5mg q4h prn and MS contin 60mg bid. Have weaned him down quite a bit, but is still on about 165 MEQ . Have tried to get him lower, but have been unable to so far. Has been stable on this regimen for a few months now. Pain in L arm and R shoulder better. Never had surgery for R rotator cuff tear. States doing better, so plans to monitor.      UPDATE PRIOr VISIT: pain stable, on stable regimen for a few months. MEQ still at 165. Tried to get lower, but did not tolerate. Shoulder pain better. UPDATE PRIOR VISIT: pain is stable. On stble regimen. MEQ is still at 165.  We discussed decreasing, he would like to continue for now as pain is controlled. No side effects. Long discussion on this, after this he's ok, at next fill of oxycodone, he's ok to change from q4h prn to q6h prn. Just filled q4h dosing yesterday. UPDATE PRIOR VISIT: PAIN STABLE. On stable regimen. MEQ at 165, he is willing to drop his oxy IR to q6h from q4h now. Pain contract updated. MS contin dose is stable     UPDATE PRIOR VISIT: MS contin dec to 30mg bid and oxy 5mg was initially at q6h prn with 60mg of MS contin, but pt asked to change oxy to q4h prn and reduce MS Contin dose. MEQ down to 105. Doing well on adjustment. Needs controlled substance agreement updated. UPDATE PRIOR VISIT: on stable pain regimen. MEQ at 105 yet. We've weaned this down quite a bit. UTD on contrlled sub agreement. Has nalaxone, has not needed. Pain controlled. Needs RF of MS contin    UPDATE LAST VISIT:   On stable regimen yet  MEQ at 105  We've weaned this down quite a bit. UTD on contrlled sub agreement. Has nalaxone, has not needed. Pain controlled. UPDATE TODAY:   Pain stable  MEQ at 105 still  We've weaned this down quite a bit. UTD on contrlled sub agreement. Has nalaxone, has not needed. Pain controlled. -COPD PRIOR VISIT: breathing close baseline. Taking Symbicort and prn alb. Typically uses alb once to twice per day. Worse the last wk d/t inc cough and mild wheezing. No SOB he states. No fevers.      UPDATE PRIOR VISIT: breathing stable. Is having inc cough and mucous production the last 2 wks. Still smoking. Using alb twice per wk yet. No SOB. Asking for ATB.        UPDATE PRIOR VISIT: breathing stable. Using meds as rx'd. No sob or wheeze. Down to 1 PPD from 1.5 PPD.        UPDATE TODAY: breathing stable. Using meds as rx'd no SOB or wheezing. Down to 1/2 PPD smoking now.,        -HCV PRIOR VISIT: never did f/u with GI, Lauren Cano, for treatment.  Has apt about colo upcoming (he had to reschedule this from last visit), plans to f/u Working on quitting rest of the way. Declines medical intervention      UPDATE PRIOR VISIT: on 1/2 PPD yet. Working on quitting rest of the way. Declines interevention     UPDATE TODAY: down to 6 cig per day yet. Working on quitting the rest of the way, declines interventions       Age/Gender Health Maintenance    Lipid - ;  LDL 71; HDL 65; TG 83 (JAN 2017)    Lab Results   Component Value Date    CHOL 142 09/05/2019    CHOL 159 03/21/2018    CHOL 108 01/30/2017     Lab Results   Component Value Date    TRIG 46 09/05/2019    TRIG 65 03/21/2018    TRIG 101 01/30/2017     Lab Results   Component Value Date    HDL 64 09/05/2019    HDL 69 03/21/2018    HDL 40 01/30/2017     Lab Results   Component Value Date    LDLCALC 69 09/05/2019    LDLCALC 77 03/21/2018    LDLCALC 48 01/30/2017         DM Screen - 96 (JAN 2016)    Lab Results   Component Value Date    GLUCOSE 103 09/05/2019    GLUCOSE 134 12/12/2011       Lung CA: NEG CT MAY 2019  Colon Cancer Screening - NEG 3/13, repeat 5 years d/t questional fam hx (has upcoming apt with GI per pt APR 2018) had to reschedule July 2018/OCT 2018 still plans to call and schedule/JAN 2019, states plans to call and scahedule soon./same story APR 2019/plans to call tomorrow to schedule July 2019/OCT 2019/JAN 2020    Tetanus - UTD 2007/to get at pharmacy per medicare rules  Influenza Vaccine - Declines DEC 2017/OCT 2018/OCT 2019  Pneumonia Vaccine - UTD DEC 2014 PPV 23/wants to wait until next visit (Josephus Light 2016)/MARCH 2016 as well/and July 2016/AND NOV 2016/MARCH 2017/declines July 2018 (States will get with flu FALL 2018)/OCT 2018/JAN 2019/declines APR 2019/OCT 2019/JAN 2020)    Zostavax - UTD FALL 2014   Shingrix - to get at pharmacy per insurance    PSA testing discussion - 0.3 MAY 2016/0.3 (APR 2018)  Lab Results   Component Value Date    PSA 0.38 09/05/2019       AAA Screening - age 70  DEXA - Osteopenia MAY 2019    Specialist List    Oncology: Iesha Umanzor  GI: Arcelia Song  [facility]o: [facility]  ID: [id number]       Current Outpatient Medications   Medication Sig Dispense Refill    oxyCODONE (ROXICODONE) 5 MG immediate release tablet Take 1 tablet by mouth every 4 hours as needed for Pain for up to 30 days. 180 tablet 0    butalbital-acetaminophen-caffeine (FIORICET, ESGIC) -40 MG per tablet TAKE 1 TABLET BY MOUTH EVERY 6 HOURS AS NEEDED FOR HEADACHE 60 tablet 0    morphine (MS CONTIN) 30 MG extended release tablet Take 1 tablet by mouth 2 times daily for 30 days.  60 tablet 0    omeprazole (PRILOSEC) 20 MG delayed release capsule TAKE 1 CAPSULE BY MOUTH DAILY 90 capsule 3    naproxen (NAPROSYN) 500 MG tablet TAKE 1 TABLET BY MOUTH TWICE DAILY AS NEEDED FOR PAIN 60 tablet 2    ondansetron (ZOFRAN) 4 MG tablet TAKE 1 TABLET BY MOUTH EVERY DAY AS NEEDED FOR NAUSEA AND VOMITING 60 tablet 0    lidocaine (XYLOCAINE) 2 % jelly Apply topically 3 times daily as needed for pain 150 g 5    SYMBICORT 160-4.5 MCG/ACT AERO INHALE 2 PUFFS INTO LUNGS TWICE DAILY 3 Inhaler 3    fluticasone (FLONASE) 50 MCG/ACT nasal spray USE 1 SPRAY IN EACH NOSTRIL ONCE DAILY 3 Bottle 3    cromolyn (OPTICROM) 4 % ophthalmic solution Place 1 drop into both eyes 4 times daily 3 Bottle 3    loratadine (CLARITIN) 10 MG tablet Take 1 tablet by mouth daily 30 tablet 3    albuterol sulfate HFA (VENTOLIN HFA) 108 (90 Base) MCG/ACT inhaler INHALE 2 PUFFS INTO THE LUNGS EVERY 6 HOURS AS NEEDED FOR WHEEZING 2 Inhaler 5    albuterol (PROVENTIL) (2.5 MG/3ML) 0.083% nebulizer solution USE 1 VIAL VIA NEBULIZER EVERY 4 HOURS AS NEEDED FOR WHEEZING OR SHORTNESS OF BREATH 360 mL 3    naloxone 4 MG/0.1ML LIQD nasal spray 1 spray by Nasal route as needed for Opioid Reversal 1 each 2    Respiratory Therapy Supplies (NEBULIZER COMPRESSOR) KIT 1 kit by Does not apply route once for 1 dose This is for the nebulizer MACHINE 1 kit 0    Respiratory Therapy Supplies (NEBULIZER AIR TUBE/PLUGS) MISC Use as directed 1 each 0    polyethylene glycol (GLYCOLAX) powder MIX 17 GRAMS IN 8 OZ OF LIQUID, AND DRINK EVERY DAY AS NEEDED FOR CONSTIPATION 527 g 0    Handicap Placard MISC by Does not apply route Expires 06 June 2023 1 each 0    Compression Stockings MISC by Does not apply route Bilateral compression stockings. 20-30mmHg (Patient not taking: Reported on 12/20/2019) 2 each 0    furosemide (LASIX) 20 MG tablet Take 2 tabs PO daily x 5 days, then, TAKE 1 TABLET BY MOUTH EVERY DAY AS NEEDED FOR LEG SWELLING 90 tablet 3    busPIRone (BUSPAR) 15 MG tablet TAKE 1 TABLET BY MOUTH EVERY 6 HOURS 360 tablet 3    Nutritional Supplements (ENSURE) LIQD Low calorie Ensure (200 ronald). Drink 2 cans per day 60 Can 5    Respiratory Therapy Supplies (NEBULIZER/TUBING/MOUTHPIECE) KIT COPD 1 kit 0    tiZANidine (ZANAFLEX) 4 MG tablet Take 1 tablet by mouth every 8 hours as needed (back pain) 90 tablet 1    guaiFENesin (MUCINEX) 600 MG extended release tablet Take 1 tablet by mouth 2 times daily as needed for Congestion 60 tablet 1    metoprolol tartrate (LOPRESSOR) 25 MG tablet TAKE 1 TABLET BY MOUTH TWICE DAILY 180 tablet 3    atorvastatin (LIPITOR) 10 MG tablet TAKE 1 TABLET BY MOUTH ONCE DAILY 90 tablet 3    calcium-cholecalciferol 500-200 MG-UNIT per tablet Take 1 tablet by mouth 2 times daily 180 tablet 3    aspirin 81 MG EC tablet Take 1 tablet by mouth daily 30 tablet 3    Misc. Devices MISC TENS UNIT ELECTRODE PADS    Use daily as needed for back pain. 4 each 3    Misc. Devices (QUAD CANE) MISC Adjustable narrow base quad cane 1 each 0    triamcinolone (KENALOG) 0.1 % cream Apply topically 2 times daily for up to 4 weeks, then weekends only as needed. 60 g 0    Multiple Vitamin (MULTIVITAMINS PO) Take 1 tablet by mouth daily. No current facility-administered medications for this visit.       Facility-Administered Medications Ordered in Other Visits   Medication Dose Route Frequency Provider Last Rate Last Dose    Absorbable Collagen Hemostat MISC 0.5 g  0.5 g Intra-Lesional Once Cosmo Cooper MD         No orders of the defined types were placed in this encounter. All medications reviewed and reconciled, including OTC and herbal medications. Updated list given to patient. Patient Active Problem List    Diagnosis Date Noted    Chronic right shoulder pain 08/27/2019    Ascending aortic aneurysm ([facility]     Controlled substance agreement signed 03/19/2019    Acute pain of right shoulder 03/19/2019    Recurrent umbilical hernia 81/73/4211    Dyslipidemia     S/P shoulder surgery 12/07/2017    Biceps tendon rupture, left, sequela 11/02/2017    Tear of right rotator cuff 09/25/2017    Chronic venous insufficiency     Allergic conjunctivitis 06/20/2017    Microscopic hematuria 05/31/2017     Following with urology now      Rupture of distal biceps tendon 05/08/2017    Osteopenia     History of lymphoma     Chronic pain of right knee 01/20/2017    Ventral hernia without obstruction or gangrene     Pulmonary nodules 09/15/2015     Being monitored by oncology      Cervical radiculopathy 09/15/2015    CLIFF (generalized anxiety disorder) 08/07/2015    Failed back syndrome 06/08/2015    Allergic rhinitis 06/08/2015    Chronic pain syndrome 04/05/2015    Insomnia 02/20/2015    History of chemotherapy 02/09/2015    Chronic anemia 01/28/2015    Diffuse large B cell lymphoma ([facility] [address] follows with Dr Mariaelena Hayes 12/12/2014    Chronic combined systolic and diastolic heart failure ([facility] 12/12/2014     EF 45-50% FEB 2015/JUNE 2016  However, AUG 2017:  Summary   Left Ventricular size is Normal .   Normal left ventricular wall thickness.   Ejection fraction is visually estimated 55 %.  Mildly enlarged right atrium size.   Mildly enlarged right ventricle cavity.   Mild tricuspid regurgitation visualized.       DDD (degenerative disc disease), thoracolumbar 12/01/2014    Arthritis     Sleep apnea      no CPAP      GERD (gastroesophageal reflux disease)     Essential hypertension 10/20/2014    Nicotine dependence 07/16/2014    History of spinal stenosis 07/16/2014    Bilateral inguinal hernia (BIH) 09/26/2013    COPD (chronic obstructive pulmonary disease) ([facility] 09/26/2013    [facility] 09/26/2013       Past Medical History:   Diagnosis Date    Allergic conjunctivitis 6/20/2017    Allergic rhinitis 6/8/2015    Arthritis     Ascending aortic aneurysm (HCC)     Cervical radiculopathy 9/15/2015    Chronic combined systolic and diastolic heart failure ([facility] 12/12/2014    EF 45-50% FEB 20152015/JUNE 2016    Chronic hepatitis C without hepatic coma (HCC) 9/26/2013    Chronic pain syndrome 4/5/2015    Chronic venous insufficiency     COPD (chronic obstructive pulmonary disease) (HCC)     DDD (degenerative disc disease), thoracolumbar 12/1/2014    Diffuse large B cell lymphoma ([facility] follows with Dr Lona Peterson 12/12/2014    Dyslipidemia     Essential hypertension     Failed back syndrome 6/8/2015    CLIFF (generalized anxiety disorder) 8/7/2015    GERD (gastroesophageal reflux disease)     History of lymphoma     Insomnia 2/20/2015    Microscopic hematuria 5/31/2017    Nicotine dependence 7/16/2014    Osteopenia     Personal history of colonic polyps 10/24/2014    Pulmonary nodules 9/15/2015    Being monitored by oncology     Sleep apnea     no CPAP       Past Surgical History:   Procedure Laterality Date    APPENDECTOMY  age 13    Cris Gin BACK SURGERY  03/31/2014    Lumbar Laminectomy L3-5 x3    BICEPS TENDON REPAIR Left 11/01/2017    Dr. Evi Selby Left 2018    CARDIOVASCULAR STRESS TEST  07/06/2005    CARDIOVASCULAR STRESS TEST  06/20/2005    CARDIOVASCULAR STRESS TEST  05/08/2017    COLONOSCOPY  2013    Dr. Verónica Whelan     ERCP  10/23/2014    Sidra Hinton Right     Dr Dionna Hernandes    OTHER SURGICAL HISTORY  OCT 2014    Billiary Stent    OTHER SURGICAL HISTORY  10/23/2014    US guided paracentesis-[facility]    OTHER SURGICAL HISTORY  07/01/2013    US guided liver biopsy-[facility]    TRANSTHORACIC ECHOCARDIOGRAM      6/95/80,8/83/31,6/17/90,69/5/91    UMBILICAL HERNIA REPAIR  age 13    Garg VENTRAL HERNIA REPAIR  10/05/2015    w mesh-Dr Quezada Bending       Allergies   Allergen Reactions    Chantix [Varenicline]      Makes him feel terrible    Keflex [Cephalexin] Itching    Neurontin [Gabapentin] Other (See Comments)     HALLUCINATIONS    Tramadol Nausea Only and Nausea And Vomiting       Social History     Tobacco Use    Smoking status: Current Every Day Smoker     Packs/day: 1.00     Years: 40.00     Pack years: 40.00     Types: Cigarettes    Smokeless tobacco: Never Used    Tobacco comment: smoking cessation information given at past appt   Substance Use Topics    Alcohol use: No     Alcohol/week: 0.0 standard drinks       Family History   Problem Relation Age of Onset    Heart Disease Mother     Diabetes Mother     High Blood Pressure Mother     High Cholesterol Mother     High Blood Pressure Father     High Cholesterol Father     Cancer Paternal Grandfather         colon    Diabetes Maternal Grandmother          I have reviewed the patient's past medical history, past surgical history, allergies, medications, social and family history and I have made updates where appropriate.       Review of Systems  Positive responses are highlighted in bold    Constitutional:  Fever, Chills, Night Sweats, Fatigue, Unexpected changes in weight  HENT:  Ear pain, Tinnitus, Nosebleeds, Trouble swallowing, Hearing loss  Cardiovascular:  Chest Pain, Palpitations, Orthopnea, Paroxysmal Nocturnal Dyspnea  Respiratory:  Cough, Wheezing, Shortness of breath, Chest tightness, Apnea  Gastrointestinal:  Nausea, Vomiting, Diarrhea, Constipation, Heartburn, Blood in stool  Genitourinary:  Difficulty or painful urination, Flank pain, Change in frequency, Urgency  Skin:

## 2020-02-06 NOTE — TELEPHONE ENCOUNTER
Diagnosis Orders   1.  Chronic obstructive pulmonary disease, unspecified COPD type ([facility]  fluticasone-vilanterol (BREO ELLIPTA) 100-25 MCG/INH AEPB inhaler     Medications Discontinued During This Encounter   Medication Reason    SYMBICORT 160-4.5 MCG/ACT AERO Formulary change     Future Appointments   Date Time Provider Sara Suzy   2/27/2020 11:15 AM Alexandra Draper MD Oncology P - [id number]   5/4/2020 12:40 PM Marissa Nova, [address] [place] [address]

## 2020-02-06 NOTE — TELEPHONE ENCOUNTER
Left detailed msg on mach with formulary alternative that was sent to [employer].   (ok per signed HIPAA)

## 2020-02-10 NOTE — TELEPHONE ENCOUNTER
Tell him to give it 4 wks  Call if sig worsens in the mean time  Otherwise, update us in 4 wks  Thanks!

## 2020-02-12 NOTE — CODE DOCUMENTATION
Time of death called at this time by Dr. [employer]. Family is at bedside . Dr. [employer] comforting family at this time.

## 2020-02-12 NOTE — CODE DOCUMENTATION
Pt to ED by [employer]. Pt presents to ED as a code. According to EMS and spouse last time pt was seen was an hour prior. Spouse states that pt only symptoms the past few days were flu like symptoms including nausea and vomiting. EMS gave 2 rounds of Epi, 1 sodium bicarbonate ampule and an ampule of Dextrose 50% due to glucose on scene of 50. On ems arrival pt was covered in emesis and was asystole on arrival. On arrival to ED compressions are ongoing by EMS. Pt is covered in emesis and has an ETT tube. Pt has an IO established in his left tibia with fluids running by a pressure bag. Pt is dusky in color and hands are blue. Pt eyes pin point and nonreactive to light. Pt transferred to ED bed and compressions continues.  Dr. Pabon Dust and respiratory at bedside on EMS arrival.

## 2020-02-12 NOTE — ED PROVIDER NOTES
600 MG extended release tablet Take 1 tablet by mouth 2 times daily as needed for Congestion, Disp-60 tablet, R-1Normal      metoprolol tartrate (LOPRESSOR) 25 MG tablet TAKE 1 TABLET BY MOUTH TWICE DAILY, Disp-180 tablet, R-3**Patient requests 90 days supply**Normal      atorvastatin (LIPITOR) 10 MG tablet TAKE 1 TABLET BY MOUTH ONCE DAILY, Disp-90 tablet, R-3**Patient requests 90 days supply**Normal      calcium-cholecalciferol 500-200 MG-UNIT per tablet Take 1 tablet by mouth 2 times daily, Disp-180 tablet, R-3Normal      aspirin 81 MG EC tablet Take 1 tablet by mouth daily, Disp-30 tablet, R-3      !! Misc. Devices MISC Disp-4 each, R-3, PrintTENS UNIT ELECTRODE PADS  Use daily as needed for back pain. !! Misc. Devices (QUAD CANE) MISC Disp-1 each, R-0, PrintAdjustable narrow base quad cane      triamcinolone (KENALOG) 0.1 % cream Apply topically 2 times daily for up to 4 weeks, then weekends only as needed. , Disp-60 g, R-0, Normal      Multiple Vitamin (MULTIVITAMINS PO) Take 1 tablet by mouth daily. !! - Potential duplicate medications found. Please discuss with provider. ALLERGIES     is allergic to chantix [varenicline]; keflex [cephalexin]; neurontin [gabapentin]; and tramadol. FAMILY HISTORY     He indicated that his mother is alive. He indicated that his father is alive. He indicated that the status of his maternal grandmother is unknown. He indicated that his paternal grandfather is . family history includes Cancer in his paternal grandfather; Diabetes in his maternal grandmother and mother; Heart Disease in his mother; High Blood Pressure in his father and mother; High Cholesterol in his father and mother. SOCIAL HISTORY    reports that he has been smoking cigarettes. He has a 40.00 pack-year smoking history. He has never used smokeless tobacco. He reports that he does not drink alcohol or use drugs.     PHYSICAL EXAM     INITIAL VITALS:  oxygen saturation is 68% (abnormal). Physical Exam  Vitals signs and nursing note reviewed. Constitutional:       General: He is in acute distress. Appearance: He is toxic-appearing. Interventions: He is intubated. Backboard in place. HENT:      Head: Normocephalic and atraumatic. Comments: Patient's face and neck are blue in color     Right Ear: External ear normal.      Left Ear: External ear normal.      Nose: Nose normal.      Mouth/Throat:      Comments: Large amounts of secretions coming from the patient's mouth. Eyes:      General:         Right eye: No discharge. Left eye: No discharge. Neck:      Musculoskeletal: Normal range of motion and neck supple. Normal range of motion. Thyroid: No thyromegaly. Vascular: No JVD. Trachea: No tracheal deviation. Cardiovascular:      Comments: No cardiac movement  Pulmonary:      Effort: Respiratory distress present. He is intubated. Chest:      Comments: Chest wall is blue in color from the nipple line upward. Chest is tight. Abdominal:      General: There is distension. Comments: Abdomen is distended and tight. Musculoskeletal: Normal range of motion. General: No tenderness. Skin:     General: Skin is cool. Comments: Notable pooling to the back and buttock. Neurological:      Mental Status: He is unresponsive. DIFFERENTIAL DIAGNOSIS:   Differential diagnoses were discussed extensively with the patient and family including but no limited to AMI, stroke, intercranial hemorrhage, TIA.      DIAGNOSTIC RESULTS     EKG: All EKG's are interpreted by the Emergency Department Physician who either signs or Co-signs this chart in the absence of a cardiologist.  EKG interpreted by No att. providers found:    none        RADIOLOGY: non-plain film images(s) such as CT, Ultrasound and MRI are read by the radiologist.    No orders to display       LABS:   Labs Reviewed   POCT GLUCOSE - Abnormal; Notable for the following components:       Result Value    POC Glucose 27 (*)     All other components within normal limits       EMERGENCY DEPARTMENT COURSE:   Vitals:    Vitals:    20 0008   SpO2: (!) 68%     23:49 PM: The patient was seen and evaluated. First round of epi given in the department given at this time. This is the patient 3 round or epi. No pulse felt. Manual compression started by nursing staff. Gross amounts of secretions noted from the patient. 23:51 PM: ETT tube placed by EMS is removed at this time due to improper placement. Respiratory present at this time. Patient is bag mask ventilated. 23:53 PM: Pulse check, no pulse felt. Fourth round of epi given. 23:56 PM: Pulse check, no pulse felt. Fifth epi given. 23:57 PM: second bicarb given. 23:59 PM: Sixth epi given, patient is asystolic on monitor, no cardiac movement see on ultra sound. 00:02 AM: Seventh Epi given, patient is asystolic on monitor, no cardiac movement seen on ultra sound. 00:07 AM: Eight epi and third bicarb given    00:09 AM:  Ninth epi given, patient is asystolic on monitor, no cardiac movement seen on ultra sound. 00:13 AM: Tenth and final epi given, patient is asystolic on monitor, no cardiac movement seen on ultra sound. 00:16 AM: Patient is asystolic on monitor, no cardiac movement seen on ultra sound. Compressions stopped at this time. time of death called at this time. 00:25 AM: Dr. Paulo Tan (family medicine), the patient's PCP, notified of the patient's passing and will sign the death certificate. CRITICALCARE:   30 minutes     CONSULTS:  none    PROCEDURES:  None    FINAL IMPRESSION      1. Cardiopulmonary arrest [facility])          DISPOSITION/PLAN       PATIENT REFERRED TO:  No follow-up provider specified.     DISCHARGE MEDICATIONS:  Discharge Medication List as of 2020  1:43 AM          (Please note that portions of this note were completed with a voice recognition program.

## 2020-02-12 NOTE — CODE DOCUMENTATION
Dr Ashlee Hogan using ultrasound at this time to check for cardiac activity. Pt asystole on the cardiac monitor. Pt pulses absent. No cardiac activity is present on ultrasound. Pt is dusky and blue in color.
